# Patient Record
Sex: FEMALE | Race: BLACK OR AFRICAN AMERICAN | NOT HISPANIC OR LATINO | Employment: FULL TIME | URBAN - METROPOLITAN AREA
[De-identification: names, ages, dates, MRNs, and addresses within clinical notes are randomized per-mention and may not be internally consistent; named-entity substitution may affect disease eponyms.]

---

## 2017-03-29 ENCOUNTER — LAB VISIT (OUTPATIENT)
Dept: LAB | Facility: HOSPITAL | Age: 32
End: 2017-03-29
Attending: REGISTERED NURSE
Payer: COMMERCIAL

## 2017-03-29 ENCOUNTER — OFFICE VISIT (OUTPATIENT)
Dept: FAMILY MEDICINE | Facility: CLINIC | Age: 32
End: 2017-03-29
Payer: COMMERCIAL

## 2017-03-29 VITALS
HEIGHT: 66 IN | HEART RATE: 76 BPM | BODY MASS INDEX: 47.09 KG/M2 | WEIGHT: 293 LBS | DIASTOLIC BLOOD PRESSURE: 72 MMHG | TEMPERATURE: 99 F | RESPIRATION RATE: 18 BRPM | OXYGEN SATURATION: 99 % | SYSTOLIC BLOOD PRESSURE: 128 MMHG

## 2017-03-29 DIAGNOSIS — E88.819 INSULIN RESISTANCE: ICD-10-CM

## 2017-03-29 DIAGNOSIS — E55.9 VITAMIN D DEFICIENCY DISEASE: ICD-10-CM

## 2017-03-29 DIAGNOSIS — E66.01 MORBID OBESITY WITH BMI OF 45.0-49.9, ADULT: ICD-10-CM

## 2017-03-29 DIAGNOSIS — J30.9 ALLERGIC RHINITIS, CAUSE UNSPECIFIED: ICD-10-CM

## 2017-03-29 DIAGNOSIS — Z00.00 ANNUAL PHYSICAL EXAM: Primary | ICD-10-CM

## 2017-03-29 DIAGNOSIS — Z00.00 ANNUAL PHYSICAL EXAM: ICD-10-CM

## 2017-03-29 LAB
BASOPHILS # BLD AUTO: 0.03 K/UL
BASOPHILS NFR BLD: 0.5 %
DIFFERENTIAL METHOD: ABNORMAL
EOSINOPHIL # BLD AUTO: 0.1 K/UL
EOSINOPHIL NFR BLD: 1.6 %
ERYTHROCYTE [DISTWIDTH] IN BLOOD BY AUTOMATED COUNT: 15.6 %
HCT VFR BLD AUTO: 38.1 %
HGB BLD-MCNC: 12.3 G/DL
LYMPHOCYTES # BLD AUTO: 2.2 K/UL
LYMPHOCYTES NFR BLD: 34.7 %
MCH RBC QN AUTO: 26.2 PG
MCHC RBC AUTO-ENTMCNC: 32.3 %
MCV RBC AUTO: 81 FL
MONOCYTES # BLD AUTO: 0.6 K/UL
MONOCYTES NFR BLD: 8.7 %
NEUTROPHILS # BLD AUTO: 3.4 K/UL
NEUTROPHILS NFR BLD: 54.3 %
PLATELET # BLD AUTO: 256 K/UL
PMV BLD AUTO: 11.4 FL
RBC # BLD AUTO: 4.7 M/UL
WBC # BLD AUTO: 6.29 K/UL

## 2017-03-29 PROCEDURE — 80053 COMPREHEN METABOLIC PANEL: CPT

## 2017-03-29 PROCEDURE — 36415 COLL VENOUS BLD VENIPUNCTURE: CPT | Mod: PO

## 2017-03-29 PROCEDURE — 83036 HEMOGLOBIN GLYCOSYLATED A1C: CPT

## 2017-03-29 PROCEDURE — 82306 VITAMIN D 25 HYDROXY: CPT

## 2017-03-29 PROCEDURE — 85025 COMPLETE CBC W/AUTO DIFF WBC: CPT

## 2017-03-29 PROCEDURE — 99999 PR PBB SHADOW E&M-EST. PATIENT-LVL III: CPT | Mod: PBBFAC,,, | Performed by: REGISTERED NURSE

## 2017-03-29 PROCEDURE — 80061 LIPID PANEL: CPT

## 2017-03-29 PROCEDURE — 83525 ASSAY OF INSULIN: CPT

## 2017-03-29 PROCEDURE — 99395 PREV VISIT EST AGE 18-39: CPT | Mod: S$GLB,,, | Performed by: REGISTERED NURSE

## 2017-03-29 PROCEDURE — 84443 ASSAY THYROID STIM HORMONE: CPT

## 2017-03-29 NOTE — PROGRESS NOTES
"Subjective:       Patient ID: Marco Antonio Cisse is a 31 y.o. female.    Chief Complaint: Annual Exam    HPI     Marco Antonio is here today for her annual wellness exam.  I have reviewed the patient's medical history in detail and updated the computerized patient record.    Diet --- Reports increased chicken, red meat, salad, fruit and pasta.  Has cut back on soda and rice.  Drinks 2 bottles of water daily.  Eats 3 meals daily.  Exercise --- Walks 3 times per week x 30 min.  Weight --- Stable.    Gyn --- followed by Dr. Lau, wellness exam completed yesterday with Pap smear.    Vitamin-D def, on no supplementation currently.      Review of Systems   Constitutional: Positive for activity change (exercising more). Negative for appetite change, chills, diaphoresis, fatigue, fever and unexpected weight change.   HENT: Negative.    Eyes: Negative.    Respiratory: Negative.    Cardiovascular: Negative for chest pain, palpitations and leg swelling.   Gastrointestinal: Negative.    Endocrine: Negative for cold intolerance, heat intolerance, polydipsia, polyphagia and polyuria.   Genitourinary: Negative.    Musculoskeletal: Negative.    Skin: Positive for wound (insect bite corner of RT eye).   Neurological: Negative.    Hematological: Negative for adenopathy. Does not bruise/bleed easily.   Psychiatric/Behavioral: Negative.        Objective:         Vitals:    03/29/17 1132   BP: 128/72   BP Location: Left arm   Patient Position: Sitting   BP Method: Manual   Pulse: 76   Resp: 18   Temp: 98.8 °F (37.1 °C)   TempSrc: Tympanic   SpO2: 99%   Weight: 133.6 kg (294 lb 8.6 oz)   Height: 5' 5.5" (1.664 m)       Physical Exam   Constitutional: She is oriented to person, place, and time. She appears well-developed and well-nourished. No distress.   HENT:   Head: Normocephalic and atraumatic.   Right Ear: External ear normal.   Left Ear: External ear normal.   Nose: Nose normal.   Mouth/Throat: Oropharynx is clear and " moist.   Eyes: Conjunctivae and EOM are normal. Pupils are equal, round, and reactive to light. Right eye exhibits no discharge. Left eye exhibits no discharge. No scleral icterus.       Insect bite noted, non-infected.   Neck: Normal range of motion. Neck supple. No JVD present. No tracheal deviation present. No thyromegaly present.   Cardiovascular: Normal rate, regular rhythm, normal heart sounds and intact distal pulses.  Exam reveals no gallop and no friction rub.    No murmur heard.  Pulmonary/Chest: Effort normal and breath sounds normal. No stridor. No respiratory distress. She has no wheezes. She exhibits no tenderness.   Abdominal: Soft. Bowel sounds are normal.   Musculoskeletal: Normal range of motion. She exhibits no edema, tenderness or deformity.   Lymphadenopathy:     She has no cervical adenopathy.   Neurological: She is alert and oriented to person, place, and time. She exhibits normal muscle tone. Coordination normal.   Skin: Skin is warm and dry. She is not diaphoretic.   Psychiatric: She has a normal mood and affect. Her behavior is normal. Judgment and thought content normal.   Vitals reviewed.      Assessment:       1. Annual physical exam    2. Insulin resistance    3. Allergic rhinitis, cause unspecified    4. Vitamin D deficiency disease    5. Morbid obesity with BMI of 45.0-49.9, adult        Plan:       Marco Antonio was seen today for annual exam.    Diagnoses and all orders for this visit:    Annual physical exam  -     CBC auto differential; Future  -     Comprehensive metabolic panel; Future  -     TSH; Future  -     Vitamin D; Future  -     Lipid panel; Future  -     Hemoglobin A1c; Future  -     Insulin, random; Future    Insulin resistance  -     Hemoglobin A1c; Future  -     Insulin, random; Future    Allergic rhinitis, cause unspecified        -    Stable, on medication as needed.    Vitamin D deficiency disease  -     Vitamin D; Future    Morbid obesity with BMI of 45.0-49.9,  adult  -     CBC auto differential; Future  -     Comprehensive metabolic panel; Future  -     TSH; Future  -     Lipid panel; Future      Healthy diet, routine exercise, weight loss.  Lab pending.  Follow-up in clinic as needed.

## 2017-03-29 NOTE — MR AVS SNAPSHOT
[facility]  [address]  [place] [address]  Phone: [phone]                  Marco Antonio Cisse   3/29/2017 11:30 AM   Office Visit    Description:  Female : 1985   Provider:  Randal Jain NP   Department:  [facility]           Reason for Visit     Annual Exam           Diagnoses this Visit        Comments    Annual physical exam    -  Primary     Insulin resistance         Allergic rhinitis, cause unspecified         Vitamin D deficiency disease         Morbid obesity with BMI of 45.0-49.9, adult                To Do List           Future Appointments        Provider Department Dept Phone    3/29/2017 12:10 PM LABORATORY, [address] [facility] [phone]      Goals (5 Years of Data)     None      [facility]     [facility] Care [facility] -  Assistance  Registered nurses in the [facility] provide clinical advisement, health education, appointment booking, and other advisory services.  Call for this free service at [phone].             Medications           Message regarding Medications     Verify the changes and/or additions to your medication regime listed below are the same as discussed with your clinician today.  If any of these changes or additions are incorrect, please notify your healthcare provider.        STOP taking these medications     acrivastine-pseudoephedrine 8-60 mg Cap Take 1 tablet by mouth every 4 to 6 hours as needed.    fluticasone (FLONASE) 50 mcg/actuation nasal spray 2 sprays by Each Nare route once daily.    nystatin (MYCOSTATIN) cream Apply topically 2 (two) times daily.           Verify that the below list of medications is an accurate representation of the medications you are currently taking.  If none reported, the list may be blank. If incorrect, please contact your healthcare provider. Carry this list with you in case of emergency.          "  Current Medications     albuterol 90 mcg/actuation inhaler Inhale 2 puffs into the lungs every 4 to 6 hours as needed for Wheezing.    azelastine (ASTELIN) 137 mcg nasal spray 1 spray (137 mcg total) by Nasal route 2 (two) times daily.    diclofenac potassium 50 mg PwPk Take 50 mg by mouth once as needed.           Clinical Reference Information           Your Vitals Were     BP Pulse Temp Resp    128/72 (BP Location: Left arm, Patient Position: Sitting, BP Method: Manual) 76 98.8 °F (37.1 °C) (Tympanic) 18    Height Weight SpO2 BMI    5' 5.5" (1.664 m) 133.6 kg (294 lb 8.6 oz) 99% 48.27 kg/m2      Blood Pressure          Most Recent Value    BP  128/72      Allergies as of 3/29/2017     Lortab [Hydrocodone-acetaminophen]      Immunizations Administered on Date of Encounter - 3/29/2017     None      Orders Placed During Today's Visit     Future Labs/Procedures Expected by Expires    CBC auto differential  3/29/2017 5/28/2018    Comprehensive metabolic panel  3/29/2017 5/28/2018    Hemoglobin A1c  3/29/2017 5/28/2018    Insulin, random  3/29/2017 5/28/2018    Lipid panel  3/29/2017 5/28/2018    TSH  3/29/2017 5/28/2018    Vitamin D  3/29/2017 5/28/2018      Language Assistance Services     ATTENTION: Language assistance services are available, free of charge. Please call [phone].      ATENCIÓN: Si habla español, tiene a barnard disposición servicios gratuitos de asistencia lingüística. Llame al [phone].     CHÚ Ý: N?u b?n nói Ti?ng Vi?t, có các d?ch v? h? tr? ngôn ng? mi?n phí dành cho b?n. G?i s? [phone].         [facility] complies with applicable Federal civil rights laws and does not discriminate on the basis of race, color, national origin, age, disability, or sex.        "

## 2017-03-30 LAB
25(OH)D3+25(OH)D2 SERPL-MCNC: 16 NG/ML
ALBUMIN SERPL BCP-MCNC: 3.5 G/DL
ALP SERPL-CCNC: 65 U/L
ALT SERPL W/O P-5'-P-CCNC: 15 U/L
ANION GAP SERPL CALC-SCNC: 10 MMOL/L
AST SERPL-CCNC: 18 U/L
BILIRUB SERPL-MCNC: 0.5 MG/DL
BUN SERPL-MCNC: 8 MG/DL
CALCIUM SERPL-MCNC: 9 MG/DL
CHLORIDE SERPL-SCNC: 105 MMOL/L
CHOLEST/HDLC SERPL: 3.6 {RATIO}
CO2 SERPL-SCNC: 22 MMOL/L
CREAT SERPL-MCNC: 0.7 MG/DL
EST. GFR  (AFRICAN AMERICAN): >60 ML/MIN/1.73 M^2
EST. GFR  (NON AFRICAN AMERICAN): >60 ML/MIN/1.73 M^2
ESTIMATED AVG GLUCOSE: 117 MG/DL
GLUCOSE SERPL-MCNC: 77 MG/DL
HBA1C MFR BLD HPLC: 5.7 %
HDL/CHOLESTEROL RATIO: 27.6 %
HDLC SERPL-MCNC: 196 MG/DL
HDLC SERPL-MCNC: 54 MG/DL
INSULIN COLLECTION INTERVAL: NORMAL
INSULIN SERPL-ACNC: 5.2 UU/ML
LDLC SERPL CALC-MCNC: 131.8 MG/DL
NONHDLC SERPL-MCNC: 142 MG/DL
POTASSIUM SERPL-SCNC: 4.1 MMOL/L
PROT SERPL-MCNC: 7.6 G/DL
SODIUM SERPL-SCNC: 137 MMOL/L
TRIGL SERPL-MCNC: 51 MG/DL
TSH SERPL DL<=0.005 MIU/L-ACNC: 0.71 UIU/ML

## 2017-04-03 ENCOUNTER — TELEPHONE (OUTPATIENT)
Dept: FAMILY MEDICINE | Facility: CLINIC | Age: 32
End: 2017-04-03

## 2017-04-03 DIAGNOSIS — E55.9 VITAMIN D DEFICIENCY DISEASE: Primary | ICD-10-CM

## 2017-04-03 RX ORDER — ASPIRIN 325 MG
50000 TABLET, DELAYED RELEASE (ENTERIC COATED) ORAL WEEKLY
Qty: 4 CAPSULE | Refills: 6 | Status: SHIPPED | OUTPATIENT
Start: 2017-04-03 | End: 2017-08-10

## 2017-04-03 NOTE — TELEPHONE ENCOUNTER
----- Message from Roselia Moura sent at 4/3/2017  2:44 PM CDT -----  Contact: pt  Pt states she would like to speak to the nurse regarding her test results.  Please calls the pt @ [phone].

## 2017-05-15 ENCOUNTER — OFFICE VISIT (OUTPATIENT)
Dept: FAMILY MEDICINE | Facility: CLINIC | Age: 32
End: 2017-05-15
Payer: COMMERCIAL

## 2017-05-15 VITALS
RESPIRATION RATE: 18 BRPM | HEIGHT: 67 IN | SYSTOLIC BLOOD PRESSURE: 128 MMHG | BODY MASS INDEX: 45.99 KG/M2 | WEIGHT: 293 LBS | HEART RATE: 80 BPM | DIASTOLIC BLOOD PRESSURE: 80 MMHG | OXYGEN SATURATION: 100 % | TEMPERATURE: 98 F

## 2017-05-15 DIAGNOSIS — L02.429 BOIL, AXILLA: Primary | ICD-10-CM

## 2017-05-15 PROCEDURE — 99213 OFFICE O/P EST LOW 20 MIN: CPT | Mod: S$GLB,,, | Performed by: REGISTERED NURSE

## 2017-05-15 PROCEDURE — 99999 PR PBB SHADOW E&M-EST. PATIENT-LVL III: CPT | Mod: PBBFAC,,, | Performed by: REGISTERED NURSE

## 2017-05-15 PROCEDURE — 1160F RVW MEDS BY RX/DR IN RCRD: CPT | Mod: S$GLB,,, | Performed by: REGISTERED NURSE

## 2017-05-15 RX ORDER — SULFAMETHOXAZOLE AND TRIMETHOPRIM 800; 160 MG/1; MG/1
1 TABLET ORAL 2 TIMES DAILY
Qty: 20 TABLET | Refills: 0 | Status: SHIPPED | OUTPATIENT
Start: 2017-05-15 | End: 2017-05-25

## 2017-05-15 RX ORDER — MUPIROCIN 20 MG/G
OINTMENT TOPICAL 3 TIMES DAILY
Qty: 30 G | Refills: 0 | Status: SHIPPED | OUTPATIENT
Start: 2017-05-15 | End: 2017-05-25

## 2017-05-15 NOTE — MR AVS SNAPSHOT
[facility]  [address] [address]  Phone: [phone]                  Marco Antonio Cisse   5/15/2017 10:45 AM   Office Visit    Description:  Female : 1985   Provider:  Randal Jain NP   Department:  [facility]           Reason for Visit     Recurrent Skin Infections           Diagnoses this Visit        Comments    Boil, axilla    -  Primary            To Do List           Goals (5 Years of Data)     None       These Medications        Disp Refills Start End    sulfamethoxazole-trimethoprim 800-160mg (BACTRIM DS) 800-160 mg Tab 20 tablet 0 5/15/2017 2017    Take 1 tablet by mouth 2 (two) times daily. - Oral    Pharmacy: [facility] [address] [place] [address] [address] AT [facility] [address] Ph #: [phone]       mupirocin (BACTROBAN) 2 % ointment 30 g 0 5/15/2017 2017    Apply topically 3 (three) times daily. - Topical (Top)    Pharmacy: [facility] [address] [place] [address] [address] AT [facility] [address] Ph #: [phone]         [facility]     [facility] Care [facility] -  Assistance  Unless otherwise directed by your provider, please contact [facility], our nurse care line that is available for  assistance.     Registered nurses in the [facility] provide: appointment scheduling, clinical advisement, health education, and other advisory services.  Call: [phone] (toll free)               Medications           Message regarding Medications     Verify the changes and/or additions to your medication regime listed below are the same as discussed with your clinician today.  If any of these changes or additions are incorrect, please notify your healthcare provider.        START taking these NEW medications        Refills    sulfamethoxazole-trimethoprim 800-160mg (BACTRIM DS) 800-160 mg Tab 0  "   Sig: Take 1 tablet by mouth 2 (two) times daily.    Class: Normal    Route: Oral    mupirocin (BACTROBAN) 2 % ointment 0    Sig: Apply topically 3 (three) times daily.    Class: Normal    Route: Topical (Top)           Verify that the below list of medications is an accurate representation of the medications you are currently taking.  If none reported, the list may be blank. If incorrect, please contact your healthcare provider. Carry this list with you in case of emergency.           Current Medications     cholecalciferol, vitamin D3, 50,000 unit capsule Take 1 capsule (50,000 Units total) by mouth once a week.    albuterol 90 mcg/actuation inhaler Inhale 2 puffs into the lungs every 4 to 6 hours as needed for Wheezing.    azelastine (ASTELIN) 137 mcg nasal spray 1 spray (137 mcg total) by Nasal route 2 (two) times daily.    diclofenac potassium 50 mg PwPk Take 50 mg by mouth once as needed.    mupirocin (BACTROBAN) 2 % ointment Apply topically 3 (three) times daily.    sulfamethoxazole-trimethoprim 800-160mg (BACTRIM DS) 800-160 mg Tab Take 1 tablet by mouth 2 (two) times daily.           Clinical Reference Information           Your Vitals Were     BP Pulse Temp Resp Height Weight    128/80 80 97.9 °F (36.6 °C) (Tympanic) 18 5' 7" (1.702 m) 133.4 kg (294 lb 1.5 oz)    SpO2 BMI             100% 46.06 kg/m2         Blood Pressure          Most Recent Value    BP  128/80      Allergies as of 5/15/2017     Lortab [Hydrocodone-acetaminophen]      Immunizations Administered on Date of Encounter - 5/15/2017     None      Instructions      Abscess (Antibiotic Treatment Only)  An abscess (sometimes called a boil) happens when bacteria get trapped under the skin and start to grow. Pus forms inside the abscess as the body responds to the bacteria. An abscess can happen with an insect bite, ingrown hair, blocked oil gland, pimple, cyst, or puncture wound.  In the early stages, your wound may be red and tender. For this " stage, you may get antibiotics. If the abscess does not get better with antibiotics, it will need to be drained with a small cut.  Home care  These tips will help you care for your abscess at home:  · Soak the wound in hot water or apply hot packs (small towel soaked in hot water) to the area for 20 minutes at a time. Do this 3 to 4 times a day.  · Do not cut, squeeze, or pop the boil yourself.  · Apply antibiotic cream or ointment to the skin 3 to 4 times a day, unless something else was prescribed. Some ointments include an antibiotic plus a pain reliever.  · If your doctor prescribed antibiotics, do not stop taking them until you have finished the medicine or the doctor tells you to stop.  · You may use an over-the-counter pain medicine to control pain, unless another pain medicine was prescribed. If you have chronic liver or kidney disease or ever had a stomach ulcer or gastrointestinal bleeding, talk with your doctor before using these any of these.  Follow-up care  Follow up with your healthcare provider, or as advised. Check your wound each day for the signs of worsening infection listed below.  When to seek medical advice  Get prompt medical attention if any of these occur:  · An increase in redness or swelling  · Red streaks in the skin leading away from the abscess  · An increase in local pain or swelling  · Fever of 100.4ºF (38ºC) or higher, or as directed by your healthcare provider  · Pus or fluid coming from the abscess  · Boil returns after getting better  Date Last Reviewed: 9/1/2016 © 2000-2016 [employer] [place]. [address] [place] [address], [place] [address]. All rights reserved. This information is not intended as a substitute for professional medical care. Always follow your healthcare professional's instructions.             Language Assistance Services     ATTENTION: Language assistance services are available, free of charge. Please call [phone].      ATENCIÓN: Camille clarke  tiene a barnard disposición servicios gratuitos de asistencia lingüística. [place] al [phone].     HUMZA Ý: N?u b?n nói Ti?ng Vi?t, có các d?ch v? h? tr? ngôn ng? mi?n phí dành cho b?n. G?i s? [phone].         [facility] complies with applicable Federal civil rights laws and does not discriminate on the basis of race, color, national origin, age, disability, or sex.

## 2017-05-15 NOTE — PROGRESS NOTES
Subjective:       Patient ID: Marco Antonio Cisse is a 31 y.o. female.    Chief Complaint: Boil right axillary area      HPI    Marco Antonio is here today with reports of boil to RT axilla x 4 days.  Using warm compresses to area and has not been as hard as when first appeared.  Denies fever, chills or drainage.      Review of Systems    See HPI      Patient Active Problem List   Diagnosis    Migraine headache    Hypermenorrhea    Vitamin D deficiency disease    Insulin resistance    Irritable bowel syndrome (IBS)         Objective:     Physical Exam   Constitutional: She is oriented to person, place, and time. She appears well-developed and well-nourished.   Neurological: She is alert and oriented to person, place, and time.   Skin: Lesion (Boil to RT axillary area, no induration///soft, no drainage) noted.         Medication List with Changes/Refills   New Medications    MUPIROCIN (BACTROBAN) 2 % OINTMENT    Apply topically 3 (three) times daily.    SULFAMETHOXAZOLE-TRIMETHOPRIM 800-160MG (BACTRIM DS) 800-160 MG TAB    Take 1 tablet by mouth 2 (two) times daily.   Current Medications    ALBUTEROL 90 MCG/ACTUATION INHALER    Inhale 2 puffs into the lungs every 4 to 6 hours as needed for Wheezing.    AZELASTINE (ASTELIN) 137 MCG NASAL SPRAY    1 spray (137 mcg total) by Nasal route 2 (two) times daily.    CHOLECALCIFEROL, VITAMIN D3, 50,000 UNIT CAPSULE    Take 1 capsule (50,000 Units total) by mouth once a week.    DICLOFENAC POTASSIUM 50 MG PWPK    Take 50 mg by mouth once as needed.           Diagnosis       1. Boil, axilla          Assessment/ Plan     Boil, axilla  -     sulfamethoxazole-trimethoprim 800-160mg (BACTRIM DS) 800-160 mg Tab; Take 1 tablet by mouth 2 (two) times daily.  Dispense: 20 tablet; Refill: 0  -     mupirocin (BACTROBAN) 2 % ointment; Apply topically 3 (three) times daily.  Dispense: 30 g; Refill: 0      Warm compresses TID to QID as directed.  Skin/wound care discussed.  Follow-up  in clinic in 2 to 3 days if condition worsens or fails to resolve.      ADALBERTO Chavez  [facility]

## 2017-05-15 NOTE — PATIENT INSTRUCTIONS
Abscess (Antibiotic Treatment Only)  An abscess (sometimes called a boil) happens when bacteria get trapped under the skin and start to grow. Pus forms inside the abscess as the body responds to the bacteria. An abscess can happen with an insect bite, ingrown hair, blocked oil gland, pimple, cyst, or puncture wound.  In the early stages, your wound may be red and tender. For this stage, you may get antibiotics. If the abscess does not get better with antibiotics, it will need to be drained with a small cut.  Home care  These tips will help you care for your abscess at home:  · Soak the wound in hot water or apply hot packs (small towel soaked in hot water) to the area for 20 minutes at a time. Do this 3 to 4 times a day.  · Do not cut, squeeze, or pop the boil yourself.  · Apply antibiotic cream or ointment to the skin 3 to 4 times a day, unless something else was prescribed. Some ointments include an antibiotic plus a pain reliever.  · If your doctor prescribed antibiotics, do not stop taking them until you have finished the medicine or the doctor tells you to stop.  · You may use an over-the-counter pain medicine to control pain, unless another pain medicine was prescribed. If you have chronic liver or kidney disease or ever had a stomach ulcer or gastrointestinal bleeding, talk with your doctor before using these any of these.  Follow-up care  Follow up with your healthcare provider, or as advised. Check your wound each day for the signs of worsening infection listed below.  When to seek medical advice  Get prompt medical attention if any of these occur:  · An increase in redness or swelling  · Red streaks in the skin leading away from the abscess  · An increase in local pain or swelling  · Fever of 100.4ºF (38ºC) or higher, or as directed by your healthcare provider  · Pus or fluid coming from the abscess  · Boil returns after getting better  Date Last Reviewed: 9/1/2016  © 2576-2496 [employer] [address] [place] [address]. All rights reserved. This information is not intended as a substitute for professional medical care. Always follow your healthcare professional's instructions.

## 2017-06-04 ENCOUNTER — OFFICE VISIT (OUTPATIENT)
Dept: URGENT CARE | Facility: CLINIC | Age: 32
End: 2017-06-04
Payer: COMMERCIAL

## 2017-06-04 VITALS
HEART RATE: 86 BPM | TEMPERATURE: 99 F | DIASTOLIC BLOOD PRESSURE: 90 MMHG | WEIGHT: 293 LBS | OXYGEN SATURATION: 98 % | SYSTOLIC BLOOD PRESSURE: 130 MMHG | HEIGHT: 67 IN | BODY MASS INDEX: 45.99 KG/M2

## 2017-06-04 DIAGNOSIS — J06.9 ACUTE URI: Primary | ICD-10-CM

## 2017-06-04 PROCEDURE — 99999 PR PBB SHADOW E&M-EST. PATIENT-LVL III: CPT | Mod: PBBFAC,,, | Performed by: NURSE PRACTITIONER

## 2017-06-04 PROCEDURE — 96372 THER/PROPH/DIAG INJ SC/IM: CPT | Mod: S$GLB,,, | Performed by: NURSE PRACTITIONER

## 2017-06-04 PROCEDURE — 99214 OFFICE O/P EST MOD 30 MIN: CPT | Mod: 25,S$GLB,, | Performed by: NURSE PRACTITIONER

## 2017-06-04 RX ORDER — IPRATROPIUM BROMIDE 42 UG/1
2 SPRAY, METERED NASAL 4 TIMES DAILY
Qty: 15 ML | Refills: 0 | Status: SHIPPED | OUTPATIENT
Start: 2017-06-04 | End: 2017-08-10

## 2017-06-04 RX ORDER — CETIRIZINE HYDROCHLORIDE, PSEUDOEPHEDRINE HYDROCHLORIDE 5; 120 MG/1; MG/1
1 TABLET, FILM COATED, EXTENDED RELEASE ORAL EVERY 12 HOURS
Qty: 24 TABLET | Refills: 0 | COMMUNITY
Start: 2017-06-04 | End: 2017-06-14

## 2017-06-04 RX ORDER — BETAMETHASONE SODIUM PHOSPHATE AND BETAMETHASONE ACETATE 3; 3 MG/ML; MG/ML
9 INJECTION, SUSPENSION INTRA-ARTICULAR; INTRALESIONAL; INTRAMUSCULAR; SOFT TISSUE
Status: COMPLETED | OUTPATIENT
Start: 2017-06-04 | End: 2017-06-04

## 2017-06-04 RX ORDER — FLUTICASONE PROPIONATE 50 MCG
2 SPRAY, SUSPENSION (ML) NASAL DAILY
Qty: 16 G | Refills: 2 | Status: SHIPPED | OUTPATIENT
Start: 2017-06-04 | End: 2021-03-22 | Stop reason: SDUPTHER

## 2017-06-04 RX ADMIN — BETAMETHASONE SODIUM PHOSPHATE AND BETAMETHASONE ACETATE 9 MG: 3; 3 INJECTION, SUSPENSION INTRA-ARTICULAR; INTRALESIONAL; INTRAMUSCULAR; SOFT TISSUE at 10:06

## 2017-06-04 NOTE — PROGRESS NOTES
Administered betamethasone acetate-betamethasone sodium phosphate injection 9 mg IM injection into patient's right ventrogluteal. Patient tolerated well. Patient was instructed to wait 20 minutes in lobby and if any adverse reactions occur to let the  know so we can address it. Patient stated understanding.

## 2017-06-04 NOTE — PATIENT INSTRUCTIONS
Viral Upper Respiratory Illness (Adult)  You have a viral upper respiratory illness (URI), which is another term for the common cold. This illness is contagious during the first few days. It is spread through the air by coughing and sneezing. It may also be spread by direct contact (touching the sick person and then touching your own eyes, nose, or mouth). Frequent handwashing will decrease risk of spread. Most viral illnesses go away within 7 to 10 days with rest and simple home remedies. Sometimes the illness may last for several weeks. Antibiotics will not kill a virus, and they are generally not prescribed for this condition.    Home care  · If symptoms are severe, rest at home for the first 2 to 3 days. When you resume activity, don't let yourself get too tired.  · Avoid being exposed to cigarette smoke (yours or others).  · You may use acetaminophen or ibuprofen to control pain and fever, unless another medicine was prescribed. (Note: If you have chronic liver or kidney disease, have ever had a stomach ulcer or gastrointestinal bleeding, or are taking blood-thinning medicines, talk with your healthcare provider before using these medicines.) Aspirin should never be given to anyone under 18 years of age who is ill with a viral infection or fever. It may cause severe liver or brain damage.  · Your appetite may be poor, so a light diet is fine. Avoid dehydration by drinking 6 to 8 glasses of fluids per day (water, soft drinks, juices, tea, or soup). Extra fluids will help loosen secretions in the nose and lungs.  · Over-the-counter cold medicines will not shorten the length of time youre sick, but they may be helpful for the following symptoms: cough, sore throat, and nasal and sinus congestion. (Note: Do not use decongestants if you have high blood pressure.)  Follow-up care  Follow up with your healthcare provider, or as advised.  When to seek medical advice  Call your healthcare provider right away if any  of these occur:  · Cough with lots of colored sputum (mucus)  · Severe headache; face, neck, or ear pain  · Difficulty swallowing due to throat pain  · Fever of 100.4°F (38°C)  Call 911, or get immediate medical care  Call emergency services right away if any of these occur:  · Chest pain, shortness of breath, wheezing, or difficulty breathing  · Coughing up blood  · Inability to swallow due to throat pain  Date Last Reviewed: 9/13/2015  © 1123-6241 [employer]. [address] [place] [address]. All rights reserved. This information is not intended as a substitute for professional medical care. Always follow your healthcare professional's instructions.

## 2017-06-04 NOTE — PROGRESS NOTES
Subjective:       Patient ID: Marco Antonio Cisse is a 31 y.o. female.    Chief Complaint: Nasal Congestion    Patient presents with: Nasal Congestion with acute uri       Subjective:  Marco Antonio Cisse is a 31 y.o. female who presents to [facility] for evaluation of symptoms of a URI.   Symptoms include mild aches, congestion, nasal congestion, post nasal drip, sneezing and rhinorrhea.   Onset of symptoms was 2 days ago in evening with sneezing and rhinorrhea, then yesterday slight worsening with feeling aches, nasal congestion, sneezing, clear nasal drainage, mild sore throat with post nasal drip.   Today she has not improved and presents to urgent care requesting cortisone injection.    No fever since onset.   Treatment to date: over the counter 4 hr cold med, one dose yesterday.           Review of Systems   Constitutional: Positive for activity change. Negative for chills, diaphoresis, fatigue and fever.   HENT: Positive for congestion, postnasal drip, rhinorrhea, sinus pressure, sneezing, sore throat (mild) and voice change. Negative for ear discharge, ear pain, facial swelling and nosebleeds.    Eyes: Negative.  Negative for visual disturbance.   Respiratory: Negative for cough, shortness of breath and wheezing.    Cardiovascular: Negative for chest pain, palpitations and leg swelling.   Gastrointestinal: Negative for abdominal distention, abdominal pain, blood in stool, constipation, diarrhea, nausea and vomiting.   Endocrine: Negative for cold intolerance and heat intolerance.   Genitourinary: Negative for decreased urine volume, difficulty urinating, dysuria, frequency, hematuria and urgency.   Musculoskeletal: Positive for myalgias (2 days ago). Negative for arthralgias, back pain, neck pain and neck stiffness.   Skin: Negative.    Allergic/Immunologic: Negative for environmental allergies and immunocompromised state.   Neurological: Positive for headaches (mild frontal not  "sustained). Negative for dizziness, syncope, speech difficulty and light-headedness.   Psychiatric/Behavioral: Negative for agitation, confusion and hallucinations. The patient is not nervous/anxious.        Objective:      Physical Exam   Constitutional: She is oriented to person, place, and time. Vital signs are normal. She appears well-developed and well-nourished. She is active and cooperative.  Non-toxic appearance. She does not appear ill. No distress.   HENT:   Head: Normocephalic.   Right Ear: Tympanic membrane, external ear and ear canal normal.   Left Ear: Tympanic membrane, external ear and ear canal normal.   Nose: Mucosal edema (mild) and rhinorrhea (clear) present. Right sinus exhibits no maxillary sinus tenderness and no frontal sinus tenderness. Left sinus exhibits no maxillary sinus tenderness and no frontal sinus tenderness.   Mouth/Throat: Uvula is midline, oropharynx is clear and moist and mucous membranes are normal. No oropharyngeal exudate. Tonsils are 0 on the right. Tonsils are 0 on the left. No tonsillar exudate.   Eyes: Conjunctivae are normal.   Neck: Normal range of motion. Neck supple.   Cardiovascular: Normal rate, regular rhythm, normal heart sounds and intact distal pulses.    Pulmonary/Chest: Effort normal and breath sounds normal.   Abdominal: Soft. Bowel sounds are normal.   Musculoskeletal: Normal range of motion.   Neurological: She is alert and oriented to person, place, and time.   Skin: Skin is warm and dry.   Psychiatric: She has a normal mood and affect. Her behavior is normal. Judgment and thought content normal.   Vitals reviewed.      Vitals:    06/04/17 0931   BP: (!) 130/90   Pulse: 86   Temp: 98.7 °F (37.1 °C)   SpO2: 98%   Weight: 135.6 kg (298 lb 15.1 oz)   Height: 5' 7" (1.702 m)     Body mass index is 46.82 kg/m².    Assessment:       1. Acute URI        Plan:       Marco Antonio was seen today for nasal congestion.    Diagnoses and all orders for this visit:    Acute " URI  -     cetirizine-pseudoephedrine 5-120 mg Tb12; Take 1 tablet by mouth every 12 (twelve) hours.  -     fluticasone (FLONASE) 50 mcg/actuation nasal spray; 2 sprays by Each Nare route once daily.  -     betamethasone acetate-betamethasone sodium phosphate injection 9 mg; Inject 1.5 mLs (9 mg total) into the muscle one time.  -     ipratropium (ATROVENT) 0.06 % nasal spray; 2 sprays by Nasal route 4 (four) times daily.              Patient strongly request cortisone injection for her uri symptoms, as she has taken in past with good results.   Risk and benefits of steroid therapy were reviewed in detail and include, but not limited to, elevated blood sugars, joint avascular necrosis,   necrosis of tissue or lipodystrophy or infection of the injection site, hallucinations, depression or worsening depression, insomnia, sweating,   hyperactivity, tremors, suppression of one's own cortisone production, delayed wound healing and GI bleeding.   The patient would like to proceed with steroid treatment (injection/medication) knowing and verbally accepting the risks.      Discussed diagnosis and treatment of URI.  Discussed the importance of avoiding unnecessary antibiotic therapy. Provided GET SMART, know when antibiotics work hand out.   Suggested symptomatic OTC remedies.  Nasal saline spray for congestion.  Nasal steroids per orders.  Rest  Drink plenty of clear fluids--at least 64 ounces of water/juice  Normal saline nasal wash to irrigate sinuses and for congestion/runny nose  Practice good handwashing  Zyrtec or Claritin to help dry mucus and post nasal drip  Mucinex or Mucinex DM for cough and chest congestion  Tylenol or Ibuprofen for fever, headache and body aches  Warm salt water gargles for throat comfort  Chloraseptic spray or lozenges for throat comfort   After visit summary given and discussed. Patient verbalized understanding and agrees with treatment plan.   Follow up as needed.  Follow up with PCP in 10  days if not improving gradually, follow up sooner for worsening or new onset of symptoms.

## 2017-07-24 ENCOUNTER — TELEPHONE (OUTPATIENT)
Dept: FAMILY MEDICINE | Facility: CLINIC | Age: 32
End: 2017-07-24

## 2017-07-24 RX ORDER — BUTALBITAL, ACETAMINOPHEN AND CAFFEINE 50; 325; 40 MG/1; MG/1; MG/1
1 TABLET ORAL
Qty: 60 TABLET | Refills: 0 | Status: SHIPPED | OUTPATIENT
Start: 2017-07-24 | End: 2017-08-23

## 2017-07-24 NOTE — TELEPHONE ENCOUNTER
----- Message from Saloni Coronado sent at 7/24/2017  9:49 AM CDT -----  Would liek to speak to nurse about headache medication. please call back at [phone]. thanks

## 2017-07-24 NOTE — TELEPHONE ENCOUNTER
Pt would like something called in for migraines. She said you have previously prescribed something before.

## 2017-07-25 ENCOUNTER — OFFICE VISIT (OUTPATIENT)
Dept: FAMILY MEDICINE | Facility: CLINIC | Age: 32
End: 2017-07-25
Payer: COMMERCIAL

## 2017-07-25 VITALS
HEART RATE: 78 BPM | RESPIRATION RATE: 18 BRPM | OXYGEN SATURATION: 100 % | HEIGHT: 67 IN | DIASTOLIC BLOOD PRESSURE: 88 MMHG | WEIGHT: 293 LBS | BODY MASS INDEX: 45.99 KG/M2 | TEMPERATURE: 97 F | SYSTOLIC BLOOD PRESSURE: 122 MMHG

## 2017-07-25 DIAGNOSIS — G44.201 INTRACTABLE TENSION-TYPE HEADACHE, UNSPECIFIED CHRONICITY PATTERN: Primary | ICD-10-CM

## 2017-07-25 PROCEDURE — 99999 PR PBB SHADOW E&M-EST. PATIENT-LVL III: CPT | Mod: PBBFAC,,, | Performed by: REGISTERED NURSE

## 2017-07-25 PROCEDURE — 96372 THER/PROPH/DIAG INJ SC/IM: CPT | Mod: S$GLB,,, | Performed by: REGISTERED NURSE

## 2017-07-25 PROCEDURE — 99213 OFFICE O/P EST LOW 20 MIN: CPT | Mod: 25,S$GLB,, | Performed by: REGISTERED NURSE

## 2017-07-25 RX ORDER — KETOROLAC TROMETHAMINE 30 MG/ML
60 INJECTION, SOLUTION INTRAMUSCULAR; INTRAVENOUS
Status: COMPLETED | OUTPATIENT
Start: 2017-07-25 | End: 2017-07-25

## 2017-07-25 RX ORDER — NAPROXEN 500 MG/1
500 TABLET ORAL 2 TIMES DAILY WITH MEALS
Qty: 60 TABLET | Refills: 0 | Status: SHIPPED | OUTPATIENT
Start: 2017-07-25 | End: 2017-08-10

## 2017-07-25 RX ORDER — CYCLOBENZAPRINE HCL 10 MG
5-10 TABLET ORAL NIGHTLY PRN
Qty: 30 TABLET | Refills: 1 | Status: SHIPPED | OUTPATIENT
Start: 2017-07-25 | End: 2017-08-10

## 2017-07-25 RX ADMIN — KETOROLAC TROMETHAMINE 60 MG: 30 INJECTION, SOLUTION INTRAMUSCULAR; INTRAVENOUS at 09:07

## 2017-07-25 NOTE — PROGRESS NOTES
"Subjective:       Patient ID: Marco Antonio Cisse is a 31 y.o. female.    Chief Complaint: Headache and Neck Pain      HPI    Marco Antonio is here today with c/o neck pain with constant headaches x 4 days.  BC-Powder and Fioricet have not helped.  Denies neck injury or muscle strain.  Pain located posteriorly with radiation "all over head".  Pain described as pulsating or thumping.  Denies blurred vision, dizziness, numbness or tingling.  Not sleeping well due to pain.      Review of Systems   Constitutional: Negative.    Eyes: Negative.    Respiratory: Negative.    Cardiovascular: Negative.    Gastrointestinal: Positive for nausea. Negative for abdominal pain and vomiting.   Musculoskeletal: Positive for neck pain. Negative for gait problem, joint swelling and neck stiffness.   Neurological: Positive for headaches. Negative for weakness, light-headedness and numbness.         Patient Active Problem List   Diagnosis    Migraine headache    Hypermenorrhea    Vitamin D deficiency disease    Insulin resistance    Irritable bowel syndrome (IBS)         Objective:     Vitals:    07/25/17 0836 07/25/17 0837   BP: 122/88    Pulse: 78    Resp: 18    Temp: 96.6 °F (35.9 °C)    TempSrc: Tympanic    SpO2: 100%    Weight: 133.9 kg (295 lb 3.1 oz)    Height: 5' 7" (1.702 m)    PainSc:   8   8   PainLoc: Head Neck       Physical Exam   Constitutional: She is oriented to person, place, and time. She appears well-developed and well-nourished. No distress.   HENT:   Head: Normocephalic and atraumatic.   Eyes: Conjunctivae and EOM are normal. Pupils are equal, round, and reactive to light.   Neck: Normal range of motion. Muscular tenderness present. No spinous process tenderness present. No neck rigidity. No edema, no erythema and normal range of motion present.       Cardiovascular: Normal rate, regular rhythm and normal heart sounds.    Pulmonary/Chest: Effort normal and breath sounds normal.   Musculoskeletal: Normal range " of motion. She exhibits no edema or deformity.   Neurological: She is alert and oriented to person, place, and time. She has normal strength. She displays no atrophy and no tremor. No sensory deficit. She exhibits normal muscle tone. She displays no seizure activity. Coordination and gait normal.   Skin: She is not diaphoretic.         Medication List with Changes/Refills   New Medications    CYCLOBENZAPRINE (FLEXERIL) 10 MG TABLET    Take 0.5-1 tablets (5-10 mg total) by mouth nightly as needed.    NAPROXEN (NAPROSYN) 500 MG TABLET    Take 1 tablet (500 mg total) by mouth 2 (two) times daily with meals.   Current Medications    ALBUTEROL 90 MCG/ACTUATION INHALER    Inhale 2 puffs into the lungs every 4 to 6 hours as needed for Wheezing.    AZELASTINE (ASTELIN) 137 MCG NASAL SPRAY    1 spray (137 mcg total) by Nasal route 2 (two) times daily.    BUTALBITAL-ACETAMINOPHEN-CAFFEINE -40 MG (FIORICET, ESGIC) -40 MG PER TABLET    Take 1 tablet by mouth every 4 to 6 hours as needed for Headaches.    CHOLECALCIFEROL, VITAMIN D3, 50,000 UNIT CAPSULE    Take 1 capsule (50,000 Units total) by mouth once a week.    IPRATROPIUM (ATROVENT) 0.06 % NASAL SPRAY    2 sprays by Nasal route 4 (four) times daily.   Discontinued Medications    DICLOFENAC POTASSIUM 50 MG PWPK    Take 50 mg by mouth once as needed.           Diagnosis       1. Intractable tension-type headache, unspecified chronicity pattern          Assessment/ Plan     Intractable tension-type headache, unspecified chronicity pattern  -     ketorolac injection 60 mg; Inject 60 mg into the muscle one time.  -     cyclobenzaprine (FLEXERIL) 10 MG tablet; Take 0.5-1 tablets (5-10 mg total) by mouth nightly as needed.  Dispense: 30 tablet; Refill: 1  -     naproxen (NAPROSYN) 500 MG tablet; Take 1 tablet (500 mg total) by mouth 2 (two) times daily with meals.  Dispense: 60 tablet; Refill: 0      Meds discussed, take as directed.  Ice/heat to neck, gentle  stretching exercises.  Follow-up in clinic as needed.        ADALBERTO Chavez  [facility]

## 2017-08-04 ENCOUNTER — TELEPHONE (OUTPATIENT)
Dept: FAMILY MEDICINE | Facility: CLINIC | Age: 32
End: 2017-08-04

## 2017-08-04 RX ORDER — FLUCONAZOLE 150 MG/1
150 TABLET ORAL DAILY
Qty: 1 TABLET | Refills: 0 | Status: SHIPPED | OUTPATIENT
Start: 2017-08-04 | End: 2017-08-05

## 2017-08-04 NOTE — TELEPHONE ENCOUNTER
----- Message from Frances Michele sent at 8/4/2017  7:45 AM CDT -----  Contact: pt  Please call pt @ [phone], pt states she was on antibiotic and now pt need something called into pharmacy for yeast infection. Pt uses [facility]

## 2017-08-10 ENCOUNTER — OFFICE VISIT (OUTPATIENT)
Dept: FAMILY MEDICINE | Facility: CLINIC | Age: 32
End: 2017-08-10
Payer: COMMERCIAL

## 2017-08-10 VITALS
DIASTOLIC BLOOD PRESSURE: 82 MMHG | RESPIRATION RATE: 18 BRPM | HEART RATE: 85 BPM | SYSTOLIC BLOOD PRESSURE: 124 MMHG | HEIGHT: 67 IN | BODY MASS INDEX: 45.99 KG/M2 | WEIGHT: 293 LBS | TEMPERATURE: 98 F

## 2017-08-10 DIAGNOSIS — N76.0 ACUTE VAGINITIS: Primary | ICD-10-CM

## 2017-08-10 PROCEDURE — 99213 OFFICE O/P EST LOW 20 MIN: CPT | Mod: S$GLB,,, | Performed by: REGISTERED NURSE

## 2017-08-10 PROCEDURE — 87660 TRICHOMONAS VAGIN DIR PROBE: CPT

## 2017-08-10 PROCEDURE — 87480 CANDIDA DNA DIR PROBE: CPT

## 2017-08-10 PROCEDURE — 3008F BODY MASS INDEX DOCD: CPT | Mod: S$GLB,,, | Performed by: REGISTERED NURSE

## 2017-08-10 PROCEDURE — 99999 PR PBB SHADOW E&M-EST. PATIENT-LVL III: CPT | Mod: PBBFAC,,, | Performed by: REGISTERED NURSE

## 2017-08-10 RX ORDER — METRONIDAZOLE 500 MG/1
500 TABLET ORAL EVERY 12 HOURS
Qty: 14 TABLET | Refills: 0 | Status: SHIPPED | OUTPATIENT
Start: 2017-08-10 | End: 2017-08-17

## 2017-08-11 LAB
CANDIDA RRNA VAG QL PROBE: NEGATIVE
G VAGINALIS RRNA GENITAL QL PROBE: NEGATIVE
T VAGINALIS RRNA GENITAL QL PROBE: NEGATIVE

## 2017-08-11 NOTE — PROGRESS NOTES
"Subjective:       Patient ID: Marco Antonio Cisse is a 31 y.o. female.    Chief Complaint: Vaginal Itching      HPI    Marco Antonio is here today with c/o vaginal issues.  She was started on antibiotic (???) recently by [facility] on 8/4/2017 for infected or clogged tear duct.  On medication x 3 days which then caused her to feel inflammation and burning in the vaginal area.  Now with some "heat", itching and a "film".  Has not tried any OTC meds or creams at this time.      Review of Systems   Constitutional: Negative.    Genitourinary: Positive for vaginal discharge and vaginal pain. Negative for difficulty urinating, dysuria, frequency, genital sores, hematuria and pelvic pain.         Patient Active Problem List   Diagnosis    Migraine headache    Hypermenorrhea    Vitamin D deficiency disease    Insulin resistance    Irritable bowel syndrome (IBS)         Objective:     Physical Exam   Constitutional: She is oriented to person, place, and time. She appears well-developed and well-nourished.   Genitourinary: There is no rash, tenderness or lesion on the right labia. There is no rash, tenderness or lesion on the left labia. No erythema or tenderness in the vagina. Vaginal discharge (scant white/yellow d/c with foul odor) found.   Neurological: She is alert and oriented to person, place, and time.         Medication List with Changes/Refills   New Medications    METRONIDAZOLE (FLAGYL) 500 MG TABLET    Take 1 tablet (500 mg total) by mouth every 12 (twelve) hours.   Current Medications    ALBUTEROL 90 MCG/ACTUATION INHALER    Inhale 2 puffs into the lungs every 4 to 6 hours as needed for Wheezing.    AZELASTINE (ASTELIN) 137 MCG NASAL SPRAY    1 spray (137 mcg total) by Nasal route 2 (two) times daily.    BUTALBITAL-ACETAMINOPHEN-CAFFEINE -40 MG (FIORICET, ESGIC) -40 MG PER TABLET    Take 1 tablet by mouth every 4 to 6 hours as needed for Headaches.   Discontinued Medications    CHOLECALCIFEROL, " VITAMIN D3, 50,000 UNIT CAPSULE    Take 1 capsule (50,000 Units total) by mouth once a week.    CYCLOBENZAPRINE (FLEXERIL) 10 MG TABLET    Take 0.5-1 tablets (5-10 mg total) by mouth nightly as needed.    IPRATROPIUM (ATROVENT) 0.06 % NASAL SPRAY    2 sprays by Nasal route 4 (four) times daily.    NAPROXEN (NAPROSYN) 500 MG TABLET    Take 1 tablet (500 mg total) by mouth 2 (two) times daily with meals.           Diagnosis       1. Acute vaginitis          Assessment/ Plan     Acute vaginitis  -     metronidazole (FLAGYL) 500 MG tablet; Take 1 tablet (500 mg total) by mouth every 12 (twelve) hours.  Dispense: 14 tablet; Refill: 0  -     Vaginosis Screen by DNA Probe        Infection triggers and prevention discussed.  She will contact me back tomorrow with name of antibiotic from [facility] that did not agree with her.  Follow-up in clinic as needed.      ADALBERTO Chavez  [facility]

## 2017-08-14 ENCOUNTER — TELEPHONE (OUTPATIENT)
Dept: FAMILY MEDICINE | Facility: CLINIC | Age: 32
End: 2017-08-14

## 2017-08-14 NOTE — TELEPHONE ENCOUNTER
----- Message from Gisselle Gudino sent at 8/14/2017  8:17 AM CDT -----  Contact: pt  The pt wants to inform the doctor that  she is taking Amoxclab 500 , pt can be reached at [phone] ///[id number]

## 2017-08-14 NOTE — TELEPHONE ENCOUNTER
----- Message from Delvin Benton sent at 8/14/2017  3:22 PM CDT -----  Contact: Pt  Pt was calling to get her lab results. Please give pt a call at ..[phone] (home)

## 2017-10-10 ENCOUNTER — OFFICE VISIT (OUTPATIENT)
Dept: FAMILY MEDICINE | Facility: CLINIC | Age: 32
End: 2017-10-10
Payer: COMMERCIAL

## 2017-10-10 ENCOUNTER — HOSPITAL ENCOUNTER (OUTPATIENT)
Dept: RADIOLOGY | Facility: HOSPITAL | Age: 32
Discharge: HOME | End: 2017-10-10
Attending: REGISTERED NURSE
Payer: COMMERCIAL

## 2017-10-10 VITALS
HEART RATE: 91 BPM | HEIGHT: 67 IN | OXYGEN SATURATION: 100 % | RESPIRATION RATE: 18 BRPM | WEIGHT: 293 LBS | TEMPERATURE: 97 F | DIASTOLIC BLOOD PRESSURE: 80 MMHG | BODY MASS INDEX: 45.99 KG/M2 | SYSTOLIC BLOOD PRESSURE: 132 MMHG

## 2017-10-10 DIAGNOSIS — M54.9 UPPER BACK PAIN ON RIGHT SIDE: ICD-10-CM
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
## 2017-10-10 PROCEDURE — 72070 X-RAY EXAM THORAC SPINE 2VWS: CPT | Mod: TC,PO

## 2017-10-10 PROCEDURE — 72070 X-RAY EXAM THORAC SPINE 2VWS: CPT | Mod: 26,,, | Performed by: RADIOLOGY

## 2017-10-10 PROCEDURE — 73010 X-RAY EXAM OF SHOULDER BLADE: CPT | Mod: TC,PO,RT

## 2017-10-10 PROCEDURE — 73010 X-RAY EXAM OF SHOULDER BLADE: CPT | Mod: 26,RT,, | Performed by: RADIOLOGY

## 2017-10-10 PROCEDURE — 99214 OFFICE O/P EST MOD 30 MIN: CPT | Mod: S$GLB,,, | Performed by: REGISTERED NURSE

## 2017-10-10 PROCEDURE — 99999 PR PBB SHADOW E&M-EST. PATIENT-LVL III: CPT | Mod: PBBFAC,,, | Performed by: REGISTERED NURSE

## 2017-10-10 RX ORDER — DICLOFENAC SODIUM 75 MG/1
75 TABLET, DELAYED RELEASE ORAL 2 TIMES DAILY PRN
Qty: 60 TABLET | Refills: 2 | Status: SHIPPED | OUTPATIENT
Start: 2017-10-10 | End: 2017-10-13

## 2017-10-10 RX ORDER — PREDNISONE 50 MG/1
50 TABLET ORAL DAILY
Qty: 5 TABLET | Refills: 0 | Status: SHIPPED | OUTPATIENT
Start: 2017-10-10 | End: 2017-10-15

## 2017-10-10 RX ORDER — METHOCARBAMOL 750 MG/1
750 TABLET, FILM COATED ORAL 2 TIMES DAILY PRN
Qty: 60 TABLET | Refills: 0 | Status: SHIPPED | OUTPATIENT
Start: 2017-10-10 | End: 2018-08-30
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
## 2017-10-11 ENCOUNTER — TELEPHONE (OUTPATIENT)
Dept: FAMILY MEDICINE | Facility: CLINIC | Age: 32
End: 2017-10-11

## 2017-10-13 ENCOUNTER — TELEPHONE (OUTPATIENT)
Dept: FAMILY MEDICINE | Facility: CLINIC | Age: 32
End: 2017-10-13

## 2017-10-13 DIAGNOSIS — R07.9 CHEST PAIN, UNSPECIFIED TYPE: Primary | ICD-10-CM

## 2017-10-13 RX ORDER — INDOMETHACIN 50 MG/1
50 CAPSULE ORAL
Qty: 90 CAPSULE | Refills: 0 | Status: SHIPPED | OUTPATIENT
Start: 2017-10-13 | End: 2018-08-30

## 2017-10-13 NOTE — TELEPHONE ENCOUNTER
----- Message from Connie Cruz sent at 10/13/2017  8:59 AM CDT -----  Contact: Pt  Pt called and stated she needed to speak to the nurse. She stated the medication she was prescribed at her last visit is not working. She stated she is still having pain. She can be reached at [phone].    Thanks,  TF

## 2017-10-13 NOTE — TELEPHONE ENCOUNTER
Pt notified of change in medication,xray ordered and to complete current meds. Pt voiced understanding.

## 2017-10-13 NOTE — TELEPHONE ENCOUNTER
Stop diclofenac.  RX for Indocin sent to pharmacy.  Continue Robaxin, completed steroid pills.  CXR ordered.

## 2017-10-13 NOTE — TELEPHONE ENCOUNTER
Pt states that medication is not working and she is still having chest pain. Pain rated as 7 on scale 0-10.

## 2017-10-16 ENCOUNTER — HOSPITAL ENCOUNTER (OUTPATIENT)
Dept: RADIOLOGY | Facility: HOSPITAL | Age: 32
Discharge: HOME | End: 2017-10-16
Attending: REGISTERED NURSE
Payer: COMMERCIAL

## 2017-10-16 DIAGNOSIS — R07.9 CHEST PAIN, UNSPECIFIED TYPE: ICD-10-CM

## 2017-10-16 PROCEDURE — 71020 XR CHEST PA AND LATERAL: CPT | Mod: 26,,, | Performed by: RADIOLOGY

## 2017-10-16 PROCEDURE — 71020 XR CHEST PA AND LATERAL: CPT | Mod: TC,PO

## 2017-10-17 ENCOUNTER — TELEPHONE (OUTPATIENT)
Dept: FAMILY MEDICINE | Facility: CLINIC | Age: 32
End: 2017-10-17

## 2017-10-17 RX ORDER — AMOXICILLIN AND CLAVULANATE POTASSIUM 875; 125 MG/1; MG/1
1 TABLET, FILM COATED ORAL 2 TIMES DAILY
Qty: 20 TABLET | Refills: 0 | Status: SHIPPED | OUTPATIENT
Start: 2017-10-17 | End: 2017-10-27

## 2017-10-17 NOTE — TELEPHONE ENCOUNTER
Spoke with pt about results with understanding and pt states that she does have coughs, fever, and chills right now .

## 2017-10-17 NOTE — TELEPHONE ENCOUNTER
CXR shows pneumonia.  RX for antibiotic sent to pharmacy.  Any current cough, fever or chills ?????

## 2017-10-17 NOTE — TELEPHONE ENCOUNTER
----- Message from Quang Tovar sent at 10/17/2017 10:07 AM CDT -----  Pt is returning a call from nurse to discuss concerns regarding her chest x rays.          Please call pt back at [phone]

## 2017-10-17 NOTE — TELEPHONE ENCOUNTER
Treat fever with Advil and/or Tylenol depending on how high.  Cough can be treated with Robitussin or Mucinex.  Get plenty of rest and fluids.  Have her contact me back in 3 days to let me know how she is doing.

## 2017-10-30 ENCOUNTER — HOSPITAL ENCOUNTER (OUTPATIENT)
Dept: RADIOLOGY | Facility: HOSPITAL | Age: 32
Discharge: HOME | End: 2017-10-30
Attending: REGISTERED NURSE
Payer: COMMERCIAL

## 2017-10-30 DIAGNOSIS — J18.9 PNEUMONIA DUE TO INFECTIOUS ORGANISM, UNSPECIFIED LATERALITY, UNSPECIFIED PART OF LUNG: ICD-10-CM

## 2017-10-30 DIAGNOSIS — J18.9 PNEUMONIA DUE TO INFECTIOUS ORGANISM, UNSPECIFIED LATERALITY, UNSPECIFIED PART OF LUNG: Primary | ICD-10-CM

## 2017-10-30 PROCEDURE — 71020 XR CHEST PA AND LATERAL: CPT | Mod: TC,PO

## 2017-10-30 PROCEDURE — 71020 XR CHEST PA AND LATERAL: CPT | Mod: 26,,, | Performed by: RADIOLOGY

## 2017-10-31 ENCOUNTER — TELEPHONE (OUTPATIENT)
Dept: FAMILY MEDICINE | Facility: CLINIC | Age: 32
End: 2017-10-31

## 2018-08-30 ENCOUNTER — LAB VISIT (OUTPATIENT)
Dept: LAB | Facility: HOSPITAL | Age: 33
End: 2018-08-30
Attending: REGISTERED NURSE
Payer: COMMERCIAL

## 2018-08-30 ENCOUNTER — OFFICE VISIT (OUTPATIENT)
Dept: FAMILY MEDICINE | Facility: CLINIC | Age: 33
End: 2018-08-30
Payer: COMMERCIAL

## 2018-08-30 VITALS
RESPIRATION RATE: 17 BRPM | OXYGEN SATURATION: 99 % | HEART RATE: 97 BPM | WEIGHT: 293 LBS | TEMPERATURE: 99 F | DIASTOLIC BLOOD PRESSURE: 80 MMHG | HEIGHT: 67 IN | BODY MASS INDEX: 45.99 KG/M2 | SYSTOLIC BLOOD PRESSURE: 122 MMHG

## 2018-08-30 DIAGNOSIS — Z83.3 FAMILY HISTORY OF TYPE 2 DIABETES MELLITUS IN FATHER: ICD-10-CM

## 2018-08-30 DIAGNOSIS — R35.0 URINARY FREQUENCY: Primary | ICD-10-CM

## 2018-08-30 DIAGNOSIS — R63.1 POLYDIPSIA: ICD-10-CM

## 2018-08-30 DIAGNOSIS — R35.0 URINARY FREQUENCY: ICD-10-CM

## 2018-08-30 DIAGNOSIS — Z86.39 HISTORY OF INSULIN RESISTANCE: ICD-10-CM

## 2018-08-30 LAB
BILIRUB SERPL-MCNC: NEGATIVE MG/DL
BLOOD URINE, POC: 250
COLOR, POC UA: YELLOW
ESTIMATED AVG GLUCOSE: 111 MG/DL
GLUCOSE UR QL STRIP: NORMAL
HBA1C MFR BLD HPLC: 5.5 %
INSULIN COLLECTION INTERVAL: NORMAL
INSULIN SERPL-ACNC: 15.4 UU/ML
KETONES UR QL STRIP: NEGATIVE
LEUKOCYTE ESTERASE URINE, POC: ABNORMAL
NITRITE, POC UA: NEGATIVE
PH, POC UA: 5
PROTEIN, POC: NEGATIVE
SPECIFIC GRAVITY, POC UA: 1
UROBILINOGEN, POC UA: NORMAL

## 2018-08-30 PROCEDURE — 99999 PR PBB SHADOW E&M-EST. PATIENT-LVL IV: CPT | Mod: PBBFAC,,, | Performed by: REGISTERED NURSE

## 2018-08-30 PROCEDURE — 83525 ASSAY OF INSULIN: CPT

## 2018-08-30 PROCEDURE — 99214 OFFICE O/P EST MOD 30 MIN: CPT | Mod: 25,S$GLB,, | Performed by: REGISTERED NURSE

## 2018-08-30 PROCEDURE — 87086 URINE CULTURE/COLONY COUNT: CPT

## 2018-08-30 PROCEDURE — 83036 HEMOGLOBIN GLYCOSYLATED A1C: CPT

## 2018-08-30 PROCEDURE — 81002 URINALYSIS NONAUTO W/O SCOPE: CPT | Mod: S$GLB,,, | Performed by: REGISTERED NURSE

## 2018-08-30 NOTE — PROGRESS NOTES
"Subjective:       Patient ID: Marco Antonio Cisse is a 33 y.o. female.    Chief Complaint: Urinary Frequency      HPI    Marco Antonio reports urinary frequency for the past 3 days.  Urinating all day long, up at night.  Denies bladder pain, hematuria, back pain, fever or chills.  She is currently just finishing up menses.  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  Admits to poor eating habits, high carb intake.  Noted in chart to have a weight gain ~ 30 lbs since July.  Reports increased thirst and HA pain.  Does not drink much water daily.  Father with Type-2 diabetes, controlled now due to weight loss.      Review of Systems   Constitutional: Positive for unexpected weight change (gain ~ 30 lbs since 7/2018). Negative for appetite change, chills, diaphoresis, fatigue and fever.   Respiratory: Negative.    Cardiovascular: Negative.    Gastrointestinal: Negative for abdominal pain, nausea and vomiting.   Endocrine: Positive for polydipsia and polyuria. Negative for polyphagia.   Genitourinary: Positive for frequency and urgency. Negative for decreased urine volume, dysuria, hematuria and vaginal pain.   Skin: Negative.    Neurological: Negative.          Patient Active Problem List   Diagnosis    Migraine headache    Hypermenorrhea    Vitamin D deficiency disease    Insulin resistance    Irritable bowel syndrome (IBS)       Past medical, surgical, family and social histories have been reviewed today.        Objective:     Vitals:    08/30/18 1012   BP: 122/80   BP Location: Left arm   Patient Position: Sitting   BP Method: Large (Manual)   Pulse: 97   Resp: 17   Temp: 98.5 °F (36.9 °C)   TempSrc: Tympanic   SpO2: 99%   Weight: (!) 137.9 kg (304 lb 0.2 oz)   Height: 5' 7" (1.702 m)       Physical Exam   Constitutional: She is oriented to person, place, and time. She appears well-developed and well-nourished.   HENT:   Head: Normocephalic and atraumatic.   Eyes: Conjunctivae are normal. Pupils are equal, round, and " reactive to light.   Neck: Normal range of motion. Neck supple.   Cardiovascular: Normal rate and regular rhythm.   Pulmonary/Chest: Effort normal and breath sounds normal.   Abdominal: There is no tenderness. There is no CVA tenderness.   Neurological: She is alert and oriented to person, place, and time.   Vitals reviewed.        Medication List with Changes/Refills   Current Medications    ALBUTEROL 90 MCG/ACTUATION INHALER    Inhale 2 puffs into the lungs every 4 to 6 hours as needed for Wheezing.   Discontinued Medications    AZELASTINE (ASTELIN) 137 MCG NASAL SPRAY    1 spray (137 mcg total) by Nasal route 2 (two) times daily.    INDOMETHACIN (INDOCIN) 50 MG CAPSULE    Take 1 capsule (50 mg total) by mouth 3 (three) times daily with meals.    METHOCARBAMOL (ROBAXIN) 750 MG TAB    Take 1 tablet (750 mg total) by mouth 2 (two) times daily as needed.         Component      Latest Ref Rng & Units 8/30/2018   Color, UA       YELLOW   Spec Grav UA       1.000   pH, UA       5   WBC, UA       TRACE   Nitrite, UA       NEGATIVE   Protein       NEGATIVE   Glucose, UA       NORMAL   Ketones, UA       NEGATIVE   Urobilinogen, UA       NORMAL   Bilirubin       NEGATIVE   RBC, UA       250         Diagnosis       1. Urinary frequency    2. Polydipsia    3. History of insulin resistance    4. Family history of type 2 diabetes mellitus in father          Assessment/ Plan     Urinary frequency  · PT with c/o increased urination x 3 days, urine dip today positive mainly for blood and trace leukocytes (likely due to menses).  · Orders:  -     POCT urine dipstick without microscope  -     Urine culture  -     Insulin, random; Future; Expected date: 08/30/2018  -     Hemoglobin A1c; Future; Expected date: 08/30/2018    Polydipsia  -     Insulin, random; Future; Expected date: 08/30/2018  -     Hemoglobin A1c; Future; Expected date: 08/30/2018    History of insulin resistance  -     Insulin, random; Future; Expected date:  08/30/2018  -     Hemoglobin A1c; Future; Expected date: 08/30/2018    Family history of type 2 diabetes mellitus in father  -     Insulin, random; Future; Expected date: 08/30/2018  -     Hemoglobin A1c; Future; Expected date: 08/30/2018      PT with h/o IRS, now having increased thirst and urination.  Has had significant weight gain over past month with reports of high-carb diet.  Father with Type-2 DM but presently controlled due to increased weight loss.  Further txmt plan pending lab results.  RTC prn.        ADALBERTO Chavez  [facility]

## 2018-08-31 ENCOUNTER — TELEPHONE (OUTPATIENT)
Dept: FAMILY MEDICINE | Facility: CLINIC | Age: 33
End: 2018-08-31

## 2018-08-31 NOTE — TELEPHONE ENCOUNTER
----- Message from Connie Moralesite sent at 8/31/2018  1:28 PM CDT -----  Contact: Pt  Pt called and stated she needs to get her test results. She can be reached at [phone] (home)     Thanks,  TF

## 2018-09-01 LAB
BACTERIA UR CULT: NORMAL
BACTERIA UR CULT: NORMAL

## 2018-09-04 NOTE — TELEPHONE ENCOUNTER
----- Message from Florentin Cramer sent at 9/4/2018 11:01 AM CDT -----  Contact: pt   Pt calling to get test results.         ..[phone] (wjfv)

## 2018-09-18 ENCOUNTER — TELEPHONE (OUTPATIENT)
Dept: FAMILY MEDICINE | Facility: CLINIC | Age: 33
End: 2018-09-18

## 2018-09-18 RX ORDER — BUTALBITAL, ACETAMINOPHEN AND CAFFEINE 50; 325; 40 MG/1; MG/1; MG/1
1 TABLET ORAL
Qty: 30 TABLET | Refills: 0 | Status: SHIPPED | OUTPATIENT
Start: 2018-09-18 | End: 2019-06-10 | Stop reason: SDUPTHER

## 2018-09-18 NOTE — TELEPHONE ENCOUNTER
Pt stated that headaches started on Friday with no other symptoms and that she has been taking BC only and has not had any relief.//ky

## 2018-09-18 NOTE — TELEPHONE ENCOUNTER
----- Message from Suzanne Crum LPN sent at 9/18/2018 10:51 AM CDT -----  Contact: self [phone]      ----- Message -----  From: Donita Ty  Sent: 9/18/2018  10:44 AM  To: Susy RIZO Staff    States that she is calling to get something called in for a headache. Pt uses     [facility] [place] - [place] - [address] AT [facility] [address]  [address]  [place] [address]  Phone: [phone] Fax: [phone]    Please call back at [phone]//thank you acc

## 2018-09-24 ENCOUNTER — TELEPHONE (OUTPATIENT)
Dept: FAMILY MEDICINE | Facility: CLINIC | Age: 33
End: 2018-09-24

## 2018-09-24 RX ORDER — TRIAMTERENE AND HYDROCHLOROTHIAZIDE 37.5; 25 MG/1; MG/1
1 CAPSULE ORAL DAILY PRN
Qty: 30 CAPSULE | Refills: 0 | Status: SHIPPED | OUTPATIENT
Start: 2018-09-24 | End: 2018-10-18 | Stop reason: SDUPTHER

## 2018-09-24 NOTE — TELEPHONE ENCOUNTER
Refilled x 1 month only.  She is overdue for her annual and will need to be seen for any further refills.

## 2018-09-24 NOTE — TELEPHONE ENCOUNTER
----- Message from Griselda Owens sent at 9/24/2018  8:36 AM CDT -----  Contact: pt  States she would like to see if she can get a refill on her fluid pill or does she need to come in. Please call pt at [phone]. Thank you

## 2018-10-18 RX ORDER — TRIAMTERENE AND HYDROCHLOROTHIAZIDE 37.5; 25 MG/1; MG/1
CAPSULE ORAL
Qty: 30 CAPSULE | Refills: 0 | Status: SHIPPED | OUTPATIENT
Start: 2018-10-18 | End: 2019-03-07

## 2018-11-28 ENCOUNTER — HOSPITAL ENCOUNTER (OUTPATIENT)
Dept: RADIOLOGY | Facility: HOSPITAL | Age: 33
Discharge: HOME | End: 2018-11-28
Attending: REGISTERED NURSE
Payer: COMMERCIAL

## 2018-11-28 ENCOUNTER — OFFICE VISIT (OUTPATIENT)
Dept: FAMILY MEDICINE | Facility: CLINIC | Age: 33
End: 2018-11-28
Payer: COMMERCIAL

## 2018-11-28 VITALS
TEMPERATURE: 98 F | DIASTOLIC BLOOD PRESSURE: 90 MMHG | SYSTOLIC BLOOD PRESSURE: 128 MMHG | HEART RATE: 93 BPM | HEIGHT: 66 IN | OXYGEN SATURATION: 98 % | BODY MASS INDEX: 47.09 KG/M2 | WEIGHT: 293 LBS

## 2018-11-28 DIAGNOSIS — E88.819 INSULIN RESISTANCE: ICD-10-CM

## 2018-11-28 DIAGNOSIS — G43.909 MIGRAINE WITHOUT STATUS MIGRAINOSUS, NOT INTRACTABLE, UNSPECIFIED MIGRAINE TYPE: ICD-10-CM

## 2018-11-28 DIAGNOSIS — R06.89 DYSPNEA AND RESPIRATORY ABNORMALITIES: ICD-10-CM

## 2018-11-28 DIAGNOSIS — K58.9 IRRITABLE BOWEL SYNDROME, UNSPECIFIED TYPE: ICD-10-CM

## 2018-11-28 DIAGNOSIS — R60.9 FLUID RETENTION: ICD-10-CM

## 2018-11-28 DIAGNOSIS — Z00.00 ANNUAL PHYSICAL EXAM: Primary | ICD-10-CM

## 2018-11-28 DIAGNOSIS — R06.00 DYSPNEA AND RESPIRATORY ABNORMALITIES: ICD-10-CM

## 2018-11-28 DIAGNOSIS — E55.9 VITAMIN D DEFICIENCY DISEASE: ICD-10-CM

## 2018-11-28 PROCEDURE — 99999 PR PBB SHADOW E&M-EST. PATIENT-LVL III: CPT | Mod: PBBFAC,,, | Performed by: REGISTERED NURSE

## 2018-11-28 PROCEDURE — 90471 IMMUNIZATION ADMIN: CPT | Mod: S$GLB,,, | Performed by: REGISTERED NURSE

## 2018-11-28 PROCEDURE — 71046 X-RAY EXAM CHEST 2 VIEWS: CPT | Mod: TC,FY,PO

## 2018-11-28 PROCEDURE — 90686 IIV4 VACC NO PRSV 0.5 ML IM: CPT | Mod: S$GLB,,, | Performed by: REGISTERED NURSE

## 2018-11-28 PROCEDURE — 99395 PREV VISIT EST AGE 18-39: CPT | Mod: 25,S$GLB,, | Performed by: REGISTERED NURSE

## 2018-11-28 PROCEDURE — 71046 X-RAY EXAM CHEST 2 VIEWS: CPT | Mod: 26,,, | Performed by: RADIOLOGY

## 2018-11-28 RX ORDER — ALBUTEROL SULFATE 90 UG/1
2 AEROSOL, METERED RESPIRATORY (INHALATION)
Qty: 18 G | Refills: 3 | Status: SHIPPED | OUTPATIENT
Start: 2018-11-28 | End: 2019-06-10 | Stop reason: SDUPTHER

## 2018-11-30 ENCOUNTER — CLINICAL SUPPORT (OUTPATIENT)
Dept: PULMONOLOGY | Facility: CLINIC | Age: 33
End: 2018-11-30
Payer: COMMERCIAL

## 2018-11-30 DIAGNOSIS — R06.00 DYSPNEA AND RESPIRATORY ABNORMALITIES: ICD-10-CM

## 2018-11-30 DIAGNOSIS — R06.89 DYSPNEA AND RESPIRATORY ABNORMALITIES: ICD-10-CM

## 2018-11-30 LAB
BRPFT: ABNORMAL
DLCO ADJ PRE: 18.81 ML/(MIN*MMHG) (ref 22.15–33.61)
DLCO SINGLE BREATH LLN: 22.15
DLCO SINGLE BREATH PRE REF: 66.8 %
DLCO SINGLE BREATH REF: 27.88
DLCOC SBVA LLN: 3.8
DLCOC SBVA PRE REF: 112.6 %
DLCOC SBVA REF: 5.26
DLCOC SINGLE BREATH LLN: 22.15
DLCOC SINGLE BREATH PRE REF: 67.5 %
DLCOC SINGLE BREATH REF: 27.88
DLCOVA LLN: 3.8
DLCOVA PRE REF: 111.5 %
DLCOVA PRE: 5.87 ML/(MIN*MMHG*L) (ref 3.8–6.72)
DLCOVA REF: 5.26
DLVAADJ PRE: 5.92 ML/(MIN*MMHG*L) (ref 3.8–6.72)
ERV LLN: 1.21
ERV PRE REF: 19.8 %
ERV REF: 1.21
ERVN2 LLN: 1.21
ERVN2 REF: 1.21
FEF 25 75 CHG: 2.4 %
FEF 25 75 LLN: 1.78
FEF 25 75 POST REF: 93.3 %
FEF 25 75 PRE REF: 91.2 %
FEF 25 75 REF: 3.16
FET100 CHG: 0.2 %
FEV1 CHG: -5.3 %
FEV1 FVC CHG: 2.4 %
FEV1 FVC LLN: 73
FEV1 FVC POST REF: 101.3 %
FEV1 FVC PRE REF: 98.9 %
FEV1 FVC REF: 84
FEV1 LLN: 2.26
FEV1 POST REF: 80 %
FEV1 PRE REF: 84.5 %
FEV1 REF: 2.9
FEV6 CHG: -6.7 %
FEV6 LLN: 2.61
FEV6 POST REF: 81.2 %
FEV6 POST: 2.73 L (ref 2.61–4.11)
FEV6 PRE REF: 87 %
FEV6 PRE: 2.93 L (ref 2.61–4.11)
FEV6 REF: 3.36
FRCN2 LLN: 1.97
FRCN2 REF: 2.8
FRCPLETH LLN: 1.97
FRCPLETH PREREF: 56.6 %
FRCPLETH REF: 2.8
FVC CHG: -7.5 %
FVC LLN: 2.72
FVC POST REF: 78.6 %
FVC PRE REF: 85 %
FVC REF: 3.47
IVC PRE: 2.29 L (ref 2.72–4.23)
IVC SINGLE BREATH LLN: 2.72
IVC SINGLE BREATH PRE REF: 65.9 %
IVC SINGLE BREATH REF: 3.47
MVV LLN: 108
MVV PRE REF: 53.4 %
MVV REF: 127
PEF CHG: 5.6 %
PEF LLN: 4.99
PEF POST REF: 81.8 %
PEF PRE REF: 77.5 %
PEF REF: 7.13
POST FEF 25 75: 2.95 L/S (ref 1.78–4.53)
POST FET 100: 7.3 SEC
POST FEV1 FVC: 85.1 % (ref 73.05–94.97)
POST FEV1: 2.32 L (ref 2.26–3.55)
POST FVC: 2.73 L (ref 2.72–4.23)
POST PEF: 5.83 L/S (ref 4.99–9.27)
PRE DLCO: 18.63 ML/(MIN*MMHG) (ref 22.15–33.61)
PRE ERV: 0.24 L (ref 1.21–1.21)
PRE FEF 25 75: 2.88 L/S (ref 1.78–4.53)
PRE FET 100: 7.29 SEC
PRE FEV1 FVC: 83.07 % (ref 73.05–94.97)
PRE FEV1: 2.45 L (ref 2.26–3.55)
PRE FRC PL: 1.58 L
PRE FVC: 2.95 L (ref 2.72–4.23)
PRE MVV: 68 L/MIN (ref 108.32–146.56)
PRE PEF: 5.52 L/S (ref 4.99–9.27)
PRE RV: 1.25 L (ref 1.01–2.16)
PRE TLC: 4.2 L (ref 4.31–6.29)
RAW LLN: 3.06
RAW PRE REF: 160.4 %
RAW PRE: 4.91 CMH2O*S/L (ref 3.06–3.06)
RAW REF: 3.06
RV LLN: 1.01
RV PRE REF: 78.8 %
RV REF: 1.58
RVN2 LLN: 1.01
RVN2 REF: 1.58
RVN2TLCN2 LLN: 21
RVN2TLCN2 REF: 31
RVTLC LLN: 21
RVTLC PRE REF: 97.4 %
RVTLC PRE: 29.72 % (ref 20.93–40.11)
RVTLC REF: 31
TLC LLN: 4.31
TLC PRE REF: 79.3 %
TLC REF: 5.3
TLCN2 LLN: 4.31
TLCN2 REF: 5.3
VA PRE: 3.18 L (ref 5.15–5.15)
VA SINGLE BREATH LLN: 5.15
VA SINGLE BREATH PRE REF: 61.7 %
VA SINGLE BREATH REF: 5.15
VC LLN: 2.72
VC PRE REF: 85 %
VC PRE: 2.95 L (ref 2.72–4.23)
VC REF: 3.47
VCMAXN2 LLN: 2.72
VCMAXN2 REF: 3.47
VTGRAWPRE: 1.93 L

## 2018-11-30 PROCEDURE — 94726 PLETHYSMOGRAPHY LUNG VOLUMES: CPT | Mod: S$GLB,,, | Performed by: INTERNAL MEDICINE

## 2018-11-30 PROCEDURE — 94729 DIFFUSING CAPACITY: CPT | Mod: S$GLB,,, | Performed by: INTERNAL MEDICINE

## 2018-11-30 PROCEDURE — 94060 EVALUATION OF WHEEZING: CPT | Mod: S$GLB,,, | Performed by: INTERNAL MEDICINE

## 2018-12-04 ENCOUNTER — TELEPHONE (OUTPATIENT)
Dept: FAMILY MEDICINE | Facility: CLINIC | Age: 33
End: 2018-12-04

## 2018-12-04 DIAGNOSIS — J98.4 LUNG DISEASE, RESTRICTIVE: Primary | ICD-10-CM

## 2018-12-04 NOTE — TELEPHONE ENCOUNTER
Pt notified of PFT results. Scheduled pulmonary appt. Advised to contact office if problems persist. Verbalized understanding.//ky

## 2018-12-06 PROBLEM — R60.9 FLUID RETENTION: Status: ACTIVE | Noted: 2018-12-06

## 2018-12-06 NOTE — PROGRESS NOTES
"Subjective:       Patient ID: Marco Antonio Cisse is a 33 y.o. female.    Chief Complaint: Annual Exam      HPI    Marco Antonio Cisse is here today for an annual wellness exam.  I have reviewed the patient's medical history in detail and updated the computerized patient record.    GYN --- followed by Dr. Lau      Review of Systems   Constitutional: Negative for activity change, appetite change, chills, diaphoresis, fatigue, fever and unexpected weight change.   HENT: Negative.    Eyes: Negative.    Respiratory: Positive for shortness of breath. Negative for cough, wheezing and stridor.         Reports feeling short-winded and "smothered" for the past 4 to 5 months.  Often occurs randomly, in car driving, lying in bed.  Often has to sit up in bed in order to breathe.  Nonsmoker, no PSE.   Cardiovascular: Negative for chest pain, palpitations and leg swelling.   Gastrointestinal: Negative.    Endocrine: Negative for polydipsia, polyphagia and polyuria.   Genitourinary: Negative.    Musculoskeletal: Negative.    Skin: Negative.    Neurological: Negative.    Hematological: Negative for adenopathy. Does not bruise/bleed easily.   Psychiatric/Behavioral: Positive for sleep disturbance (dyspnea). Negative for decreased concentration and dysphoric mood. The patient is not nervous/anxious.          Review of patient's allergies indicates:  No Known Allergies    Current Outpatient Medications on File Prior to Visit   Medication Sig Dispense Refill    butalbital-acetaminophen-caffeine -40 mg (FIORICET, ESGIC) -40 mg per tablet Take 1 tablet by mouth every 4 to 6 hours as needed for Headaches. 30 tablet 0    ergocalciferol, vitamin D2, (VITAMIN D ORAL) Take 800 Int'l Units by mouth once daily.      triamterene-hydrochlorothiazide 37.5-25 mg (DYAZIDE) 37.5-25 mg per capsule TAKE 1 CAPSULE BY MOUTH DAILY AS NEEDED FOR LEG SWELLING 30 capsule 0     No current facility-administered medications on " "file prior to visit.        Patient Active Problem List   Diagnosis    Migraine headache    Hypermenorrhea    Vitamin D deficiency disease    Insulin resistance    Irritable bowel syndrome (IBS)       Past Medical History:   Diagnosis Date    IBS (irritable bowel syndrome)     Insulin resistance syndrome     Migraines     Vitamin D deficiency disease        Past Surgical History:   Procedure Laterality Date    KELOID EXCISION      SHOULDER ARTHROSCOPY      TUBAL LIGATION         Family History   Problem Relation Age of Onset    Hypertension Mother     Arthritis Mother     Diabetes Father     Hypertension Father     Stroke Father     Colon polyps Father     Arthritis Maternal Aunt     Lupus Cousin     Cancer Neg Hx        Social History     Socioeconomic History    Marital status:     Number of children: 2   Occupational History    Occupation:      Employer: [employer]   Tobacco Use    Smoking status: Never Smoker    Smokeless tobacco: Never Used   Substance and Sexual Activity    Alcohol use: No     Alcohol/week: 0.0 oz    Drug use: No    Sexual activity: Yes     Birth control/protection: Surgical             Objective:     Vitals:    11/28/18 1130   BP: (!) 128/90   Pulse: 93   Temp: 98.1 °F (36.7 °C)   TempSrc: Oral   SpO2: 98%   Weight: (!) 139.1 kg (306 lb 10.6 oz)   Height: 5' 6" (1.676 m)   PainSc: 0-No pain         Physical Exam   Constitutional: She is oriented to person, place, and time. She appears well-developed and well-nourished. No distress.   HENT:   Head: Normocephalic and atraumatic.   Right Ear: External ear normal.   Left Ear: External ear normal.   Nose: Nose normal.   Mouth/Throat: Oropharynx is clear and moist. No oropharyngeal exudate.   Eyes: Conjunctivae and EOM are normal. Pupils are equal, round, and reactive to light. Right eye exhibits no discharge. Left eye exhibits no discharge. No scleral icterus.   Neck: Normal range of motion. " Neck supple. No JVD present. No tracheal deviation present. No thyromegaly present.   Cardiovascular: Normal rate, regular rhythm, normal heart sounds and intact distal pulses. Exam reveals no gallop and no friction rub.   No murmur heard.  Pulmonary/Chest: Effort normal and breath sounds normal. No respiratory distress. She has no wheezes. She has no rales. She exhibits no tenderness.   Abdominal: Soft. Bowel sounds are normal. She exhibits no distension and no mass. There is no tenderness.   Musculoskeletal: Normal range of motion. She exhibits no edema, tenderness or deformity.   Lymphadenopathy:     She has no cervical adenopathy.   Neurological: She is alert and oriented to person, place, and time. No sensory deficit. She exhibits normal muscle tone. Coordination normal.   Skin: Skin is warm and dry. Capillary refill takes less than 2 seconds. No rash noted. She is not diaphoretic.   Psychiatric: She has a normal mood and affect. Her behavior is normal. Judgment and thought content normal.   Vitals reviewed.        Diagnosis       1. Annual physical exam    2. Insulin resistance    3. Irritable bowel syndrome, unspecified type    4. Vitamin D deficiency disease    5. Migraine without status migrainosus, not intractable, unspecified migraine type    6. Fluid retention    7. Dyspnea and respiratory abnormalities          Assessment/ Plan     Annual physical exam  -     CBC auto differential; Future; Expected date: 11/28/2018  -     Comprehensive metabolic panel; Future; Expected date: 11/28/2018  -     TSH; Future; Expected date: 11/28/2018  -     Lipid panel; Future; Expected date: 11/28/2018  -     Vitamin D; Future; Expected date: 11/28/2018    Insulin resistance  · On no medication currently, tries to control with diet and exercise.  -     CBC auto differential; Future; Expected date: 11/28/2018  -     Comprehensive metabolic panel; Future; Expected date: 11/28/2018  -     TSH; Future; Expected date:  11/28/2018  -     Lipid panel; Future; Expected date: 11/28/2018  -     Vitamin D; Future; Expected date: 11/28/2018    Irritable bowel syndrome, unspecified type  · Stable, avoid triggers.  · Usually predominantly diarrhea.    Vitamin D deficiency disease  · History of Vitamin-D deficiency, on supplement, to check lab.  -     Vitamin D; Future; Expected date: 11/28/2018    Migraine without status migrainosus, not intractable, unspecified migraine type  · Stable at this time, pain med prn.    Fluid retention  · Stable at this time, takes diuretic prn.    Dyspnea and respiratory abnormalities  · New problem reported, work-up to be done.  -     X-Ray Chest PA And Lateral; Future; Expected date: 11/28/2018  -     Complete PFT with/without bronchodilator; Future  -     PULSE OXIMETRY WITH REST - PULM; Future  -     albuterol (PROVENTIL/VENTOLIN HFA) 90 mcg/actuation inhaler; Inhale 2 puffs into the lungs every 4 to 6 hours as needed for Wheezing.  Dispense: 18 g; Refill: 3    Other orders  -     Influenza - Quadrivalent (3 years & older) (PF)          Flu shot today.  Lab pending.  CXR and PFT report pending --- may need to see Pulmonary.  Follow-up in clinic as needed.          ADALBERTO Chavez  [facility]

## 2018-12-10 ENCOUNTER — TELEPHONE (OUTPATIENT)
Dept: FAMILY MEDICINE | Facility: CLINIC | Age: 33
End: 2018-12-10

## 2018-12-10 NOTE — TELEPHONE ENCOUNTER
Pt notified that labs have not been reviewed but pt will be notified as soon as labs are released. Verbalized understanding.//ky

## 2018-12-10 NOTE — TELEPHONE ENCOUNTER
----- Message from Nathalia Silverman sent at 12/10/2018  1:27 PM CST -----  Contact: self  Requesting call back regarding test results. Please call back at [phone].      Thanks,  Nathalia Silverman

## 2018-12-12 ENCOUNTER — OFFICE VISIT (OUTPATIENT)
Dept: PULMONOLOGY | Facility: CLINIC | Age: 33
End: 2018-12-12
Payer: COMMERCIAL

## 2018-12-12 ENCOUNTER — TELEPHONE (OUTPATIENT)
Dept: PULMONOLOGY | Facility: CLINIC | Age: 33
End: 2018-12-12

## 2018-12-12 VITALS
RESPIRATION RATE: 18 BRPM | HEIGHT: 66 IN | DIASTOLIC BLOOD PRESSURE: 82 MMHG | BODY MASS INDEX: 47.09 KG/M2 | SYSTOLIC BLOOD PRESSURE: 128 MMHG | HEART RATE: 66 BPM | WEIGHT: 293 LBS | OXYGEN SATURATION: 99 %

## 2018-12-12 DIAGNOSIS — R06.83 SNORING: ICD-10-CM

## 2018-12-12 DIAGNOSIS — R51.9 MORNING HEADACHE: ICD-10-CM

## 2018-12-12 DIAGNOSIS — R29.818 SUSPECTED SLEEP APNEA: Primary | ICD-10-CM

## 2018-12-12 DIAGNOSIS — R06.02 SOBOE (SHORTNESS OF BREATH ON EXERTION): ICD-10-CM

## 2018-12-12 PROCEDURE — 99999 PR PBB SHADOW E&M-EST. PATIENT-LVL III: CPT | Mod: PBBFAC,,, | Performed by: INTERNAL MEDICINE

## 2018-12-12 PROCEDURE — 99214 OFFICE O/P EST MOD 30 MIN: CPT | Mod: S$GLB,,, | Performed by: INTERNAL MEDICINE

## 2018-12-12 NOTE — PROGRESS NOTES
Initial Outpatient Pulmonary Evaluation       SUBJECTIVE:     History of Present Illness:  Patient is a 33 y.o. female presenting for evaluation of shortness of breath on exertion.  Patient has been feeling for the last 3 months short of breath at rest and on exertion sometimes with ordinary daily activities.    Denies coughing denies wheezing denies chest pain or chest tightness.    She does have a family history of asthma, 2 sounds have asthma and her mother has asthma she is not a smoker.    Never smoker.    Works as a [employer].    Has snoring sometimes morning headaches.  [place] Sleepiness Scale score today of 2.        Snoring ?  Do you Snore Loudly (loud enough to be heard through closed doors or your bed-partner elbows you for snoring at night)?   Yes        Tired ?  Do you often feel Tired, Fatigued, or Sleepy during the daytime (such as falling asleep during driving or talking to someone)?     Yes     Observed ?  Has anyone Observed you Stop Breathing or Choking/Gasping during your sleep ?     No     Pressure ?  Do you have or are being treated for High Blood Pressure ?     no      Body Mass Index more than 35 kg/m2?     Yes    Age older than 50 ?     No    Neck size large ? (Measured around [place])  For male, is your shirt collar 17 inches / 43cm or larger?  For female, is your shirt collar 16 inches / 41cm or larger?    No    Gender = Male ?     No    Score of 3  Chief Complaint   Patient presents with    Shortness of Breath       Review of patient's allergies indicates:  No Known Allergies    Current Outpatient Medications   Medication Sig Dispense Refill    albuterol (PROVENTIL/VENTOLIN HFA) 90 mcg/actuation inhaler Inhale 2 puffs into the lungs every 4 to 6 hours as needed for Wheezing. 18 g 3    butalbital-acetaminophen-caffeine -40 mg (FIORICET, ESGIC) -40 mg per tablet Take 1 tablet by mouth every 4 to 6 hours as needed for  Headaches. 30 tablet 0    ergocalciferol, vitamin D2, (VITAMIN D ORAL) Take 800 Int'l Units by mouth once daily.      triamterene-hydrochlorothiazide 37.5-25 mg (DYAZIDE) 37.5-25 mg per capsule TAKE 1 CAPSULE BY MOUTH DAILY AS NEEDED FOR LEG SWELLING 30 capsule 0     No current facility-administered medications for this visit.        Past Medical History:   Diagnosis Date    IBS (irritable bowel syndrome)     Insulin resistance syndrome     Migraines     Vitamin D deficiency disease      Past Surgical History:   Procedure Laterality Date    COLONOSCOPY N/A 10/7/2015    Procedure: COLONOSCOPY;  Surgeon: Kris Barker MD;  Location: [facility] ENDO;  Service: Endoscopy;  Laterality: N/A;    COLONOSCOPY N/A 10/7/2015    Performed by Kris Barker MD at [facility] ENDO    KELOID EXCISION      SHOULDER ARTHROSCOPY      TUBAL LIGATION       Family History   Problem Relation Age of Onset    Hypertension Mother     Arthritis Mother     Diabetes Father     Hypertension Father     Stroke Father     Colon polyps Father     Arthritis Maternal Aunt     Lupus Cousin     Cancer Neg Hx      Social History     Tobacco Use    Smoking status: Never Smoker    Smokeless tobacco: Never Used   Substance Use Topics    Alcohol use: No     Alcohol/week: 0.0 oz    Drug use: No        Review of Systems:  Review of Systems   Constitutional: Positive for fatigue.   HENT:        Snoring   Eyes: Negative for redness.   Respiratory: Positive for shortness of breath.    Cardiovascular: Negative for chest pain.   Gastrointestinal: Negative for abdominal pain.   Genitourinary: Negative for hematuria.   Musculoskeletal: Negative for gait problem.   Skin: Negative for wound.   Allergic/Immunologic: Negative for immunocompromised state.   Neurological: Positive for headaches.   Hematological: Negative for adenopathy.   Psychiatric/Behavioral: Positive for sleep disturbance.        Snoring       OBJECTIVE:     Vital Signs (Most  "Recent)  Pulse: 66 (12/12/18 0818)  Resp: 18 (12/12/18 0818)  BP: 128/82 (12/12/18 0818)  SpO2: 99 % (12/12/18 0818)  5' 6" (1.676 m)  (!) 139.7 kg (307 lb 15.7 oz)     Physical Exam:  Physical Exam   Constitutional: She is oriented to person, place, and time. She appears well-developed and well-nourished. No distress.   HENT:   Head: Normocephalic and atraumatic.   Mallampati 3   Eyes: EOM are normal.   Neck: Neck supple.   15 in neck circumference   Cardiovascular: Normal rate and regular rhythm.   Pulmonary/Chest: Effort normal.   Breath sounds decreased bilaterally   Abdominal: Soft. There is no tenderness.   Musculoskeletal: She exhibits no edema.   Neurological: She is alert and oriented to person, place, and time.   Psychiatric: She has a normal mood and affect. Her behavior is normal. Judgment and thought content normal.       Laboratory    Lab Results   Component Value Date    WBC 7.10 11/28/2018    HGB 13.1 11/28/2018    HCT 41.4 11/28/2018    MCV 84 11/28/2018     11/28/2018     BMP  Lab Results   Component Value Date     11/28/2018    K 4.1 11/28/2018     11/28/2018    CO2 27 11/28/2018    BUN 9 11/28/2018    CREATININE 0.7 11/28/2018    CALCIUM 9.6 11/28/2018    ANIONGAP 7 (L) 11/28/2018    ESTGFRAFRICA >60.0 11/28/2018    EGFRNONAA >60.0 11/28/2018     BNP  @LABRCNTIP(BNP,BNPTRIAGEBLO)@  Lab Results   Component Value Date    TSH 0.892 11/28/2018     ABG  @LABRCNTIP(PH,PO2,PCO2,HCO3,BE)@    Diagnostic Results:  Chest x-ray personally reviewed clear lungs.    PFT personally reviewed shows mild restriction and mild DLCO reduction.    ASSESSMENT/PLAN:     Suspected sleep apnea  -     Home Sleep Studies; Future    Snoring  -     Home Sleep Studies; Future    Morning headache  -     Home Sleep Studies; Future    SOBOE (shortness of breath on exertion)  -     Stress test, pulmonary; Future    BMI 45.0-49.9, adult  -     Ambulatory consult to Nutrition Services        General weight " loss/lifestyle modification strategies discussed (elicit support from others; identify saboteurs; non-food rewards).  Diet interventions: low calorie (1000 kCal/d) deficit diet    Up-to-date on influenza vaccination.      Follow-up in about 2 months (around 2/12/2019).    This note was prepared using voice recognition system and is likely to have sound alike errors that may have been overlooked even after proof reading.  Please call me with any questions    Discussed diagnosis, its evaluation, treatment and usual course. All questions answered.    Thank you for the courtesy of participating in the care of this patient    Antonio Tao MD

## 2018-12-13 ENCOUNTER — TELEPHONE (OUTPATIENT)
Dept: DIABETES | Facility: CLINIC | Age: 33
End: 2018-12-13

## 2018-12-17 ENCOUNTER — CLINICAL SUPPORT (OUTPATIENT)
Dept: PULMONOLOGY | Facility: CLINIC | Age: 33
End: 2018-12-17
Payer: COMMERCIAL

## 2018-12-17 VITALS — BODY MASS INDEX: 47.09 KG/M2 | WEIGHT: 293 LBS | HEIGHT: 66 IN

## 2018-12-17 DIAGNOSIS — R06.02 SOBOE (SHORTNESS OF BREATH ON EXERTION): ICD-10-CM

## 2018-12-17 PROCEDURE — 94618 PULMONARY STRESS TESTING: CPT | Mod: S$GLB,,, | Performed by: INTERNAL MEDICINE

## 2018-12-17 NOTE — PROCEDURES
"[facility]  Six Minute Walk     SUMMARY     Ordering Provider: Dinh   Interpreting Provider: Dinh  Performing nurse/tech/RT: Gloria Harper-Filemon  Diagnosis: (SOBOE)  Height: 5' 6" (167.6 cm)  Weight: (!) 137 kg (302 lb)  BMI (Calculated): 48.8   Patient Race:             Phase Oxygen Assessment Supplemental O2 Heart   Rate Blood Pressure Judy Dyspnea Scale Rating   Resting 98 % Room Air 71 bpm (!) 137/91 5-6   Exercise        Minute        1 95 % Room Air 85 bpm     2 94 % Room Air 87 bpm     3 97 % Room Air 103 bpm     4 96 % Room Air 115 bpm     5 96 % Room Air 115 bpm     6  96 % Room Air 115 bpm 121/79 4   Recovery        Minute        1 98 % Room Air 85 bpm     2 97 % Room Air 92 bpm     3 98 % Room Air 94 bpm     4 99 % Room Air 85 bpm 104/66 2     Six Minute Walk Summary  6MWT Status: completed without stopping  Patient Reported: No complaints     Interpretation:  Did the patient stop or pause?: No                                         Total Time Walked (Calculated): 360 seconds  Final Partial Lap Distance (feet): 0 feet  Total Distance Meters (Calculated): 487.68 meters  Predicted Distance Meters (Calculated): 510.42 meters  Percentage of Predicted (Calculated): 95.54  Peak VO2 (Calculated): 18.61  Mets: 5.32  Has The Patient Had a Previous Six Minute Walk Test?: No       Previous 6MWT Results  Has The Patient Had a Previous Six Minute Walk Test?: No  "

## 2018-12-20 ENCOUNTER — CLINICAL SUPPORT (OUTPATIENT)
Dept: DIABETES | Facility: CLINIC | Age: 33
End: 2018-12-20
Payer: COMMERCIAL

## 2018-12-20 ENCOUNTER — PROCEDURE VISIT (OUTPATIENT)
Dept: SLEEP MEDICINE | Facility: CLINIC | Age: 33
End: 2018-12-20
Payer: COMMERCIAL

## 2018-12-20 VITALS — BODY MASS INDEX: 47.09 KG/M2 | HEIGHT: 66 IN | WEIGHT: 293 LBS

## 2018-12-20 DIAGNOSIS — R06.83 PRIMARY SNORING: Primary | ICD-10-CM

## 2018-12-20 DIAGNOSIS — R29.818 SUSPECTED SLEEP APNEA: ICD-10-CM

## 2018-12-20 DIAGNOSIS — R51.9 MORNING HEADACHE: ICD-10-CM

## 2018-12-20 PROCEDURE — 97802 MEDICAL NUTRITION INDIV IN: CPT | Mod: S$GLB,,, | Performed by: DIETITIAN, REGISTERED

## 2018-12-20 PROCEDURE — 99999 PR PBB SHADOW E&M-EST. PATIENT-LVL II: CPT | Mod: PBBFAC,,, | Performed by: DIETITIAN, REGISTERED

## 2018-12-20 PROCEDURE — 99499 UNLISTED E&M SERVICE: CPT | Mod: S$GLB,,, | Performed by: INTERNAL MEDICINE

## 2018-12-20 PROCEDURE — 95800 SLP STDY UNATTENDED: CPT | Mod: 26,,, | Performed by: INTERNAL MEDICINE

## 2018-12-20 PROCEDURE — 95800 SLP STDY UNATTENDED: CPT | Mod: TC

## 2018-12-20 NOTE — Clinical Note
PHYSICIAN INTERPRETATION AND COMMENTS: Findings are consistent with PRIMARY SNORING. NONDIAGNOSTIC for significant Obstructive sleep apnea. Consider repeat test of Attended/INLABPOLYSOMNOGRAPHY.AHI 2.0/hrCLINICAL HISTORY: MRN: [id number]: 33 year old female presented with: Has snoring sometimes morning headaches.[place] Sleepiness Scale score today of 2. STOPBAND score ;Score of 3. Comorbidity: Insulin resistance and Hypertension.Mallampati 3 BMI 49.71 kg/m² . 14.5 inch neck.SLEEP STUDY FINDINGS: Patient underwent a one night Home Sleep Test and by behavioral criteria, slept forapproximately 6 hours, with a sleep latency of 11 minutes and a sleep efficiency of 84.8%. The patient slept supine 30.3% ofthe night based on valid recording time of 5.97 hours. AHI 2.0/hr .When considering more subtle measures of sleep disorderedbreathing, the overall respiratory disturbance index is mild (RDI=11) based on a 1% hypopnea desaturation criteria withconfirmation by surrogate arousal indicators. The apnea

## 2018-12-20 NOTE — PROGRESS NOTES
"PCP: Randal Jain NP   REFERRING PROVIDER: Antonio Tao MD     HISTORY OF PRESENT ILLNESS: 33 y.o. female patient is in clinic today for weight management. Since appt with Pulmonologist, pt has started changing eating habits.     6.5 lbs lost since visit with Dr. Tao 8 days ago.     VITAL SIGNS:  Height: 5' 6" (167.6 cm)   Weight: (!) 136.7 kg (301 lb 5.9 oz)   IBW: 130 lbs +/-10%    ALLERGIES & MEDICATIONS: Reviewed and Reconciled  MEDICAL/SURGICAL & FAMILY HISTORY: Reviewed and Reconciled    LABORATORY:  Reviewed Diabetes Management Flowsheet and Results Review.    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 Patient is not limiting carbohydrates, saturated fat or sodium. Inadequate intakes of fruits, vegetables, whole grains.  Pt staying away from pork, rice and soft drinks. Limited amount of pasta. Eating more vegetables and salads.   Previously didn't eat 3 meals, sometimes only eating 1-2 times a day  B - 2 boiled eggs, 2 turkey sausage patties, water  S - none  L - granola bar  S - none  D - beef roast and cabbage, water  S - none  Beverages - previously drank juice and soft drinks, but now only drinking water  Dining out - Pt does not eat fast food.     PSYCHOSOCIAL: Stage of change - action  Barriers to change - none.    EDUCATIONAL ASSESSMENT:   1. Impediments in learning class environment - NONE.  2. Needs improvement in self care management skills.  -healthy eating  -being active  -self-monitoring  -taking medication  -problem solving  -healthy coping  -reducing risks    MNT ASSESSMENT:   9284-2377 calories, 110-120 grams protein daily  30-45 grams carb/meal, 15 grams carb/snack  increase fruit 2 serv/d, vegetables 2+ cups/d, whole grains 3+serv/d, lowfat dairy 3+serv/d  low-fat, " low-sodium  150 min physical activity per week, moderate intensity, as tolerated    PLAN:   Reviewed MNT guidelines for weight management with emphasis on portion control.    Encouraged daily self-monitoring of food and activity patterns, return records to clinic.   Provided meal-planning instruction via foodlists, plate method, food models, food labels and/or ADA booklet. Reviewed micro/macronutrient effect on health management. Discussed carbohydrate counting and spacing techniques with emphasis on cardiovascular wellness health strategies, functional foods. Reviewed principles of energy metabolism to assist weight and health management.    Discussed importance of daily physical activity with review of benefits, methods, guidelines and precautions.   Discussed behavioral strategies for improving social and environmental support of lifestyle changes.    GOALS: Self-monitoring: Daily food & activity journal. Meal Plan: 90% Accuracy. Activity:150 min/wk.    F/u in 4 wks  Visit Time Spent:  60 minutes  Thank you for the opportunity to work with your patient.

## 2018-12-22 NOTE — PROCEDURES
Home Sleep Studies  Date/Time: 12/21/2018 6:32 PM  Performed by: Dave Martinez MD  Authorized by: Antonio Tao MD       PHYSICIAN INTERPRETATION AND COMMENTS: Findings are consistent with PRIMARY SNORING. NON  DIAGNOSTIC for significant Obstructive sleep apnea. Consider repeat test of Attended/INLAB  POLYSOMNOGRAPHY.AHI 2.0/hr  CLINICAL HISTORY: MRN: [id number]: 33 year old female presented with: Has snoring sometimes morning headaches.  [place] Sleepiness Scale score today of 2. STOPBAND score ;Score of 3. Comorbidity: Insulin resistance and Hypertension.  Mallampati 3 BMI 49.71 kg/m² . 14.5 inch neck.  SLEEP STUDY FINDINGS: Patient underwent a one night Home Sleep Test and by behavioral criteria, slept for  approximately 6 hours, with a sleep latency of 11 minutes and a sleep efficiency of 84.8%. The patient slept supine 30.3% of  the night based on valid recording time of 5.97 hours. AHI 2.0/hr .When considering more subtle measures of sleep disordered  breathing, the overall respiratory disturbance index is mild (RDI=11) based on a 1% hypopnea desaturation criteria with  confirmation by surrogate arousal indicators. The apneas/hypopneas are accompanied by minimal oxygen desaturation (percent  time below 90% SpO2: 0.0%, Min SpO2: 91.1%). The average desaturation across all sleep disordered breathing events is  2.4%. Snoring occurs for 35.2% (30 dB) of the study, 9.5% is very loud. The mean pulse rate is 68 BPM, with frequent pulse  rate variability (42 events with >= 6 BPM increase/decrease per hour).  TREATMENT CONSIDERATIONS: Recommend repeat testing. For Snoring consider: Mandibular advancement splint  (MAS), referral to an ENT for surgical modifications to the airway, and/or weight loss or behavioral therapy to reduce the  potential risk of daytime somnolence, hypertension, cardiovascular disease, stroke and diabetes. A Mandibular Advancement  Splint (MAS) will likely provide treatment benefit  independent of TAWNYA severity. The patient should avoid sleeping supine given  non-supine AHI is in the normal range.  DISEASE MANAGEMENT CONSIDERATIONS: Morning headaches are symptoms that can be associated with  untreated TAWNYA.

## 2018-12-22 NOTE — PROGRESS NOTES
PHYSICIAN INTERPRETATION AND COMMENTS: Findings are consistent with PRIMARY SNORING. NON  DIAGNOSTIC for significant Obstructive sleep apnea. Consider repeat test of Attended/INLAB  POLYSOMNOGRAPHY.AHI 2.0/hr  CLINICAL HISTORY: MRN: [id number]: 33 year old female presented with: Has snoring sometimes morning headaches.  [place] Sleepiness Scale score today of 2. STOPBAND score ;Score of 3. Comorbidity: Insulin resistance and Hypertension.  Mallampati 3 BMI 49.71 kg/m² . 14.5 inch neck.  SLEEP STUDY FINDINGS: Patient underwent a one night Home Sleep Test and by behavioral criteria, slept for  approximately 6 hours, with a sleep latency of 11 minutes and a sleep efficiency of 84.8%. The patient slept supine 30.3% of  the night based on valid recording time of 5.97 hours. AHI 2.0/hr .When considering more subtle measures of sleep disordered  breathing, the overall respiratory disturbance index is mild (RDI=11) based on a 1% hypopnea desaturation criteria with  confirmation by surrogate arousal indicators. The apneas/hypopneas are accompanied by minimal oxygen desaturation (percent  time below 90% SpO2: 0.0%, Min SpO2: 91.1%). The average desaturation across all sleep disordered breathing events is  2.4%. Snoring occurs for 35.2% (30 dB) of the study, 9.5% is very loud. The mean pulse rate is 68 BPM, with frequent pulse  rate variability (42 events with >= 6 BPM increase/decrease per hour).  TREATMENT CONSIDERATIONS: Recommend repeat testing. For Snoring consider: Mandibular advancement splint  (MAS), referral to an ENT for surgical modifications to the airway, and/or weight loss or behavioral therapy to reduce the  potential risk of daytime somnolence, hypertension, cardiovascular disease, stroke and diabetes. A Mandibular Advancement  Splint (MAS) will likely provide treatment benefit independent of TAWNYA severity. The patient should avoid sleeping supine given  non-supine AHI is in the normal range.  DISEASE MANAGEMENT  CONSIDERATIONS: Morning headaches are symptoms that can be associated with  untreated TAWNYA.

## 2018-12-27 ENCOUNTER — TELEPHONE (OUTPATIENT)
Dept: FAMILY MEDICINE | Facility: CLINIC | Age: 33
End: 2018-12-27

## 2018-12-27 NOTE — TELEPHONE ENCOUNTER
----- Message from Heena Sen sent at 12/27/2018  4:05 PM CST -----  Contact: self   Returning call regarding results.please call back at [phone].    Thanks,  Heena Sen

## 2018-12-27 NOTE — TELEPHONE ENCOUNTER
----- Message from Lisa Parrish sent at 12/27/2018  1:39 PM CST -----  Patient needs call back to get test results..[phone] (home)      Per Dr Benson will make an appt for a re-teach putting the contact lens on / since he can remove.

## 2019-02-20 ENCOUNTER — OFFICE VISIT (OUTPATIENT)
Dept: FAMILY MEDICINE | Facility: CLINIC | Age: 34
End: 2019-02-20
Payer: COMMERCIAL

## 2019-02-20 VITALS
HEART RATE: 92 BPM | SYSTOLIC BLOOD PRESSURE: 138 MMHG | RESPIRATION RATE: 18 BRPM | OXYGEN SATURATION: 100 % | HEIGHT: 66 IN | BODY MASS INDEX: 47.09 KG/M2 | DIASTOLIC BLOOD PRESSURE: 82 MMHG | TEMPERATURE: 99 F | WEIGHT: 293 LBS

## 2019-02-20 DIAGNOSIS — E55.9 VITAMIN D DEFICIENCY DISEASE: ICD-10-CM

## 2019-02-20 DIAGNOSIS — M54.42 ACUTE BILATERAL LOW BACK PAIN WITH LEFT-SIDED SCIATICA: Primary | ICD-10-CM

## 2019-02-20 PROCEDURE — 99214 OFFICE O/P EST MOD 30 MIN: CPT | Mod: S$GLB,,, | Performed by: REGISTERED NURSE

## 2019-02-20 PROCEDURE — 99999 PR PBB SHADOW E&M-EST. PATIENT-LVL III: CPT | Mod: PBBFAC,,, | Performed by: REGISTERED NURSE

## 2019-02-20 PROCEDURE — 99214 PR OFFICE/OUTPT VISIT, EST, LEVL IV, 30-39 MIN: ICD-10-PCS | Mod: S$GLB,,, | Performed by: REGISTERED NURSE

## 2019-02-20 PROCEDURE — 3008F BODY MASS INDEX DOCD: CPT | Mod: CPTII,S$GLB,, | Performed by: REGISTERED NURSE

## 2019-02-20 PROCEDURE — 3008F PR BODY MASS INDEX (BMI) DOCUMENTED: ICD-10-PCS | Mod: CPTII,S$GLB,, | Performed by: REGISTERED NURSE

## 2019-02-20 PROCEDURE — 99999 PR PBB SHADOW E&M-EST. PATIENT-LVL III: ICD-10-PCS | Mod: PBBFAC,,, | Performed by: REGISTERED NURSE

## 2019-02-20 RX ORDER — SULINDAC 200 MG/1
200 TABLET ORAL 2 TIMES DAILY PRN
Qty: 60 TABLET | Refills: 0 | Status: SHIPPED | OUTPATIENT
Start: 2019-02-20 | End: 2019-03-07

## 2019-02-20 RX ORDER — PREDNISONE 20 MG/1
TABLET ORAL
Qty: 10 TABLET | Refills: 0 | Status: SHIPPED | OUTPATIENT
Start: 2019-02-20 | End: 2019-03-07

## 2019-02-20 RX ORDER — ASPIRIN 325 MG
50000 TABLET, DELAYED RELEASE (ENTERIC COATED) ORAL WEEKLY
Qty: 4 CAPSULE | Refills: 6 | Status: SHIPPED | OUTPATIENT
Start: 2019-02-20 | End: 2020-02-17

## 2019-02-20 RX ORDER — METHOCARBAMOL 750 MG/1
750 TABLET, FILM COATED ORAL 2 TIMES DAILY PRN
Qty: 60 TABLET | Refills: 0 | Status: SHIPPED | OUTPATIENT
Start: 2019-02-20 | End: 2019-03-07
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
## 2019-03-07 ENCOUNTER — OFFICE VISIT (OUTPATIENT)
Dept: FAMILY MEDICINE | Facility: CLINIC | Age: 34
End: 2019-03-07
Payer: COMMERCIAL

## 2019-03-07 VITALS
DIASTOLIC BLOOD PRESSURE: 70 MMHG | OXYGEN SATURATION: 98 % | SYSTOLIC BLOOD PRESSURE: 140 MMHG | HEART RATE: 102 BPM | BODY MASS INDEX: 47.09 KG/M2 | TEMPERATURE: 98 F | HEIGHT: 66 IN | WEIGHT: 293 LBS

## 2019-03-07 DIAGNOSIS — J06.9 ACUTE URI: Primary | ICD-10-CM

## 2019-03-07 PROCEDURE — 99999 PR PBB SHADOW E&M-EST. PATIENT-LVL III: CPT | Mod: PBBFAC,,, | Performed by: REGISTERED NURSE

## 2019-03-07 PROCEDURE — 3008F BODY MASS INDEX DOCD: CPT | Mod: CPTII,S$GLB,, | Performed by: REGISTERED NURSE

## 2019-03-07 PROCEDURE — 3008F PR BODY MASS INDEX (BMI) DOCUMENTED: ICD-10-PCS | Mod: CPTII,S$GLB,, | Performed by: REGISTERED NURSE

## 2019-03-07 PROCEDURE — 99214 OFFICE O/P EST MOD 30 MIN: CPT | Mod: 25,S$GLB,, | Performed by: REGISTERED NURSE

## 2019-03-07 PROCEDURE — 96372 PR INJECTION,THERAP/PROPH/DIAG2ST, IM OR SUBCUT: ICD-10-PCS | Mod: S$GLB,,, | Performed by: REGISTERED NURSE

## 2019-03-07 PROCEDURE — 99214 PR OFFICE/OUTPT VISIT, EST, LEVL IV, 30-39 MIN: ICD-10-PCS | Mod: 25,S$GLB,, | Performed by: REGISTERED NURSE

## 2019-03-07 PROCEDURE — 96372 THER/PROPH/DIAG INJ SC/IM: CPT | Mod: S$GLB,,, | Performed by: REGISTERED NURSE

## 2019-03-07 PROCEDURE — 99999 PR PBB SHADOW E&M-EST. PATIENT-LVL III: ICD-10-PCS | Mod: PBBFAC,,, | Performed by: REGISTERED NURSE

## 2019-03-07 RX ORDER — BETAMETHASONE SODIUM PHOSPHATE AND BETAMETHASONE ACETATE 3; 3 MG/ML; MG/ML
12 INJECTION, SUSPENSION INTRA-ARTICULAR; INTRALESIONAL; INTRAMUSCULAR; SOFT TISSUE
Status: COMPLETED | OUTPATIENT
Start: 2019-03-07 | End: 2019-03-07

## 2019-03-07 RX ADMIN — BETAMETHASONE SODIUM PHOSPHATE AND BETAMETHASONE ACETATE 12 MG: 3; 3 INJECTION, SUSPENSION INTRA-ARTICULAR; INTRALESIONAL; INTRAMUSCULAR; SOFT TISSUE at 04:03

## 2019-03-17 NOTE — PROGRESS NOTES
Subjective:       Patient ID: Marco Antonio Cisse is a 33 y.o. female.    Chief Complaint   Patient presents with    Hospital Follow Up     1-2 days ago       HPI    Marco Antonio Cisse is here today for ED follow-up.  Seen at [facility] ED yesterday.  Per ED note dated 3/6/2019 --- Patient is a 33-year-old female with a past medical history of asthma and migraine who presents with sudden onset of sore throat while sleeping. Patient states she feels that her throat is dry and has been drinking a lot of water without improvement of her symptoms. Patient is concerned she might be having an allergic reaction. Patient denies any difficulty breathing, voice change, rash, nausea, vomiting, fever, headache, neck pain, back pain, abdominal pain, hematuria, dysuria, or change in bowel movements.  ED Course as of Mar 06 0408 Patient well-appearing on exam. Patient tolerating secretions and has nonlabored respirations. Patient has no tenderness to palpation of neck. [MF] 0520 Patient symptoms improved with Benadryl and prednisone. Will discharge patient home with follow-up with primary care provider. [MF]     She reports thinking that it had been something she ate causing her throat issue, but food/fluids able to go down easily.  With c/o RN and dry cough, NC and sore throat.  Has not been on any medication at home for symptoms.  Does feel better after ED medication given.      Review of Systems   Constitutional: Negative for chills and fever.   HENT: Positive for congestion, rhinorrhea and sore throat. Negative for ear pain, postnasal drip, sinus pain, tinnitus, trouble swallowing and voice change.    Eyes: Negative.    Respiratory: Negative.    Cardiovascular: Negative.    Neurological: Negative.        Review of patient's allergies indicates:  No Known Allergies    Patient Active Problem List   Diagnosis    Migraine headache    Hypermenorrhea    Vitamin D deficiency disease    Insulin resistance    Irritable  "bowel syndrome (IBS)    Fluid retention       Current Outpatient Medications on File Prior to Visit   Medication Sig Dispense Refill    albuterol (PROVENTIL/VENTOLIN HFA) 90 mcg/actuation inhaler Inhale 2 puffs into the lungs every 4 to 6 hours as needed for Wheezing. 18 g 3    butalbital-acetaminophen-caffeine -40 mg (FIORICET, ESGIC) -40 mg per tablet Take 1 tablet by mouth every 4 to 6 hours as needed for Headaches. 30 tablet 0    cholecalciferol, vitamin D3, 50,000 unit capsule Take 1 capsule (50,000 Units total) by mouth once a week. 4 capsule 6     No current facility-administered medications on file prior to visit.        Past medical, surgical, family and social histories have been reviewed today.        Objective:     Vitals:    03/07/19 1608   BP: (!) 140/70   Pulse: 102   Temp: 98.3 °F (36.8 °C)   TempSrc: Oral   SpO2: 98%   Weight: (!) 136.1 kg (300 lb 0.7 oz)   Height: 5' 6" (1.676 m)         Physical Exam   Constitutional: She is oriented to person, place, and time. She appears well-developed and well-nourished.   HENT:   Head: Normocephalic and atraumatic.   Right Ear: Tympanic membrane normal.   Left Ear: Tympanic membrane normal.   Nose: Mucosal edema present. No rhinorrhea, sinus tenderness, nasal deformity or septal deviation. No epistaxis. Right sinus exhibits no maxillary sinus tenderness and no frontal sinus tenderness. Left sinus exhibits no maxillary sinus tenderness and no frontal sinus tenderness.   Mouth/Throat: Mucous membranes are normal. No oropharyngeal exudate, posterior oropharyngeal edema or posterior oropharyngeal erythema.   Eyes: EOM are normal. Pupils are equal, round, and reactive to light.   Cardiovascular: Normal rate and regular rhythm.   Pulmonary/Chest: Effort normal and breath sounds normal.   Lymphadenopathy:     She has no cervical adenopathy.   Neurological: She is alert and oriented to person, place, and time.   Vitals reviewed.        Diagnosis "       1. Acute URI          Assessment/ Plan     Acute URI  -     betamethasone acetate-betamethasone sodium phosphate injection 12 mg        Injection today.  Aftrin x 3 days only.  OTC cold/sinus medication as discussed.  Symptomatic care, rest and fluids.  Follow-up in clinic as needed.        ADALBERTO Chavez  [facility]

## 2019-04-16 ENCOUNTER — OFFICE VISIT (OUTPATIENT)
Dept: FAMILY MEDICINE | Facility: CLINIC | Age: 34
End: 2019-04-16
Payer: COMMERCIAL

## 2019-04-16 VITALS
HEART RATE: 85 BPM | OXYGEN SATURATION: 98 % | HEIGHT: 66 IN | DIASTOLIC BLOOD PRESSURE: 86 MMHG | TEMPERATURE: 98 F | BODY MASS INDEX: 47.09 KG/M2 | WEIGHT: 293 LBS | SYSTOLIC BLOOD PRESSURE: 142 MMHG

## 2019-04-16 DIAGNOSIS — J30.9 ALLERGIC RHINITIS, UNSPECIFIED SEASONALITY, UNSPECIFIED TRIGGER: Primary | ICD-10-CM

## 2019-04-16 DIAGNOSIS — R68.89 THROAT SYMPTOM: ICD-10-CM

## 2019-04-16 PROCEDURE — 99999 PR PBB SHADOW E&M-EST. PATIENT-LVL III: ICD-10-PCS | Mod: PBBFAC,,, | Performed by: REGISTERED NURSE

## 2019-04-16 PROCEDURE — 3008F BODY MASS INDEX DOCD: CPT | Mod: CPTII,S$GLB,, | Performed by: REGISTERED NURSE

## 2019-04-16 PROCEDURE — 99214 PR OFFICE/OUTPT VISIT, EST, LEVL IV, 30-39 MIN: ICD-10-PCS | Mod: S$GLB,,, | Performed by: REGISTERED NURSE

## 2019-04-16 PROCEDURE — 3008F PR BODY MASS INDEX (BMI) DOCUMENTED: ICD-10-PCS | Mod: CPTII,S$GLB,, | Performed by: REGISTERED NURSE

## 2019-04-16 PROCEDURE — 99999 PR PBB SHADOW E&M-EST. PATIENT-LVL III: CPT | Mod: PBBFAC,,, | Performed by: REGISTERED NURSE

## 2019-04-16 PROCEDURE — 99214 OFFICE O/P EST MOD 30 MIN: CPT | Mod: S$GLB,,, | Performed by: REGISTERED NURSE

## 2019-04-16 RX ORDER — MOMETASONE FUROATE 50 UG/1
2 SPRAY, METERED NASAL DAILY
Qty: 17 G | Refills: 3 | Status: SHIPPED | OUTPATIENT
Start: 2019-04-16 | End: 2020-02-17

## 2019-04-16 RX ORDER — MONTELUKAST SODIUM 10 MG/1
10 TABLET ORAL NIGHTLY
Qty: 30 TABLET | Refills: 6 | Status: SHIPPED | OUTPATIENT
Start: 2019-04-16 | End: 2019-05-16

## 2019-04-16 NOTE — PROGRESS NOTES
Subjective:       Patient ID: Marco Antonio Cisse is a 33 y.o. female.    Chief Complaint   Patient presents with    Sore Throat       Sore Throat    This is a recurrent problem. The current episode started yesterday. Progression since onset: improved. There has been no fever. Associated symptoms include headaches and trouble swallowing. Pertinent negatives include no abdominal pain, congestion, coughing, drooling, ear pain, plugged ear sensation, shortness of breath or swollen glands. Associated symptoms comments: Occurs primarily when laying down at night.  With c/o soreness and dried out, hard to swallow.. She has had no exposure to strep or mono. She has tried cool liquids (Benedryl) for the symptoms. The treatment provided moderate relief.       Marco Antonio Cisse is here today with c/o      Review of Systems   Constitutional: Negative for chills and fever.   HENT: Positive for sore throat and trouble swallowing. Negative for congestion, drooling, ear pain and sinus pain.    Eyes: Negative.    Respiratory: Negative for cough, shortness of breath and wheezing.    Cardiovascular: Negative.    Gastrointestinal: Negative for abdominal pain.   Neurological: Positive for headaches. Negative for weakness, light-headedness and numbness.       Review of patient's allergies indicates:  No Known Allergies    Patient Active Problem List   Diagnosis    Migraine headache    Hypermenorrhea    Vitamin D deficiency disease    Insulin resistance    Irritable bowel syndrome (IBS)    Fluid retention       Current Outpatient Medications on File Prior to Visit   Medication Sig Dispense Refill    albuterol (PROVENTIL/VENTOLIN HFA) 90 mcg/actuation inhaler Inhale 2 puffs into the lungs every 4 to 6 hours as needed for Wheezing. 18 g 3    butalbital-acetaminophen-caffeine -40 mg (FIORICET, ESGIC) -40 mg per tablet Take 1 tablet by mouth every 4 to 6 hours as needed for Headaches. 30 tablet 0     "cholecalciferol, vitamin D3, 50,000 unit capsule Take 1 capsule (50,000 Units total) by mouth once a week. 4 capsule 6     No current facility-administered medications on file prior to visit.        Past medical, surgical, family and social histories have been reviewed today.        Objective:     Vitals:    04/16/19 1313   BP: (!) 142/86   Pulse: 85   Temp: 98.2 °F (36.8 °C)   TempSrc: Oral   SpO2: 98%   Weight: (!) 137.1 kg (302 lb 4 oz)   Height: 5' 6" (1.676 m)         Physical Exam   Constitutional: She is oriented to person, place, and time. She appears well-developed and well-nourished. No distress.   HENT:   Head: Normocephalic and atraumatic.   Right Ear: Tympanic membrane normal.   Left Ear: Tympanic membrane normal.   Nose: Mucosal edema (boggy) present. No rhinorrhea.   Mouth/Throat: No oropharyngeal exudate, posterior oropharyngeal edema or posterior oropharyngeal erythema. Tonsils are 1+ on the right. Tonsils are 1+ on the left. No tonsillar exudate.   Eyes: Pupils are equal, round, and reactive to light.   Cardiovascular: Normal rate and regular rhythm.   Pulmonary/Chest: Effort normal and breath sounds normal. No stridor.   Lymphadenopathy:     She has no cervical adenopathy.   Neurological: She is alert and oriented to person, place, and time.   Skin: She is not diaphoretic.   Vitals reviewed.        Diagnosis       1. Allergic rhinitis, unspecified seasonality, unspecified trigger    2. Throat symptom          Assessment/ Plan     Allergic rhinitis, unspecified seasonality, unspecified trigger  -     Ambulatory referral to ENT  -     montelukast (SINGULAIR) 10 mg tablet; Take 1 tablet (10 mg total) by mouth every evening.  Dispense: 30 tablet; Refill: 6  -     mometasone (NASONEX) 50 mcg/actuation nasal spray; 2 sprays by Nasal route once daily.  Dispense: 17 g; Refill: 3    Throat symptom  -     Ambulatory referral to ENT  -     montelukast (SINGULAIR) 10 mg tablet; Take 1 tablet (10 mg total) by " mouth every evening.  Dispense: 30 tablet; Refill: 6  -     mometasone (NASONEX) 50 mcg/actuation nasal spray; 2 sprays by Nasal route once daily.  Dispense: 17 g; Refill: 3        To ENT for throat evaluation.  Medication discussed, as directed.  Throat symptoms may be due to PM nasal congestion and mouth breathing.  Vaseline in nares, appropriate bedtime temp, humidifier.  Follow-up in clinic as needed.      ADALBERTO Chavez  [facility]

## 2019-04-23 ENCOUNTER — OFFICE VISIT (OUTPATIENT)
Dept: OTOLARYNGOLOGY | Facility: CLINIC | Age: 34
End: 2019-04-23
Payer: COMMERCIAL

## 2019-04-23 VITALS — WEIGHT: 293 LBS | TEMPERATURE: 98 F | BODY MASS INDEX: 47.09 KG/M2 | HEIGHT: 66 IN

## 2019-04-23 DIAGNOSIS — J30.2 SEASONAL ALLERGIC RHINITIS, UNSPECIFIED TRIGGER: ICD-10-CM

## 2019-04-23 DIAGNOSIS — K21.9 LARYNGOPHARYNGEAL REFLUX (LPR): Primary | ICD-10-CM

## 2019-04-23 PROCEDURE — 99204 OFFICE O/P NEW MOD 45 MIN: CPT | Mod: 25,S$GLB,, | Performed by: PHYSICIAN ASSISTANT

## 2019-04-23 PROCEDURE — 31575 DIAGNOSTIC LARYNGOSCOPY: CPT | Mod: S$GLB,,, | Performed by: PHYSICIAN ASSISTANT

## 2019-04-23 PROCEDURE — 99204 PR OFFICE/OUTPT VISIT, NEW, LEVL IV, 45-59 MIN: ICD-10-PCS | Mod: 25,S$GLB,, | Performed by: PHYSICIAN ASSISTANT

## 2019-04-23 PROCEDURE — 31575 PR LARYNGOSCOPY, FLEXIBLE; DIAGNOSTIC: ICD-10-PCS | Mod: S$GLB,,, | Performed by: PHYSICIAN ASSISTANT

## 2019-04-23 PROCEDURE — 99999 PR PBB SHADOW E&M-EST. PATIENT-LVL III: CPT | Mod: PBBFAC,,, | Performed by: PHYSICIAN ASSISTANT

## 2019-04-23 PROCEDURE — 99999 PR PBB SHADOW E&M-EST. PATIENT-LVL III: ICD-10-PCS | Mod: PBBFAC,,, | Performed by: PHYSICIAN ASSISTANT

## 2019-04-23 RX ORDER — LANSOPRAZOLE 30 MG/1
30 TABLET, ORALLY DISINTEGRATING, DELAYED RELEASE ORAL DAILY
Qty: 30 TABLET | Refills: 11 | Status: SHIPPED | OUTPATIENT
Start: 2019-04-23 | End: 2020-07-01 | Stop reason: SDUPTHER

## 2019-04-23 RX ORDER — LEVOCETIRIZINE DIHYDROCHLORIDE 5 MG/1
5 TABLET, FILM COATED ORAL NIGHTLY
Qty: 30 TABLET | Refills: 11 | Status: SHIPPED | OUTPATIENT
Start: 2019-04-23 | End: 2021-06-15

## 2019-04-23 NOTE — PROGRESS NOTES
Referring Provider:    No referring provider defined for this encounter.  Subjective:   Patient: Marco Antonio Cisse [id number], :1985   Visit date:2019 11:44 AM    Chief Complaint:  Sore Throat (off and on for six months)    HPI:  Marco Antonio is a 33 y.o. female who I was asked to see in consultation for evaluation of chronic throat discomfort:      Severity: moderate  Quality: dull  Voice Quality: clear  Duration: 1 month(s) - about 5 episodes of feeling like her throat is closing up on her  Modifying factors:  None  Associated signs and symptoms:  Dysphagia  Post nasal drip or significant rhinorrhea:  No  Bad taste in the back of the mouth: No  Heartburn: No   Number of cups of caffeinated beverages daily: 0  Number of alcoholic beverages daily: 0  Smoking: No  Prior medical therapy:    - None - which has been ineffective.    No hx of asthma  No cough  No hx of GERD  No hx HTN  No thyroid issues  No voice changes  Occasional seasonal allergies    Review of Systems:  Negative unless checked off.  Gen:  []fever   []fatigue  HENT:  []nosebleeds  []dental problem   Eyes:  []photophobia  []visual disturbance  Resp:  []chest tightness []wheezing  Card:  []chest pain  []leg swelling  GI:  []abdominal pain []blood in stool  :  []dysuria  []hematuria  Musc:  []joint swelling  []gait problem  Skin:  []color change  []pallor  Neuro:  []seizures  []numbness  Hem:  []bruise/bleed easily  Psych:  []hallucinations  []behavioral problems  Allergy/Imm: has No Known Allergies.    Her meds, allergies, medical, surgical, social & family histories were reviewed & updated:  -     She has a current medication list which includes the following prescription(s): albuterol, butalbital-acetaminophen-caffeine -40 mg, cholecalciferol (vitamin d3), mometasone, montelukast, lansoprazole, and levocetirizine.  -     She  has a past medical history of IBS (irritable bowel syndrome), Insulin resistance syndrome, Migraines, and  "Vitamin D deficiency disease.   -     She does not have any pertinent problems on file.   -     She  has a past surgical history that includes Keloid excision; Tubal ligation; Colonoscopy (N/A, 10/7/2015); and Shoulder arthroscopy.  -     She  reports that she has never smoked. She has never used smokeless tobacco. She reports that she does not drink alcohol or use drugs.  -     Her family history includes Arthritis in her maternal aunt and mother; Colon polyps in her father; Diabetes in her father; Hypertension in her father and mother; Lupus in her cousin; Stroke in her father.  -     She has No Known Allergies.    Objective:   Physical Exam:  Vitals:  Temp 98.1 °F (36.7 °C) (Tympanic)   Ht 5' 6" (1.676 m)   Wt 135.2 kg (298 lb 1 oz)   BMI 48.11 kg/m²   General appearance:  Well developed, well nourished    Eyes:  Extraocular motions intact, PERRL    Communication:  no hoarseness, no dysphonia    Ears:  Otoscopy of external auditory canals and tympanic membranes was normal, clinical speech reception thresholds grossly intact, no mass/lesion of auricle.  Nose:  No masses/lesions of external nose, nasal mucosa, septum, and turbinates were within normal limits.  Mouth:  No mass/lesion of lips, teeth, gums, hard/soft palate, tongue, tonsils, or oropharynx.    Cardiovascular:  No pedal edema; Radial Pulses +2     Neck & Lymphatics:  No cervical lymphadenopathy, no neck mass/crepitus/ asymmetry, trachea is midline, no thyroid enlargement/tenderness/mass.    Psych: Oriented x3,  Alert with normal mood and affect.     Respiration/Chest:  Symmetric expansion during respiration, normal respiratory effort.    Skin:  Warm and intact. No ulcerations of face, scalp, neck.      Assessment & Plan:     -  Laryngopharyngeal reflux - Marco Antonio has laryngopharyngeal reflux (LPR).  We recommend and discussed with her anti-reflux measures such as raising the head of the bed, avoiding tight clothing or belts, avoiding eating within 3 " hours of laying down, and weight loss. Also she should avoid caffeine, peppermints, alcohol and tobacco. H2 blockers (ie. Pepcid) or antacids (ie. Tums) can help. However, for persisting chronic or daily symptoms, a trial of proton pump inhibitors is recommended. I recommend GI evaluation if symptoms are refractory to proton pump inhibitors or if there is dysphagia, weight loss or GI bleeding.    Laryngoscopic exam normal  Trial with Prevacid QD  Advised filling Singulair from her PCP, added Xyzal QHS  Recheck in 4-6 weeks    Mckayla Maher PA-C  -------------------------------------------------------------------------------------------------------------------    Endoscopic Exam:  Patient: Marco Antonio Cisse [id number], :1985  Procedure date:2019  Patient's medications, allergies, past medical, surgical, social and family histories were reviewed and updated as appropriate.  Chief Complaint:  Sore Throat (off and on for six months)    HPI:  Marco Antonio is a 33 y.o. female with the history of present illness as discussed in the clinic note from today.    Procedure: Risks, benefits, and alternatives of the procedure were discussed with the patient, and the patient consented to the nasal endoscopy.  The nasal cavity was sprayed with a topical decongestant and anesthetic (if needed). The endoscope was passed into each nostril and each nasal cavity was visualized.  On each side the nasal cavity, sinuses (if open), turbinates, and septum were examined with the findings described below. At the end of the examination, the scope was removed. The patient tolerated the procedure well with no complications.       Endoscopic Exam Findings:  -     The right side has normal mucosa  -     The left side has normal mucosa  -     Nasal secretions: No discolored secretions noted bilaterally  -     Nasal septum: no significant deviation or perforation appreciated   -     Inferior turbinate: Normal mucosa without  significant hypertrophy bilaterally  -     Middle turbinate: hypertrophy or edema (Mild) and Normal mucosa without significant hypertrophy bilaterally  -     Other findings: - no mass or obstructive lesion - and No mass or obstructive lesion      -     Laryngeal mucosa is normal  -     Posterior commissure has no  hypertrophy  -     Lingual tonsils have no  hypertrophy  -     Adenoids have no hypertrophy  -     Right vocal fold: normal mobility     mass/lesion: none  -     Left vocal fold: normal mobility     mass/lesion: none  -     Other findings: none    Assessment & Plan:  - see today's clinic note       Mckayla Maher PA-C

## 2019-06-10 ENCOUNTER — OFFICE VISIT (OUTPATIENT)
Dept: FAMILY MEDICINE | Facility: CLINIC | Age: 34
End: 2019-06-10
Payer: COMMERCIAL

## 2019-06-10 VITALS
TEMPERATURE: 98 F | BODY MASS INDEX: 47.09 KG/M2 | HEART RATE: 63 BPM | SYSTOLIC BLOOD PRESSURE: 130 MMHG | DIASTOLIC BLOOD PRESSURE: 92 MMHG | OXYGEN SATURATION: 97 % | WEIGHT: 293 LBS | HEIGHT: 66 IN

## 2019-06-10 DIAGNOSIS — M54.2 CHRONIC NECK PAIN: Primary | ICD-10-CM
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
## 2019-06-10 DIAGNOSIS — Z76.0 MEDICATION REFILL: ICD-10-CM

## 2019-06-10 DIAGNOSIS — G89.29 CHRONIC NECK PAIN: Primary | ICD-10-CM

## 2019-06-10 DIAGNOSIS — R51.9 HEADACHE, UNSPECIFIED HEADACHE TYPE: ICD-10-CM

## 2019-06-10 PROCEDURE — 3008F BODY MASS INDEX DOCD: CPT | Mod: CPTII,S$GLB,, | Performed by: REGISTERED NURSE

## 2019-06-10 PROCEDURE — 99214 OFFICE O/P EST MOD 30 MIN: CPT | Mod: S$GLB,,, | Performed by: REGISTERED NURSE

## 2019-06-10 PROCEDURE — 99214 PR OFFICE/OUTPT VISIT, EST, LEVL IV, 30-39 MIN: ICD-10-PCS | Mod: S$GLB,,, | Performed by: REGISTERED NURSE

## 2019-06-10 PROCEDURE — 99999 PR PBB SHADOW E&M-EST. PATIENT-LVL IV: CPT | Mod: PBBFAC,,, | Performed by: REGISTERED NURSE

## 2019-06-10 PROCEDURE — 3008F PR BODY MASS INDEX (BMI) DOCUMENTED: ICD-10-PCS | Mod: CPTII,S$GLB,, | Performed by: REGISTERED NURSE

## 2019-06-10 PROCEDURE — 99999 PR PBB SHADOW E&M-EST. PATIENT-LVL IV: ICD-10-PCS | Mod: PBBFAC,,, | Performed by: REGISTERED NURSE

## 2019-06-10 RX ORDER — BUTALBITAL, ACETAMINOPHEN AND CAFFEINE 50; 325; 40 MG/1; MG/1; MG/1
1 TABLET ORAL
Qty: 30 TABLET | Refills: 0 | Status: SHIPPED | OUTPATIENT
Start: 2019-06-10 | End: 2019-12-30 | Stop reason: SDUPTHER

## 2019-06-10 RX ORDER — ALBUTEROL SULFATE 90 UG/1
2 AEROSOL, METERED RESPIRATORY (INHALATION)
Qty: 18 G | Refills: 3 | Status: SHIPPED | OUTPATIENT
Start: 2019-06-10 | End: 2020-02-17

## 2019-06-10 RX ORDER — METHOCARBAMOL 750 MG/1
750 TABLET, FILM COATED ORAL 2 TIMES DAILY PRN
Qty: 60 TABLET | Refills: 0 | Status: SHIPPED | OUTPATIENT
Start: 2019-06-10 | End: 2020-02-17
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
## 2019-06-11 ENCOUNTER — TELEPHONE (OUTPATIENT)
Dept: FAMILY MEDICINE | Facility: CLINIC | Age: 34
End: 2019-06-11

## 2019-06-11 NOTE — TELEPHONE ENCOUNTER
----- Message from Lisa Parrish sent at 6/11/2019  2:59 PM CDT -----  Patient needs call back rg referral ..[phone]

## 2019-07-15 ENCOUNTER — OFFICE VISIT (OUTPATIENT)
Dept: FAMILY MEDICINE | Facility: CLINIC | Age: 34
End: 2019-07-15
Payer: COMMERCIAL

## 2019-07-15 VITALS
BODY MASS INDEX: 47.89 KG/M2 | RESPIRATION RATE: 18 BRPM | SYSTOLIC BLOOD PRESSURE: 118 MMHG | TEMPERATURE: 98 F | HEART RATE: 100 BPM | OXYGEN SATURATION: 99 % | DIASTOLIC BLOOD PRESSURE: 64 MMHG | WEIGHT: 293 LBS

## 2019-07-15 DIAGNOSIS — F41.0 PANIC ANXIETY SYNDROME: Primary | ICD-10-CM

## 2019-07-15 PROCEDURE — 99999 PR PBB SHADOW E&M-EST. PATIENT-LVL IV: ICD-10-PCS | Mod: PBBFAC,,, | Performed by: REGISTERED NURSE

## 2019-07-15 PROCEDURE — 3008F BODY MASS INDEX DOCD: CPT | Mod: CPTII,S$GLB,, | Performed by: REGISTERED NURSE

## 2019-07-15 PROCEDURE — 99999 PR PBB SHADOW E&M-EST. PATIENT-LVL IV: CPT | Mod: PBBFAC,,, | Performed by: REGISTERED NURSE

## 2019-07-15 PROCEDURE — 99214 OFFICE O/P EST MOD 30 MIN: CPT | Mod: S$GLB,,, | Performed by: REGISTERED NURSE

## 2019-07-15 PROCEDURE — 3008F PR BODY MASS INDEX (BMI) DOCUMENTED: ICD-10-PCS | Mod: CPTII,S$GLB,, | Performed by: REGISTERED NURSE

## 2019-07-15 PROCEDURE — 99214 PR OFFICE/OUTPT VISIT, EST, LEVL IV, 30-39 MIN: ICD-10-PCS | Mod: S$GLB,,, | Performed by: REGISTERED NURSE

## 2019-07-15 RX ORDER — CLONAZEPAM 0.25 MG/1
0.25 TABLET, ORALLY DISINTEGRATING ORAL DAILY PRN
Qty: 30 TABLET | Refills: 0 | Status: SHIPPED | OUTPATIENT
Start: 2019-07-15 | End: 2020-02-17
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
## 2019-07-16 PROBLEM — F41.0 PANIC ANXIETY SYNDROME: Status: ACTIVE | Noted: 2019-07-16

## 2019-09-10 ENCOUNTER — HOSPITAL ENCOUNTER (OUTPATIENT)
Dept: RADIOLOGY | Facility: HOSPITAL | Age: 34
Discharge: HOME | End: 2019-09-10
Attending: REGISTERED NURSE
Payer: COMMERCIAL

## 2019-09-10 ENCOUNTER — OFFICE VISIT (OUTPATIENT)
Dept: FAMILY MEDICINE | Facility: CLINIC | Age: 34
End: 2019-09-10
Payer: COMMERCIAL

## 2019-09-10 VITALS
BODY MASS INDEX: 47.05 KG/M2 | DIASTOLIC BLOOD PRESSURE: 70 MMHG | SYSTOLIC BLOOD PRESSURE: 118 MMHG | TEMPERATURE: 99 F | HEIGHT: 66 IN | HEART RATE: 80 BPM | WEIGHT: 292.75 LBS

## 2019-09-10 DIAGNOSIS — M67.432 GANGLION CYST OF DORSUM OF LEFT WRIST: ICD-10-CM

## 2019-09-10 DIAGNOSIS — J32.9 SINUSITIS, UNSPECIFIED CHRONICITY, UNSPECIFIED LOCATION: Primary | ICD-10-CM

## 2019-09-10 PROCEDURE — 73110 XR WRIST COMPLETE 3 VIEWS LEFT: ICD-10-PCS | Mod: 26,LT,, | Performed by: RADIOLOGY

## 2019-09-10 PROCEDURE — 3008F BODY MASS INDEX DOCD: CPT | Mod: CPTII,S$GLB,, | Performed by: REGISTERED NURSE

## 2019-09-10 PROCEDURE — 99214 PR OFFICE/OUTPT VISIT, EST, LEVL IV, 30-39 MIN: ICD-10-PCS | Mod: S$GLB,,, | Performed by: REGISTERED NURSE

## 2019-09-10 PROCEDURE — 73110 X-RAY EXAM OF WRIST: CPT | Mod: 26,LT,, | Performed by: RADIOLOGY

## 2019-09-10 PROCEDURE — 99999 PR PBB SHADOW E&M-EST. PATIENT-LVL III: CPT | Mod: PBBFAC,,, | Performed by: REGISTERED NURSE

## 2019-09-10 PROCEDURE — 99999 PR PBB SHADOW E&M-EST. PATIENT-LVL III: ICD-10-PCS | Mod: PBBFAC,,, | Performed by: REGISTERED NURSE

## 2019-09-10 PROCEDURE — 73110 X-RAY EXAM OF WRIST: CPT | Mod: TC,FY,PO,LT

## 2019-09-10 PROCEDURE — 99214 OFFICE O/P EST MOD 30 MIN: CPT | Mod: S$GLB,,, | Performed by: REGISTERED NURSE

## 2019-09-10 PROCEDURE — 3008F PR BODY MASS INDEX (BMI) DOCUMENTED: ICD-10-PCS | Mod: CPTII,S$GLB,, | Performed by: REGISTERED NURSE

## 2019-09-10 RX ORDER — PROMETHAZINE HYDROCHLORIDE AND DEXTROMETHORPHAN HYDROBROMIDE 6.25; 15 MG/5ML; MG/5ML
5 SYRUP ORAL
Qty: 118 ML | Refills: 0 | Status: SHIPPED | OUTPATIENT
Start: 2019-09-10 | End: 2020-02-17

## 2019-09-10 RX ORDER — AZITHROMYCIN 250 MG/1
TABLET, FILM COATED ORAL
Qty: 6 TABLET | Refills: 0 | Status: SHIPPED | OUTPATIENT
Start: 2019-09-10 | End: 2019-09-15

## 2019-09-10 NOTE — PROGRESS NOTES
Subjective:       Patient ID: Marco Antonio Cisse is a 34 y.o. female.    Chief Complaint   Patient presents with    Sinus Problem    Hand Pain       Sinus Problem   This is a new problem. The current episode started in the past 7 days. The problem has been rapidly worsening since onset. There has been no fever. Her pain is at a severity of 6/10. Associated symptoms include congestion, coughing (prod//green), headaches, sinus pressure (teeth/face), sneezing and a sore throat. Pertinent negatives include no chills, diaphoresis, ear pain or shortness of breath. Treatments tried: Nyquil, Xyzal, nasal spray. The treatment provided mild relief.   Hand Pain    The incident occurred more than 1 week ago. There was no injury mechanism. The pain is present in the left wrist. The pain does not radiate. The pain has been fluctuating since the incident. Pertinent negatives include no chest pain, numbness or tingling. She has tried heat for the symptoms. The treatment provided no relief.         Review of Systems   Constitutional: Positive for fatigue. Negative for chills, diaphoresis and fever.   HENT: Positive for congestion, postnasal drip, rhinorrhea, sinus pressure (teeth/face), sneezing and sore throat. Negative for ear pain, tinnitus, trouble swallowing and voice change.    Eyes: Negative.    Respiratory: Positive for cough (prod//green). Negative for shortness of breath and wheezing.    Cardiovascular: Negative for chest pain, palpitations and leg swelling.   Musculoskeletal: Positive for arthralgias and joint swelling.   Neurological: Positive for headaches. Negative for tingling, weakness, light-headedness and numbness.   Hematological: Negative for adenopathy.         Review of patient's allergies indicates:  No Known Allergies      Medication List with Changes/Refills   Current Medications    ALBUTEROL (PROVENTIL/VENTOLIN HFA) 90 MCG/ACTUATION INHALER    Inhale 2 puffs into the lungs every 4 to 6 hours as  "needed for Wheezing.    BUTALBITAL-ACETAMINOPHEN-CAFFEINE -40 MG (FIORICET, ESGIC) -40 MG PER TABLET    Take 1 tablet by mouth every 4 to 6 hours as needed for Headaches.    CHOLECALCIFEROL, VITAMIN D3, 50,000 UNIT CAPSULE    Take 1 capsule (50,000 Units total) by mouth once a week.    CLONAZEPAM (KLONOPIN) 0.25 MG TBDL    Take 1 tablet (0.25 mg total) by mouth daily as needed.    LANSOPRAZOLE (PREVACID SOLUTAB) 30 MG DISINTEGRATING TABLET    Take 1 tablet (30 mg total) by mouth once daily.    LEVOCETIRIZINE (XYZAL) 5 MG TABLET    Take 1 tablet (5 mg total) by mouth every evening.    METHOCARBAMOL (ROBAXIN) 750 MG TAB    Take 1 tablet (750 mg total) by mouth 2 (two) times daily as needed.    MOMETASONE (NASONEX) 50 MCG/ACTUATION NASAL SPRAY    2 sprays by Nasal route once daily.       Patient Active Problem List   Diagnosis    Migraine headache    Hypermenorrhea    Vitamin D deficiency disease    Insulin resistance    Irritable bowel syndrome (IBS)    Fluid retention    Panic anxiety syndrome         Past medical, surgical, family and social histories have been reviewed today.        Objective:     Vitals:    09/10/19 0825   BP: 118/70   Pulse: 80   Temp: 98.5 °F (36.9 °C)   Weight: 132.8 kg (292 lb 12.3 oz)   Height: 5' 6" (1.676 m)   PainSc:   6         Physical Exam   Constitutional: She is oriented to person, place, and time. She appears well-developed and well-nourished.   HENT:   Head: Normocephalic and atraumatic.   Right Ear: Tympanic membrane normal.   Left Ear: Tympanic membrane normal.   Nose: Mucosal edema and rhinorrhea present. No sinus tenderness. Right sinus exhibits maxillary sinus tenderness and frontal sinus tenderness. Left sinus exhibits maxillary sinus tenderness and frontal sinus tenderness.   Mouth/Throat: Oropharynx is clear and moist and mucous membranes are normal.   Eyes: Pupils are equal, round, and reactive to light. EOM are normal.   Cardiovascular: Normal rate and " regular rhythm.   Pulmonary/Chest: Effort normal and breath sounds normal.   Musculoskeletal:        Left wrist: She exhibits bony tenderness (ganglion cyst). She exhibits normal range of motion.        Arms:  Lymphadenopathy:     She has no cervical adenopathy.   Neurological: She is alert and oriented to person, place, and time. No sensory deficit. Coordination normal.   Skin: Skin is warm and dry. Capillary refill takes less than 2 seconds. No rash noted.   Psychiatric: She has a normal mood and affect. Her behavior is normal. Judgment and thought content normal.   Vitals reviewed.        Diagnosis       1. Sinusitis, unspecified chronicity, unspecified location    2. Ganglion cyst of dorsum of left wrist          Assessment/ Plan     Sinusitis, unspecified chronicity, unspecified location  -     azithromycin (Z-JC) 250 MG tablet; Take 2 tablets by mouth on day 1; Take 1 tablet by mouth on days 2-5  Dispense: 6 tablet; Refill: 0  -     promethazine-dextromethorphan (PROMETHAZINE-DM) 6.25-15 mg/5 mL Syrp; Take 5 mLs by mouth every 4 to 6 hours as needed.  Dispense: 118 mL; Refill: 0    Ganglion cyst of dorsum of left wrist  -     X-Ray Wrist Complete Left; Future; Expected date: 09/10/2019        Xray pending.  Symptomatic care, rest and fluids.  Follow-up in clinic as needed.        Future Appointments   Date Time Provider Department Center   9/10/2019  8:30 AM Randal Jain NP [facility]       Patient Care Team:  Randal Jain NP as PCP - General (General Practice)  Severo Gonzalez MD as Obstetrician (Obstetrics)  Destini Lawson RD as Dietitian (Endocrinology)      ADALBERTO Chavez  [facility]

## 2019-09-11 ENCOUNTER — TELEPHONE (OUTPATIENT)
Dept: FAMILY MEDICINE | Facility: CLINIC | Age: 34
End: 2019-09-11

## 2019-09-11 NOTE — TELEPHONE ENCOUNTER
----- Message from Quang Tovar sent at 9/11/2019  8:05 AM CDT -----  ..Type:  Patient Returning Call    Who Called:pt   Who Left Message for Patient  Does the patient know what this is regarding?: x ray   Would the patient rather a call back or a response via [employer]? Call back   Best Call Back Number:[phone]  Additional Information: pt is requesting a call from nurse to discuss result of x ray

## 2019-10-29 ENCOUNTER — TELEPHONE (OUTPATIENT)
Dept: FAMILY MEDICINE | Facility: CLINIC | Age: 34
End: 2019-10-29

## 2019-10-29 NOTE — TELEPHONE ENCOUNTER
----- Message from Gisselle Gudino sent at 10/29/2019 11:22 AM CDT -----  Contact: pt  The pt wants to know if a change can be made on her Anxiety medication, the pt wants to be advised and can be reached at [phone]///[id number]

## 2019-12-04 NOTE — PROGRESS NOTES
Subjective:       Patient ID: Marco Antonio Cisse is a 34 y.o. female.    Chief Complaint   Patient presents with    Headache       HPI    Marco Antonio Cisse is here today with c/o not feeling well for about the past 2 weeks.  Son has been ill at home.  Reports HA pain, sinus pain, heavy eyes with dizziness.  Cough has been productive.  No meds taken other than Fioricet prn HA pain.  No HA pain today.      Review of Systems   Constitutional: Positive for fatigue.   HENT: Positive for congestion, ear pain, postnasal drip and sinus pressure. Negative for rhinorrhea and sore throat.    Eyes: Positive for pain. Negative for photophobia, discharge, redness, itching and visual disturbance.   Respiratory: Positive for cough. Negative for chest tightness, shortness of breath, wheezing and stridor.    Gastrointestinal: Negative for nausea and vomiting.   Musculoskeletal: Negative for myalgias.   Neurological: Positive for light-headedness and headaches. Negative for tremors, seizures, syncope, weakness and numbness.   Hematological: Negative for adenopathy.         Review of patient's allergies indicates:  No Known Allergies      Medication List with Changes/Refills   Current Medications    ALBUTEROL (PROVENTIL/VENTOLIN HFA) 90 MCG/ACTUATION INHALER    Inhale 2 puffs into the lungs every 4 to 6 hours as needed for Wheezing.    BUTALBITAL-ACETAMINOPHEN-CAFFEINE -40 MG (FIORICET, ESGIC) -40 MG PER TABLET    Take 1 tablet by mouth every 4 to 6 hours as needed for Headaches.    CHOLECALCIFEROL, VITAMIN D3, 50,000 UNIT CAPSULE    Take 1 capsule (50,000 Units total) by mouth once a week.    CLONAZEPAM (KLONOPIN) 0.25 MG TBDL    Take 1 tablet (0.25 mg total) by mouth daily as needed.    LANSOPRAZOLE (PREVACID SOLUTAB) 30 MG DISINTEGRATING TABLET    Take 1 tablet (30 mg total) by mouth once daily.    LEVOCETIRIZINE (XYZAL) 5 MG TABLET    Take 1 tablet (5 mg total) by mouth every evening.    METHOCARBAMOL  "(ROBAXIN) 750 MG TAB    Take 1 tablet (750 mg total) by mouth 2 (two) times daily as needed.    MOMETASONE (NASONEX) 50 MCG/ACTUATION NASAL SPRAY    2 sprays by Nasal route once daily.    PROMETHAZINE-DEXTROMETHORPHAN (PROMETHAZINE-DM) 6.25-15 MG/5 ML SYRP    Take 5 mLs by mouth every 4 to 6 hours as needed.       Patient Active Problem List   Diagnosis    Migraine headache    Hypermenorrhea    Vitamin D deficiency disease    Insulin resistance    Irritable bowel syndrome (IBS)    Fluid retention    Panic anxiety syndrome         Past medical, surgical, family and social histories have been reviewed today.        Objective:     Vitals:    12/06/19 0847   BP: 127/81   Pulse: 81   Temp: 98.3 °F (36.8 °C)   Weight: 135.4 kg (298 lb 8.1 oz)   Height: 5' 6" (1.676 m)   PainSc: 0-No pain         Physical Exam   Constitutional: She is oriented to person, place, and time. She appears well-developed and well-nourished.   HENT:   Head: Normocephalic and atraumatic.   Right Ear: There is drainage.   Left Ear: There is drainage.   Nose: Mucosal edema and rhinorrhea present. Right sinus exhibits frontal sinus tenderness. Left sinus exhibits frontal sinus tenderness.   Mouth/Throat: Oropharynx is clear and moist and mucous membranes are normal.   TM not visualized due to bilat wax plug.  She usually gets cleaned out per ENT.   Eyes: Pupils are equal, round, and reactive to light. Conjunctivae are normal. Right eye exhibits no discharge. Left eye exhibits no discharge.   Cardiovascular: Normal rate and regular rhythm.   Pulmonary/Chest: Effort normal and breath sounds normal.   Musculoskeletal: Normal range of motion. She exhibits no edema.   Lymphadenopathy:     She has no cervical adenopathy.   Neurological: She is alert and oriented to person, place, and time.   Skin: Skin is warm and dry. Capillary refill takes less than 2 seconds. No rash noted.   Psychiatric: She has a normal mood and affect. Her behavior is normal. " Judgment and thought content normal.   Vitals reviewed.        Diagnosis       1. Allergic rhinitis, unspecified seasonality, unspecified trigger    2. Headache, unspecified headache type    3. Bilateral impacted cerumen          Assessment/ Plan     Allergic rhinitis, unspecified seasonality, unspecified trigger  -     predniSONE (DELTASONE) 20 MG tablet; Take 2 tab daily x 3 days, 1 tab daily x 3 days, then 1/2 tab daily x 2 days.  Dispense: 10 tablet; Refill: 0  -     desloratadine (CLARINEX) 5 mg tablet; Take 1 tablet (5 mg total) by mouth once daily.  Dispense: 30 tablet; Refill: 6  -     triamcinolone (NASACORT) 55 mcg nasal inhaler; 2 sprays by Nasal route once daily.  Dispense: 17 g; Refill: 3    Headache, unspecified headache type  -     predniSONE (DELTASONE) 20 MG tablet; Take 2 tab daily x 3 days, 1 tab daily x 3 days, then 1/2 tab daily x 2 days.  Dispense: 10 tablet; Refill: 0  -     desloratadine (CLARINEX) 5 mg tablet; Take 1 tablet (5 mg total) by mouth once daily.  Dispense: 30 tablet; Refill: 6  -     triamcinolone (NASACORT) 55 mcg nasal inhaler; 2 sprays by Nasal route once daily.  Dispense: 17 g; Refill: 3    Bilateral impacted cerumen  -     Ambulatory referral to ENT        Medication discussed, as directed.  Continue Fioricet prn HA pain.  Referral to ENT for wax removal.  Symptomatic care, rest and fluids.  Follow-up in clinic as needed.          Patient Care Team:  Randal Jain NP as PCP - General (General Practice)  Severo Gonzalez MD as Obstetrician (Obstetrics)  Destini Lawson RD as Dietitian (Endocrinology)      ADALBERTO Chavez  [facility]

## 2019-12-06 ENCOUNTER — OFFICE VISIT (OUTPATIENT)
Dept: FAMILY MEDICINE | Facility: CLINIC | Age: 34
End: 2019-12-06
Payer: COMMERCIAL

## 2019-12-06 VITALS
SYSTOLIC BLOOD PRESSURE: 127 MMHG | TEMPERATURE: 98 F | WEIGHT: 293 LBS | HEART RATE: 81 BPM | BODY MASS INDEX: 47.09 KG/M2 | DIASTOLIC BLOOD PRESSURE: 81 MMHG | HEIGHT: 66 IN

## 2019-12-06 DIAGNOSIS — H61.23 BILATERAL IMPACTED CERUMEN: ICD-10-CM

## 2019-12-06 DIAGNOSIS — J30.9 ALLERGIC RHINITIS, UNSPECIFIED SEASONALITY, UNSPECIFIED TRIGGER: Primary | ICD-10-CM

## 2019-12-06 DIAGNOSIS — R51.9 HEADACHE, UNSPECIFIED HEADACHE TYPE: ICD-10-CM

## 2019-12-06 PROCEDURE — 99999 PR PBB SHADOW E&M-EST. PATIENT-LVL IV: CPT | Mod: PBBFAC,,, | Performed by: REGISTERED NURSE

## 2019-12-06 PROCEDURE — 99214 OFFICE O/P EST MOD 30 MIN: CPT | Mod: S$GLB,,, | Performed by: REGISTERED NURSE

## 2019-12-06 PROCEDURE — 99999 PR PBB SHADOW E&M-EST. PATIENT-LVL IV: ICD-10-PCS | Mod: PBBFAC,,, | Performed by: REGISTERED NURSE

## 2019-12-06 PROCEDURE — 3008F BODY MASS INDEX DOCD: CPT | Mod: CPTII,S$GLB,, | Performed by: REGISTERED NURSE

## 2019-12-06 PROCEDURE — 99214 PR OFFICE/OUTPT VISIT, EST, LEVL IV, 30-39 MIN: ICD-10-PCS | Mod: S$GLB,,, | Performed by: REGISTERED NURSE

## 2019-12-06 PROCEDURE — 3008F PR BODY MASS INDEX (BMI) DOCUMENTED: ICD-10-PCS | Mod: CPTII,S$GLB,, | Performed by: REGISTERED NURSE

## 2019-12-06 RX ORDER — PREDNISONE 20 MG/1
TABLET ORAL
Qty: 10 TABLET | Refills: 0 | Status: SHIPPED | OUTPATIENT
Start: 2019-12-06 | End: 2020-02-17

## 2019-12-06 RX ORDER — TRIAMCINOLONE ACETONIDE 55 UG/1
2 SPRAY, METERED NASAL DAILY
Qty: 17 G | Refills: 3 | Status: SHIPPED | OUTPATIENT
Start: 2019-12-06 | End: 2020-02-17

## 2019-12-06 RX ORDER — DESLORATADINE 5 MG/1
5 TABLET ORAL DAILY
Qty: 30 TABLET | Refills: 6 | Status: SHIPPED | OUTPATIENT
Start: 2019-12-06 | End: 2020-02-17

## 2019-12-10 ENCOUNTER — OFFICE VISIT (OUTPATIENT)
Dept: OTOLARYNGOLOGY | Facility: CLINIC | Age: 34
End: 2019-12-10
Payer: COMMERCIAL

## 2019-12-10 VITALS
BODY MASS INDEX: 47.09 KG/M2 | HEART RATE: 63 BPM | HEIGHT: 66 IN | WEIGHT: 293 LBS | DIASTOLIC BLOOD PRESSURE: 87 MMHG | TEMPERATURE: 98 F | SYSTOLIC BLOOD PRESSURE: 129 MMHG

## 2019-12-10 DIAGNOSIS — H61.23 BILATERAL IMPACTED CERUMEN: Primary | ICD-10-CM

## 2019-12-10 PROCEDURE — 92504 PR EAR MICROSCOPY EXAMINATION: ICD-10-PCS | Mod: S$GLB,,, | Performed by: PHYSICIAN ASSISTANT

## 2019-12-10 PROCEDURE — 3008F PR BODY MASS INDEX (BMI) DOCUMENTED: ICD-10-PCS | Mod: CPTII,S$GLB,, | Performed by: PHYSICIAN ASSISTANT

## 2019-12-10 PROCEDURE — 99213 PR OFFICE/OUTPT VISIT, EST, LEVL III, 20-29 MIN: ICD-10-PCS | Mod: 25,S$GLB,, | Performed by: PHYSICIAN ASSISTANT

## 2019-12-10 PROCEDURE — 3008F BODY MASS INDEX DOCD: CPT | Mod: CPTII,S$GLB,, | Performed by: PHYSICIAN ASSISTANT

## 2019-12-10 PROCEDURE — 92504 EAR MICROSCOPY EXAMINATION: CPT | Mod: S$GLB,,, | Performed by: PHYSICIAN ASSISTANT

## 2019-12-10 PROCEDURE — 99213 OFFICE O/P EST LOW 20 MIN: CPT | Mod: 25,S$GLB,, | Performed by: PHYSICIAN ASSISTANT

## 2019-12-10 PROCEDURE — 99999 PR PBB SHADOW E&M-EST. PATIENT-LVL III: ICD-10-PCS | Mod: PBBFAC,,, | Performed by: PHYSICIAN ASSISTANT

## 2019-12-10 PROCEDURE — 99999 PR PBB SHADOW E&M-EST. PATIENT-LVL III: CPT | Mod: PBBFAC,,, | Performed by: PHYSICIAN ASSISTANT

## 2019-12-10 NOTE — LETTER
December 10, 2019      Randal Jain, NP  [address]  [facility] [address]           [facility]  [address] [place] [address]  [place] [address]  Phone: [phone]  Fax: [phone]          Patient: Marco Antonio Cisse   MR Number: [id number]   YOB: 1985   Date of Visit: 12/10/2019       Dear Randal Jain:    Thank you for referring Marco Antonio Cisse to me for evaluation. Attached you will find relevant portions of my assessment and plan of care.    If you have questions, please do not hesitate to call me. I look forward to following Marco Antonio Cisse along with you.    Sincerely,    Mckayla Maher PA-C    Enclosure  CC:  No Recipients    If you would like to receive this communication electronically, please contact [email or web address] or [phone] to request more information on [employer] access.    For providers and/or their staff who would like to refer a patient to [facility], please contact us through our one-stop-shop provider referral line, [facility], at [phone].    If you feel you have received this communication in error or would no longer like to receive these types of communications, please e-mail [email or web address]

## 2019-12-10 NOTE — PROGRESS NOTES
"REFERRING PROVIDER  Randal Jain, Np  [address] [place] [address]  Subjective:   Patient: Marco Antonio Cisse [id number], :1985   Visit date:12/10/2019 8:47 AM    Chief Complaint:  Cerumen Impaction    HPI:     Marco Antonio Cisse is a 34 y.o. female whom I am asked to see for evaluation of diminished hearing in both ears for the past several months. There is a prior history of cerumen impaction.    Her meds, allergies, medical, surgical, social & family histories were reviewed & updated:  -     She has a current medication list which includes the following prescription(s): albuterol, butalbital-acetaminophen-caffeine -40 mg, cholecalciferol (vitamin d3), clonazepam, desloratadine, lansoprazole, levocetirizine, methocarbamol, mometasone, prednisone, promethazine-dextromethorphan, and triamcinolone.  -     She  has a past medical history of IBS (irritable bowel syndrome), Insulin resistance syndrome, Migraines, and Vitamin D deficiency disease.   -     She does not have any pertinent problems on file.   -     She  has a past surgical history that includes Keloid excision; Tubal ligation; Colonoscopy (N/A, 10/7/2015); and Shoulder arthroscopy.  -     She  reports that she has never smoked. She has never used smokeless tobacco. She reports that she does not drink alcohol or use drugs.  -     Her family history includes Arthritis in her maternal aunt and mother; Colon polyps in her father; Diabetes in her father; Hypertension in her father and mother; Lupus in her cousin; Stroke in her father.  -     She has No Known Allergies.      Review of Systems:  -     Allergic/Immunologic: has No Known Allergies..  -     Constitutional: Current temp: 98.2 °F (36.8 °C) (Tympanic)        Objective:     Physical Exam:  Vitals:  /87   Pulse 63   Temp 98.2 °F (36.8 °C) (Tympanic)   Ht 5' 6" (1.676 m)   Wt 136 kg (299 lb 13.2 oz)   LMP 2019 (Approximate)   BMI 48.39 kg/m² "   Communication:  Able to communicate, no hoarseness.  Head & Face:  Normocephalic, atraumatic, no sinus tenderness.  Eyes:  Extraocular motions intact.  Ears:  Otoscopy of external auditory canals reveals impaction of bilateral ear canals.  With the patient in the supine position, we used the operating microscope to examine both ears with the appropriate sized ear speculum.  A variety of sterile, micro-instruments were utilized to remove the cerumen atraumatically from the impacted ear(s).   After removal, the ears were reexamined-  Right Ear:  No mass/lesion of auricle. The external auditory canals is without erythema or discharge. Pneumatic otoscopy of the tympanic membrane revealed no perforation and good mobility, with no fluid in middle ear. Clinical speech reception thresholds grossly normal.  Left Ear:  No mass/lesion of auricle. The external auditory canals is without erythema or discharge. Pneumatic otoscopy of the tympanic membrane revealed no perforation and good mobility, with no fluid in middle ear. Clinical speech reception thresholds grossly normal  Nose:  No masses/lesions of external nose, nasal mucosa, septum, and turbinates were within normal limits.  Mouth:  No mass/lesion of lips, teeth, gums, hard/soft palate, tongue, tonsils, or oropharynx.  Neck & Lymphatics:  No cervical lymphadenopathy, no neck mass/crepitus/ asymmetry, trachea is midline, no thyroid enlargement/tenderness/mass.  Neuro/Psych: Alert with normal mood and affect.   Respiration/Chest:  Symmetric expansion during respiration, normal respiratory effort.  Skin:  Warm and intact.    Assessment & Plan:     Cerumen Impaction - Marco Antonio has cerumen impaction.  Impaction was removed without difficulty and the patient tolerated this well. We discussed preventative measures and treatment options.  Q-tips must be avoided, instead the ears can be cleaned with OTC ear rinses (or a mixture of alcohol & vinegar in equal parts).   For hard  wax, Marco Antonio may place mineral oil/baby oil in the ear with a cotton ball at night and remove in the shower.  This will assist in softening the wax and allow it to drain out on its own. If the cerumen impacts the ear canal and causes hearing loss or infection she needs to follow-up in the clinic for treatment and cleaning.      Mckayla Maher PA-C  [facility]   [facility]  [address]  [place] [address]  P: [phone]  F: [phone]

## 2019-12-30 DIAGNOSIS — R51.9 HEADACHE, UNSPECIFIED HEADACHE TYPE: ICD-10-CM

## 2019-12-30 NOTE — TELEPHONE ENCOUNTER
----- Message from Frances Michele sent at 12/30/2019  1:54 PM CST -----  Contact: pt  Type:  RX Refill Request    Who Called: patient  Refill or New Rx New Rx  RX Name and Strength:Headache medication  How is the patient currently taking it? (ex. 1XDay):na  Is this a 30 day or 90 day RX:30  Preferred Pharmacy with phone number:[facility] [place]  Local or Mail Order:local  Ordering Provider:TRISTIN Rodríguez  Would the patient rather a call back or a response via [facility]? na  Best Call Back Number:[phone]  Additional Information: pt do not know the name

## 2019-12-31 RX ORDER — BUTALBITAL, ACETAMINOPHEN AND CAFFEINE 50; 325; 40 MG/1; MG/1; MG/1
1 TABLET ORAL
Qty: 30 TABLET | Refills: 0 | Status: SHIPPED | OUTPATIENT
Start: 2019-12-31 | End: 2020-10-28 | Stop reason: SDUPTHER
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
## 2020-01-16 ENCOUNTER — OFFICE VISIT (OUTPATIENT)
Dept: FAMILY MEDICINE | Facility: CLINIC | Age: 35
End: 2020-01-16
Payer: COMMERCIAL

## 2020-01-16 ENCOUNTER — HOSPITAL ENCOUNTER (OUTPATIENT)
Dept: RADIOLOGY | Facility: HOSPITAL | Age: 35
Discharge: HOME | End: 2020-01-16
Attending: REGISTERED NURSE
Payer: COMMERCIAL

## 2020-01-16 VITALS
DIASTOLIC BLOOD PRESSURE: 78 MMHG | TEMPERATURE: 99 F | WEIGHT: 293 LBS | HEART RATE: 74 BPM | HEIGHT: 66 IN | BODY MASS INDEX: 47.09 KG/M2 | SYSTOLIC BLOOD PRESSURE: 126 MMHG

## 2020-01-16 DIAGNOSIS — M54.41 CHRONIC MIDLINE LOW BACK PAIN WITH RIGHT-SIDED SCIATICA: ICD-10-CM

## 2020-01-16 DIAGNOSIS — G89.29 CHRONIC MIDLINE LOW BACK PAIN WITH RIGHT-SIDED SCIATICA: ICD-10-CM

## 2020-01-16 DIAGNOSIS — F41.0 PANIC DISORDER: Primary | ICD-10-CM

## 2020-01-16 PROCEDURE — 3008F BODY MASS INDEX DOCD: CPT | Mod: CPTII,S$GLB,, | Performed by: REGISTERED NURSE

## 2020-01-16 PROCEDURE — 72100 XR LUMBAR SPINE AP AND LATERAL: ICD-10-PCS | Mod: 26,,, | Performed by: RADIOLOGY

## 2020-01-16 PROCEDURE — 99214 PR OFFICE/OUTPT VISIT, EST, LEVL IV, 30-39 MIN: ICD-10-PCS | Mod: S$GLB,,, | Performed by: REGISTERED NURSE

## 2020-01-16 PROCEDURE — 99999 PR PBB SHADOW E&M-EST. PATIENT-LVL IV: ICD-10-PCS | Mod: PBBFAC,,, | Performed by: REGISTERED NURSE

## 2020-01-16 PROCEDURE — 3008F PR BODY MASS INDEX (BMI) DOCUMENTED: ICD-10-PCS | Mod: CPTII,S$GLB,, | Performed by: REGISTERED NURSE

## 2020-01-16 PROCEDURE — 99214 OFFICE O/P EST MOD 30 MIN: CPT | Mod: S$GLB,,, | Performed by: REGISTERED NURSE

## 2020-01-16 PROCEDURE — 72100 X-RAY EXAM L-S SPINE 2/3 VWS: CPT | Mod: TC,FY,PO

## 2020-01-16 PROCEDURE — 99999 PR PBB SHADOW E&M-EST. PATIENT-LVL IV: CPT | Mod: PBBFAC,,, | Performed by: REGISTERED NURSE

## 2020-01-16 PROCEDURE — 72100 X-RAY EXAM L-S SPINE 2/3 VWS: CPT | Mod: 26,,, | Performed by: RADIOLOGY

## 2020-01-16 RX ORDER — SERTRALINE HYDROCHLORIDE 25 MG/1
25 TABLET, FILM COATED ORAL DAILY
Qty: 30 TABLET | Refills: 6 | Status: SHIPPED | OUTPATIENT
Start: 2020-01-16 | End: 2020-03-26 | Stop reason: SDUPTHER

## 2020-02-17 ENCOUNTER — OFFICE VISIT (OUTPATIENT)
Dept: FAMILY MEDICINE | Facility: CLINIC | Age: 35
End: 2020-02-17
Payer: COMMERCIAL

## 2020-02-17 VITALS
BODY MASS INDEX: 47.09 KG/M2 | HEIGHT: 66 IN | SYSTOLIC BLOOD PRESSURE: 126 MMHG | HEART RATE: 75 BPM | DIASTOLIC BLOOD PRESSURE: 82 MMHG | WEIGHT: 293 LBS | TEMPERATURE: 98 F

## 2020-02-17 DIAGNOSIS — F41.0 PANIC ANXIETY SYNDROME: Primary | ICD-10-CM

## 2020-02-17 PROCEDURE — 99999 PR PBB SHADOW E&M-EST. PATIENT-LVL III: CPT | Mod: PBBFAC,,, | Performed by: REGISTERED NURSE

## 2020-02-17 PROCEDURE — 99213 PR OFFICE/OUTPT VISIT, EST, LEVL III, 20-29 MIN: ICD-10-PCS | Mod: S$GLB,,, | Performed by: REGISTERED NURSE

## 2020-02-17 PROCEDURE — 99213 OFFICE O/P EST LOW 20 MIN: CPT | Mod: S$GLB,,, | Performed by: REGISTERED NURSE

## 2020-02-17 PROCEDURE — 3008F BODY MASS INDEX DOCD: CPT | Mod: CPTII,S$GLB,, | Performed by: REGISTERED NURSE

## 2020-02-17 PROCEDURE — 3008F PR BODY MASS INDEX (BMI) DOCUMENTED: ICD-10-PCS | Mod: CPTII,S$GLB,, | Performed by: REGISTERED NURSE

## 2020-02-17 PROCEDURE — 99999 PR PBB SHADOW E&M-EST. PATIENT-LVL III: ICD-10-PCS | Mod: PBBFAC,,, | Performed by: REGISTERED NURSE

## 2020-02-17 NOTE — PROGRESS NOTES
Subjective:       Patient ID: Marco Antonio Cisse is a 34 y.o. female.    Chief Complaint   Patient presents with    Follow-up       HPI    Marco Antonio Cisse is here today for 1 month follow-up on anxiety.  Last appt 1/16/2019 --- dx with panic disorder and anxiety.  She has been taking Zoloft 25 mg once daily as ordered --- doing better.  Panic attacks less, not as anxious.  Sleeping well.  Denies side effects from drug.  She is happy with current dose and results so far.        Review of Systems   Constitutional: Negative.    Respiratory: Negative.    Cardiovascular: Negative.    Neurological: Negative.    Psychiatric/Behavioral: Negative for agitation, confusion, decreased concentration, dysphoric mood, self-injury, sleep disturbance and suicidal ideas. The patient is nervous/anxious (better).          Review of patient's allergies indicates:  No Known Allergies      Patient Active Problem List   Diagnosis    Migraine headache    Hypermenorrhea    Vitamin D deficiency disease    Insulin resistance    Irritable bowel syndrome (IBS)    Fluid retention    Panic anxiety syndrome       Medication List with Changes/Refills   Current Medications    BUTALBITAL-ACETAMINOPHEN-CAFFEINE -40 MG (FIORICET, ESGIC) -40 MG PER TABLET    Take 1 tablet by mouth every 4 to 6 hours as needed for Headaches.    LANSOPRAZOLE (PREVACID SOLUTAB) 30 MG DISINTEGRATING TABLET    Take 1 tablet (30 mg total) by mouth once daily.    LEVOCETIRIZINE (XYZAL) 5 MG TABLET    Take 1 tablet (5 mg total) by mouth every evening.    SERTRALINE (ZOLOFT) 25 MG TABLET    Take 1 tablet (25 mg total) by mouth once daily.   Discontinued Medications    ALBUTEROL (PROVENTIL/VENTOLIN HFA) 90 MCG/ACTUATION INHALER    Inhale 2 puffs into the lungs every 4 to 6 hours as needed for Wheezing.    CHOLECALCIFEROL, VITAMIN D3, 50,000 UNIT CAPSULE    Take 1 capsule (50,000 Units total) by mouth once a week.    CLONAZEPAM (KLONOPIN) 0.25  "MG TBDL    Take 1 tablet (0.25 mg total) by mouth daily as needed.    DESLORATADINE (CLARINEX) 5 MG TABLET    Take 1 tablet (5 mg total) by mouth once daily.    METHOCARBAMOL (ROBAXIN) 750 MG TAB    Take 1 tablet (750 mg total) by mouth 2 (two) times daily as needed.    MOMETASONE (NASONEX) 50 MCG/ACTUATION NASAL SPRAY    2 sprays by Nasal route once daily.    PREDNISONE (DELTASONE) 20 MG TABLET    Take 2 tab daily x 3 days, 1 tab daily x 3 days, then 1/2 tab daily x 2 days.    PROMETHAZINE-DEXTROMETHORPHAN (PROMETHAZINE-DM) 6.25-15 MG/5 ML SYRP    Take 5 mLs by mouth every 4 to 6 hours as needed.    TRIAMCINOLONE (NASACORT) 55 MCG NASAL INHALER    2 sprays by Nasal route once daily.             Past medical, surgical, family and social histories have been reviewed today.        Objective:     Vitals:    02/17/20 1010   BP: 126/82   Pulse: 75   Temp: 97.7 °F (36.5 °C)   Weight: 135.8 kg (299 lb 6.2 oz)   Height: 5' 6" (1.676 m)   PainSc: 0-No pain       Estimated body mass index is 48.32 kg/m² as calculated from the following:    Height as of this encounter: 5' 6" (1.676 m).    Weight as of this encounter: 135.8 kg (299 lb 6.2 oz).      Physical Exam   Constitutional: She appears well-developed and well-nourished. No distress.   Skin: She is not diaphoretic.   Psychiatric: She has a normal mood and affect. Her speech is normal and behavior is normal. Judgment normal. Thought content is not paranoid and not delusional. Cognition and memory are normal. She expresses no suicidal plans and no homicidal plans. She is attentive.   Vitals reviewed.        Diagnosis       1. Panic anxiety syndrome          Assessment/ Plan     Panic anxiety syndrome  Stable on Zoloft, doing better.  Continue medication as ordered.  Recheck in 3 months, can do as a Tele-Med appt.  Follow-up in clinic as needed.          Patient Care Team:  Randal Jain NP as PCP - General (General Practice)  Severo Gonzalez MD as Obstetrician " (Obstetrics)  Destini Lawson RD as Dietitian (Endocrinology)      ADALBERTO Chavez  [facility]

## 2020-02-19 ENCOUNTER — OFFICE VISIT (OUTPATIENT)
Dept: FAMILY MEDICINE | Facility: CLINIC | Age: 35
End: 2020-02-19
Payer: COMMERCIAL

## 2020-02-19 VITALS
SYSTOLIC BLOOD PRESSURE: 118 MMHG | BODY MASS INDEX: 47.09 KG/M2 | HEIGHT: 66 IN | HEART RATE: 64 BPM | WEIGHT: 293 LBS | DIASTOLIC BLOOD PRESSURE: 84 MMHG | TEMPERATURE: 98 F

## 2020-02-19 DIAGNOSIS — T78.3XXA ANGIOEDEMA, INITIAL ENCOUNTER: Primary | ICD-10-CM

## 2020-02-19 DIAGNOSIS — Z91.013 SHELLFISH ALLERGY: ICD-10-CM

## 2020-02-19 PROCEDURE — 3008F PR BODY MASS INDEX (BMI) DOCUMENTED: ICD-10-PCS | Mod: CPTII,S$GLB,, | Performed by: REGISTERED NURSE

## 2020-02-19 PROCEDURE — 99999 PR PBB SHADOW E&M-EST. PATIENT-LVL III: ICD-10-PCS | Mod: PBBFAC,,, | Performed by: REGISTERED NURSE

## 2020-02-19 PROCEDURE — 99213 PR OFFICE/OUTPT VISIT, EST, LEVL III, 20-29 MIN: ICD-10-PCS | Mod: S$GLB,,, | Performed by: REGISTERED NURSE

## 2020-02-19 PROCEDURE — 99213 OFFICE O/P EST LOW 20 MIN: CPT | Mod: S$GLB,,, | Performed by: REGISTERED NURSE

## 2020-02-19 PROCEDURE — 99999 PR PBB SHADOW E&M-EST. PATIENT-LVL III: CPT | Mod: PBBFAC,,, | Performed by: REGISTERED NURSE

## 2020-02-19 PROCEDURE — 3008F BODY MASS INDEX DOCD: CPT | Mod: CPTII,S$GLB,, | Performed by: REGISTERED NURSE

## 2020-02-19 NOTE — PROGRESS NOTES
"Subjective:       Patient ID: Marco Antonio Cisse is a 34 y.o. female.    Chief Complaint   Patient presents with    Food allergy       HPI    Marco Antonio Cisse is here today with c/o allergic reaction to fod.  Ate boiled crawfish last night for supper around 7 pm.  Woke up this AM at 5:00 with mouth/lip swelling and "throat felt funny".  Having to clear her throat frequently.  Did not want to take Benedryl b/c it would make her too sleepy and she had to get the kids to school.  Ran to the [employer] and picked up some Xyzal.  Felt relief of s/s within 15 to 20 min.  Feeling much better now.  Denies hives or skin rash.  No trouble breathing at this time.      Review of Systems  See HPI      Review of patient's allergies indicates:  No Known Allergies      Patient Active Problem List   Diagnosis    Migraine headache    Hypermenorrhea    Vitamin D deficiency disease    Insulin resistance    Irritable bowel syndrome (IBS)    Fluid retention    Panic anxiety syndrome       Medication List with Changes/Refills   Current Medications    BUTALBITAL-ACETAMINOPHEN-CAFFEINE -40 MG (FIORICET, ESGIC) -40 MG PER TABLET    Take 1 tablet by mouth every 4 to 6 hours as needed for Headaches.    LANSOPRAZOLE (PREVACID SOLUTAB) 30 MG DISINTEGRATING TABLET    Take 1 tablet (30 mg total) by mouth once daily.    LEVOCETIRIZINE (XYZAL) 5 MG TABLET    Take 1 tablet (5 mg total) by mouth every evening.    SERTRALINE (ZOLOFT) 25 MG TABLET    Take 1 tablet (25 mg total) by mouth once daily.             Past medical, surgical, family and social histories have been reviewed today.        Objective:     Vitals:    02/19/20 0822   BP: 118/84   Pulse: 64   Temp: 97.9 °F (36.6 °C)   Weight: 135.7 kg (299 lb 2.6 oz)   Height: 5' 6" (1.676 m)   PainSc: 0-No pain       Estimated body mass index is 48.29 kg/m² as calculated from the following:    Height as of this encounter: 5' 6" (1.676 m).    Weight as of this " encounter: 135.7 kg (299 lb 2.6 oz).      Physical Exam   Constitutional: She appears well-developed and well-nourished. No distress.   HENT:   Head: Normocephalic and atraumatic.   Mouth/Throat: Uvula is midline, oropharynx is clear and moist and mucous membranes are normal. No uvula swelling.   No oral swelling   Eyes: Pupils are equal, round, and reactive to light. EOM are normal.   Cardiovascular: Normal rate and regular rhythm.   Pulmonary/Chest: Effort normal and breath sounds normal. No stridor. She has no wheezes.   Skin: Skin is warm and dry. Capillary refill takes less than 2 seconds. No rash noted. She is not diaphoretic.   Psychiatric: She has a normal mood and affect. Her behavior is normal. Judgment and thought content normal.   Vitals reviewed.        Diagnosis       1. Angioedema, initial encounter    2. Shellfish allergy          Assessment/ Plan     Angioedema, initial encounter  Resolved after taking dose of Xyzal, feeling much better now.    Shellfish allergy  Crawfish ---> angioedema      Handout provided regarding shellfish allergy and reactions.  Action plan discussed for urgent allergic reaction.  Monitor for any further allergy to shellfish such as crab, clams, etc.  To Allergist if needed for further evaluation and/or testing, desensitization, etc.  Follow-up in clinic as needed.          Patient Care Team:  Randal Jain NP as PCP - General (General Practice)  Severo Gonzalez MD as Obstetrician (Obstetrics)  Destini Lawson RD as Dietitian (Endocrinology)      ADALBERTO Chavez  [facility]

## 2020-02-19 NOTE — PATIENT INSTRUCTIONS
Angioedema  Angioedema (MO-yjl-kx-eh-NAYLA-muh) is a sudden appearance of swollen patches (edema) on the skin or mucous membranes. It most often involves the face, lips, mouth, tongue, back of throat, or vocal cords. It may also occur in other places, such as the arms or legs. A rash may also appear during the first 4 days of this illness.  There are different types of angioedema. Your symptoms will depend on what type of angioedema you have. Swelling and redness may be the main symptoms. Like allergic reactions, angioedema may include:  · Rash, hives, redness, welts, blisters  · Itching, burning, stinging, pain  · Dry, flaky, cracking, or scaly skin  · Swelling of the face, lips, tongue, or other parts of the body  More severe symptoms may include:  · Trouble swallowing, or feeling like your throat is closing  · Trouble breathing or wheezing  · Hoarse voice or trouble speaking  · Nausea, vomiting, diarrhea, or stomach cramps  · Feeling faint or lightheaded, rapid heart rate, or low blood pressure  Angioedema can be triggered by exposure to certain substances. Medical conditions involving the immune systems and certain infections may cause it. In rare cases, angioedema can be hereditary. Sometimes the cause may be very clear. However, it is often hard to find a cause. The most common causes include:  · Foods, such as shrimp, shellfish, peanuts, milk products, gluten, and eggs; also colorings, flavorings, and additives  · Insect bites or stings, from bees, mosquitos, fleas, or ticks  · Medicines, such as ACE inhibitors, penicillin, sulfa drugs, amoxicillin, aspirin, and ibuprofen  · Latex, which may be in gloves, clothes, toys, balloons, and some kinds of tape. People who are allergic to latex may have problems with foods such as bananas, avocados, kiwi, papaya, or chestnuts.  · Stress  · Heat, cold, or sunlight  The most common cause of angioedema is a reaction to a class of medicines called ACE inhibitors. These  are used to treat high blood pressure. ACE inhibitors include captopril, enalapril, and lisinopril. Angiodema can happen even after you have been taking the medicine for some time. Tell your doctor if you have angioedema symptoms and are taking any of these medicines. Angioedema may recur. It is important to watch for the earliest signs of this condition (see the list below). Contact your healthcare provider right away if swelling involves the face, mouth, or throat.  Home care  Rest quietly today. Avoid vigorous physical activity.  Medicines: The healthcare provider may prescribe medicines for itching, swelling, or pain. Follow the healthcare providers instructions when taking these medicines.  · Oral diphenhydramine is an antihistamine available without a prescription. Unless a prescription antihistamine was given, diphenhydramine may be used to reduce widespread itching. It may make you sleepy, so be careful using it when going to school, working, or driving. (Note: Do not use diphenhydramine if you have glaucoma or if you are a man who has trouble urinating due to an enlarged prostate.) Loratadine is an antihistamine that may cause less drowsiness.  · Do not use diphenhydramine cream on your skin. Some people can have an allergic reaction to this.  · Calamine lotion or oatmeal baths sometimes help with itching.  · You may use acetaminophen or ibuprofen for pain, unless another pain medicine was prescribed.  · If you were told that your angioedema was caused by a medicine you are taking, you must stop taking it. Ask your healthcare provider for a different one. In the future, advise medical staff that you are allergic to this medicine.  · If medicine was prescribed, such as steroids or antihistamines, be sure you understand what the medicine is and how to take it.   General care  · Make sure you do not scratch areas of the body that had a reaction. This will help prevent infection.   · Stay away from air  pollution, tobacco, and wood smoke. Also stay away from cold temperatures. These things can make allergy symptoms worse.  · Try to find out what cause your reaction. Make sure to remove the allergen. Future reactions may be worse.   · If you have a serious allergy, wear a medical alert bracelet that notes this allergy.  · If the healthcare provider prescribed an epinephrine auto injector kit, keep it with you at all times.   · Tell all care providers about your allergy. Ask them h ow to use any prescribed medicines.  · Keep a record of allergies and symptoms, and when they occurred. This will help your provider treat you over time.   Follow-up care  Follow up with your healthcare provider, or as advised. You may need to see an allergist. An allergist can help find the cause of an allergic reaction and give recommendations on how to prevent future reactions.  Call 911  Contact emergency services right away if any of these occur:  · Trouble breathing or swallowing, or wheezing  · Hoarse voice or trouble speaking, or drooling  · Chest pain or tightness  · Confusion, lightheadedness, or dizziness  · Extreme drowsiness or trouble awakening  · Fainting or loss of consciousness  · Rapid heart rate  · Vomiting blood, or large amounts of blood in stool  · Seizure  · Nausea, vomiting, diarrhea, abdominal pain, or stomach cramps  When to seek medical attention  Call your healthcare provider right away if any of the following occur:  · Symptoms don't go away  · Symptoms come back  · Symptoms get worse or new symptoms develop  · Hives feel uncomfortable  · Fever of 100.4°F (38°C), or as directed by your healthcare professional  Date Last Reviewed: 5/1/2017  © 8372-1652 [employer]. [address] [place] [address], [place] [address]. All rights reserved. This information is not intended as a substitute for professional medical care. Always follow your healthcare professional's instructions.        Food Allergy  The best way  to deal with food allergies is to avoid the foods you are allergic to. Understand and be aware of the foods that you have reacted to. Also be cautious of foods or dishes that may have flavorings or small amounts of foods that you are allergic to.  Symptoms of food allergy may begin within minutes, but can start 2 hours after eating or later. Common symptoms can include:  · Nausea  · Vomiting  · Diarrhea or stomach cramps  · Iitchy rash (hives)  · Swelling of the eyes, lips, face or tongue  · Wheezing  · Difficulty breathing or swallowing  · Throat tightness  · Dizziness or fainting  This kind of allergic reaction, called anaphylaxis, can be life-threatening. In mild and moderate cases the symptoms usually begin improving within 6 to 24 hours. People with certain health problems, such as asthma and eczema, may be more likely to have food allergies. Foods that people are most commonly allergic to are milk or dairy products, eggs, peanuts, tree nuts, soy, shellfish, and wheat. Remember that any food can cause a reaction. Treatment for a severe allergic reaction can include epinephrine. If you have a severe food allergy, or have had severe allergic reactions even if you don't know the cause, you should carry this medicine with you for self-injection. It is available by prescription. It is also available in a lower dose form for children from your healthcare provider.  Home care  The following guidelines will help you care for yourself at home:  · If your symptoms were moderate to severe, they may fluctuate for the next 24 hours. It may be best to rest at home during that time.  · Avoid tobacco and alcohol because they can make symptoms worse. They can also interact with the medicines you are taking to treat the allergic reaction.  · If you know what foods caused your reaction today, avoid them in the future. The next and each reaction after this may make your body more sensitive to these foods. This can cause a worse  "reaction later. Tell your family members, friends, and doctors about your food allergy, especially in an emergency situation since they need to know how to give you epinephrine if you are unable to. This can be life-saving.  · Learn how to read food labels so you can check for the substance that you reacted to. If a food does not have a label, it is best to avoid it. When in restaurants, ask about ingredients and tell the staff, "If I eat a dish containing (food you are allergic to), I could have a severe allergic reaction."  · If your reaction was severe, get a medical alert bracelet or necklace that notes your allergy.  · If epinephrine is prescribed, carry it with you at all times. Learn how to use the device. If you begin to feel the symptoms of another reaction, use the epinephrine to inject yourself right away, and call 911. Dont wait until symptoms become severe.  · Oral allergy medicines (diphenhydramine) are antihistamines that can help with the reaction. You can buy them at any pharmacy or supermarket. They come in liquids, pills, or capsules. Unless your doctor gave you a prescription antihistamine, you can use these medicines to ease itching. Allergy medicines can make you sleepy, so be careful, especially when driving or working. For this reason, you may want to use lower doses during the day and save the higher doses for bedtime. Don't use diphenhydramine if you have glaucoma or if you are a man with trouble urinating because of an enlarged prostate.  · If allergy medicines with diphenhydramine make you too sleepy, talk with your healthcare provider. He or she can recommend an over-the counter antihistamine that won't make you sleepy. These may not work as well, though.  Follow-up care  Follow up with your healthcare provider if your symptoms don't get better over the next 2 to 3 days. If you don't know what caused this reaction, your provider may order skin tests and blood tests, or an elimination " diet. You can find an allergy specialist in your area by contacting:  · American Academy of Allergy, Asthma & Immunology, www.aaaai.org  · American College of Allergy, Asthma & Immunology, www.acaai.org  When to seek medical advice  [facility] provider right away if any of these occur:  · Your symptoms get worse  · New or worse swelling in the face, eyelids, lips, mouth, throat, or tongue  · Mild trouble swallowing, breathing, or wheezing  · Fever of 100.4°F (38.0°C) or higher, or as directed by your health care provider  · Severe abdominal pain  · Persistent vomiting (unable to keep liquids down) or constant diarrhea  · Blood or mucus in the stool  Call 911  If any of these occur, give yourself injectable epinephrine and call 911:  · Significant trouble breathing, talking, or swallowing  · Any change in level of alertness or unconsciousness, including dizziness, weakness, or fainting  · Cool, moist skin  · Fast, weak hearbeat  · Severe wheezing  · Hives  · Severe swelling of the face, tongue, or lips  · Drooling  · Vomiting that happens soon after eating a food you think you are allergic to  · Explosive diarrhea  Date Last Reviewed: 1/11/2016  © 0356-9352 [employer]. [address] [place] [address], [place] [address]. All rights reserved. This information is not intended as a substitute for professional medical care. Always follow your healthcare professional's instructions.

## 2020-03-12 NOTE — PROGRESS NOTES
Subjective:       Patient ID: Marco Antonio Cisse is a 34 y.o. female.    Chief Complaint   Patient presents with    Sinusitis       HPI    Patient with onset of s/s 2 days ago.  History of allergies.  Reports clear nasal drainage, mild cough and popping in ears.  No meds taken.        Review of Systems   Constitutional: Negative for chills, diaphoresis, fatigue and fever.   HENT: Positive for congestion, ear pain, postnasal drip and rhinorrhea. Negative for sinus pain, sneezing, sore throat, trouble swallowing and voice change.    Eyes: Negative.    Respiratory: Positive for cough. Negative for shortness of breath and wheezing.    Cardiovascular: Negative for chest pain, palpitations and leg swelling.   Gastrointestinal: Negative for abdominal pain, nausea and vomiting.   Musculoskeletal: Negative.    Skin: Negative.    Allergic/Immunologic: Positive for environmental allergies.   Neurological: Negative for weakness, light-headedness, numbness and headaches.   Hematological: Negative for adenopathy.         Review of patient's allergies indicates:  No Known Allergies      Patient Active Problem List   Diagnosis    Migraine headache    Hypermenorrhea    Vitamin D deficiency disease    Insulin resistance    Irritable bowel syndrome (IBS)    Fluid retention    Panic anxiety syndrome         Current Outpatient Medications:     butalbital-acetaminophen-caffeine -40 mg (FIORICET, ESGIC) -40 mg per tablet, Take 1 tablet by mouth every 4 to 6 hours as needed for Headaches., Disp: 30 tablet, Rfl: 0    lansoprazole (PREVACID SOLUTAB) 30 MG disintegrating tablet, Take 1 tablet (30 mg total) by mouth once daily., Disp: 30 tablet, Rfl: 11    levocetirizine (XYZAL) 5 MG tablet, Take 1 tablet (5 mg total) by mouth every evening., Disp: 30 tablet, Rfl: 11    sertraline (ZOLOFT) 25 MG tablet, Take 1 tablet (25 mg total) by mouth once daily., Disp: 30 tablet, Rfl: 6          Past medical, surgical,  "family and social histories have been reviewed today.        Objective:     Vitals:    03/13/20 0834   BP: 138/82   Pulse: 70   Temp: 96.4 °F (35.8 °C)   Weight: 134.9 kg (297 lb 5.4 oz)   Height: 5' 6" (1.676 m)   PainSc: 0-No pain           Physical Exam   Constitutional: She is oriented to person, place, and time. She appears well-developed and well-nourished.   HENT:   Head: Normocephalic and atraumatic.   Right Ear: Tympanic membrane normal.   Left Ear: Tympanic membrane normal.   Nose: Mucosal edema and rhinorrhea (boggy//clear rn) present. Right sinus exhibits no maxillary sinus tenderness and no frontal sinus tenderness. Left sinus exhibits no maxillary sinus tenderness and no frontal sinus tenderness.   Mouth/Throat: Oropharynx is clear and moist and mucous membranes are normal. No oropharyngeal exudate, posterior oropharyngeal edema or posterior oropharyngeal erythema.   Eyes: Pupils are equal, round, and reactive to light. EOM are normal. Right eye exhibits discharge (both eyes with slight watery d/c and redness). Left eye exhibits discharge.   Cardiovascular: Normal rate and regular rhythm.   Pulmonary/Chest: Effort normal and breath sounds normal.   Musculoskeletal: Normal range of motion. She exhibits no edema.   Lymphadenopathy:     She has no cervical adenopathy.   Neurological: She is alert and oriented to person, place, and time.   Skin: Skin is warm and dry. Capillary refill takes less than 2 seconds. No rash noted.   Psychiatric: She has a normal mood and affect. Her behavior is normal. Judgment and thought content normal.   Vitals reviewed.        Diagnosis       1. Allergic rhinitis, unspecified seasonality, unspecified trigger          Assessment/ Plan     Allergic rhinitis, unspecified seasonality, unspecified trigger --- allergies flaring, need to restart allergy medication.  To take a once daily Claritin or Zyrtec, add nasal spray if needed.  Rest and fluids.    Follow-up in clinic as " needed.          ADALBERTO Chavez  [facility]

## 2020-03-13 ENCOUNTER — OFFICE VISIT (OUTPATIENT)
Dept: FAMILY MEDICINE | Facility: CLINIC | Age: 35
End: 2020-03-13
Payer: COMMERCIAL

## 2020-03-13 VITALS
HEART RATE: 70 BPM | SYSTOLIC BLOOD PRESSURE: 138 MMHG | WEIGHT: 293 LBS | TEMPERATURE: 96 F | BODY MASS INDEX: 47.09 KG/M2 | DIASTOLIC BLOOD PRESSURE: 82 MMHG | HEIGHT: 66 IN

## 2020-03-13 DIAGNOSIS — J30.9 ALLERGIC RHINITIS, UNSPECIFIED SEASONALITY, UNSPECIFIED TRIGGER: Primary | ICD-10-CM

## 2020-03-13 PROCEDURE — 99213 PR OFFICE/OUTPT VISIT, EST, LEVL III, 20-29 MIN: ICD-10-PCS | Mod: S$GLB,,, | Performed by: REGISTERED NURSE

## 2020-03-13 PROCEDURE — 99999 PR PBB SHADOW E&M-EST. PATIENT-LVL III: CPT | Mod: PBBFAC,,, | Performed by: REGISTERED NURSE

## 2020-03-13 PROCEDURE — 3008F BODY MASS INDEX DOCD: CPT | Mod: CPTII,S$GLB,, | Performed by: REGISTERED NURSE

## 2020-03-13 PROCEDURE — 3008F PR BODY MASS INDEX (BMI) DOCUMENTED: ICD-10-PCS | Mod: CPTII,S$GLB,, | Performed by: REGISTERED NURSE

## 2020-03-13 PROCEDURE — 99999 PR PBB SHADOW E&M-EST. PATIENT-LVL III: ICD-10-PCS | Mod: PBBFAC,,, | Performed by: REGISTERED NURSE

## 2020-03-13 PROCEDURE — 99213 OFFICE O/P EST LOW 20 MIN: CPT | Mod: S$GLB,,, | Performed by: REGISTERED NURSE

## 2020-03-26 ENCOUNTER — TELEPHONE (OUTPATIENT)
Dept: FAMILY MEDICINE | Facility: CLINIC | Age: 35
End: 2020-03-26

## 2020-03-26 DIAGNOSIS — F41.0 PANIC DISORDER: ICD-10-CM

## 2020-03-26 DIAGNOSIS — R06.89 DYSPNEA AND RESPIRATORY ABNORMALITIES: ICD-10-CM

## 2020-03-26 DIAGNOSIS — R06.00 DYSPNEA AND RESPIRATORY ABNORMALITIES: ICD-10-CM

## 2020-03-26 RX ORDER — ALBUTEROL SULFATE 90 UG/1
2 AEROSOL, METERED RESPIRATORY (INHALATION)
Qty: 18 G | Refills: 3 | Status: SHIPPED | OUTPATIENT
Start: 2020-03-26 | End: 2021-01-07 | Stop reason: SDUPTHER

## 2020-03-26 RX ORDER — SERTRALINE HYDROCHLORIDE 50 MG/1
50 TABLET, FILM COATED ORAL DAILY
Qty: 30 TABLET | Refills: 6 | Status: SHIPPED | OUTPATIENT
Start: 2020-03-26 | End: 2021-06-15

## 2020-03-26 NOTE — TELEPHONE ENCOUNTER
I have increased the Zoloft from 25 to 50 mg once daily.  Need her to f/u with me via video in 2 weeks.

## 2020-03-26 NOTE — TELEPHONE ENCOUNTER
----- Message from Nathalia Silverman sent at 3/26/2020  2:27 PM CDT -----  Contact: self  Requesting call back regarding getting dosage on anxiety medication increased and also to get a refill on pt inhaler. Pt uses   [id number] - [place], [place] - [address] [facility] [address]  [address]  [place] [address]  Phone: [phone] Fax: [phone]   Please call back at [phone].    Thanks,  Nathalia Silverman

## 2020-04-07 ENCOUNTER — TELEPHONE (OUTPATIENT)
Dept: FAMILY MEDICINE | Facility: CLINIC | Age: 35
End: 2020-04-07

## 2020-04-07 ENCOUNTER — OFFICE VISIT (OUTPATIENT)
Dept: FAMILY MEDICINE | Facility: CLINIC | Age: 35
End: 2020-04-07
Payer: COMMERCIAL

## 2020-04-07 DIAGNOSIS — F41.9 ANXIETY: ICD-10-CM

## 2020-04-07 DIAGNOSIS — R07.89 CHEST WALL PAIN: Primary | ICD-10-CM

## 2020-04-07 PROCEDURE — 99213 PR OFFICE/OUTPT VISIT, EST, LEVL III, 20-29 MIN: ICD-10-PCS | Mod: 95,,, | Performed by: REGISTERED NURSE

## 2020-04-07 PROCEDURE — 99213 OFFICE O/P EST LOW 20 MIN: CPT | Mod: 95,,, | Performed by: REGISTERED NURSE

## 2020-04-07 RX ORDER — DICLOFENAC POTASSIUM 50 MG/1
50 TABLET, FILM COATED ORAL 3 TIMES DAILY PRN
Qty: 30 TABLET | Refills: 0 | Status: SHIPPED | OUTPATIENT
Start: 2020-04-07 | End: 2021-03-02

## 2020-04-07 RX ORDER — METHOCARBAMOL 500 MG/1
500 TABLET, FILM COATED ORAL 3 TIMES DAILY PRN
Qty: 30 TABLET | Refills: 0 | Status: SHIPPED | OUTPATIENT
Start: 2020-04-07 | End: 2020-06-30

## 2020-04-07 NOTE — PROGRESS NOTES
PRIMARY CARE TELEMEDICINE NOTE    TELEPHONE ENCOUNTER  Patient checked in for video visit but unable to connect.  Conducted visit by telephone using audio only.      The patient location is:  Patient Home   The chief complaint leading to consultation is: Chest soreness  Total time spent with patient: 20 min    Visit type: Virtual visit with synchronous audio only  Each patient to whom he or she provides medical services by telemedicine is:  (1) informed of the relationship between the physician and patient and the respective role of any other health care provider with respect to management of the patient.  (2) notified that he or she may decline to receive medical services by telemedicine and may withdraw from such care at any time.      SUBJECTIVE:       Patient ID: Marco Antonio Cisse is a 34 y.o. female.    Chief Complaint: Chest Pain    Marco Antonio reports chest soreness/pain to the breastbone area for the past few months, worse over past 4 days.  Denies injury or trauma.  Pain feels like a tightness or pressure, pain to palpation and movement.  Does get short-winded at times, using inhaler which does help.  Denies pain with DB.  No upper back pain, decreased ROM to upper extremities, fever or chills.  She has been on Zoloft for about 3 months now, stopped taking the medication about 4 days ago (around the time where her chest pain worsened).  However, she does report intermittent episodes of chest soreness/pain while still taking the medication.  She does not feel that the medication was helping but she also states her stress level most recently has not been difficult to manage or handle.  She was seen 2 months ago at urgent care for similar issue and was treated for a MSK problem; she reports never taking the muscle relaxer as the steroid shot she was given there did help.      Review of patient's allergies indicates:  No Known Allergies      Patient Active Problem List   Diagnosis    Migraine  headache    Hypermenorrhea    Vitamin D deficiency disease    Insulin resistance    Irritable bowel syndrome (IBS)    Fluid retention    Panic anxiety syndrome           Current Outpatient Medications:     albuterol (PROVENTIL/VENTOLIN HFA) 90 mcg/actuation inhaler, Inhale 2 puffs into the lungs every 4 to 6 hours as needed for Wheezing., Disp: 18 g, Rfl: 3    butalbital-acetaminophen-caffeine -40 mg (FIORICET, ESGIC) -40 mg per tablet, Take 1 tablet by mouth every 4 to 6 hours as needed for Headaches., Disp: 30 tablet, Rfl: 0    lansoprazole (PREVACID SOLUTAB) 30 MG disintegrating tablet, Take 1 tablet (30 mg total) by mouth once daily., Disp: 30 tablet, Rfl: 11    levocetirizine (XYZAL) 5 MG tablet, Take 1 tablet (5 mg total) by mouth every evening., Disp: 30 tablet, Rfl: 11    sertraline (ZOLOFT) 50 MG tablet, Take 1 tablet (50 mg total) by mouth once daily., Disp: 30 tablet, Rfl: 6        Past Medical History:   Diagnosis Date    IBS (irritable bowel syndrome)     Insulin resistance syndrome     Migraines     Vitamin D deficiency disease          Family History   Problem Relation Age of Onset    Hypertension Mother     Arthritis Mother     Diabetes Father     Hypertension Father     Stroke Father     Colon polyps Father     Arthritis Maternal Aunt     Lupus Cousin     Cancer Neg Hx          Past Surgical History:   Procedure Laterality Date    COLONOSCOPY N/A 10/7/2015    Procedure: COLONOSCOPY;  Surgeon: Kris Barker MD;  Location: [facility];  Service: Endoscopy;  Laterality: N/A;    KELOID EXCISION      SHOULDER ARTHROSCOPY      TUBAL LIGATION           Social History     Socioeconomic History    Marital status:     Number of children: 2   Occupational History    Occupation:      Employer: [employer]       Review of Systems   Constitutional: Negative for activity change, appetite change, chills and fever.   HENT: Negative.    Eyes:  Negative.    Respiratory: Positive for chest tightness and shortness of breath. Negative for wheezing and stridor.    Cardiovascular: Positive for chest pain. Negative for palpitations and leg swelling.   Neurological: Negative.    Psychiatric/Behavioral: Positive for sleep disturbance. Negative for agitation, decreased concentration, dysphoric mood, self-injury and suicidal ideas. The patient is nervous/anxious.            OBJECTIVE:    Physical Exam:  Constitutional: The patient is oriented to person, place, and time.  Appears well-developed and well-nourished.   Respiratory:  Unlabored, in no resp distress.  Neurological: Alert and oriented to person, place, and time.   Psychiatric: Normal mood and affect, behavior is normal. Judgment and thought content normal.       Complete PE deferred today due to audio visit        ASSESSMENT:        1. Chest wall pain    2. Anxiety            PLAN:    Chest wall pain  Recurrent episodes of chest wall pain, likely MSK in nature.  Denies any new injury or trauma.  NSAID and muscle relaxer as ordered.  Local heat.  Avoid any heavy lifting, pushing or pulling.  -     methocarbamoL (ROBAXIN) 500 MG Tab; Take 1 tablet (500 mg total) by mouth 3 (three) times daily as needed.  Dispense: 30 tablet; Refill: 0  -     diclofenac (CATAFLAM) 50 MG tablet; Take 1 tablet (50 mg total) by mouth 3 (three) times daily as needed. Take with food  Dispense: 30 tablet; Refill: 0    Anxiety  Stable, not on medication at this time x 4 days.  Will try keeping her off anti-anxiety medication for now.  If something is needed in the future, will consider starting her on an SSRI/SNRI or alternative.  Stay off Zoloft.  Advised her that in the future, this type of medication should not be stopped abruptly, needs to be weaned.  Exercise, meditation, coping skills, etc.        Follow-up:  She has been instructed to contact me in 2 to 3 days to let me know how she is feeling.  To ED for any urgent issue that  needs immediate evaluation.  Otherwise, she will return to clinic prn.

## 2020-04-07 NOTE — TELEPHONE ENCOUNTER
----- Message from Елена Bahena sent at 4/7/2020  3:20 PM CDT -----  Contact: self  Pt is requesting a call regarding tightness between breat area. Please call pt back at [phone]

## 2020-05-21 ENCOUNTER — TELEPHONE (OUTPATIENT)
Dept: FAMILY MEDICINE | Facility: CLINIC | Age: 35
End: 2020-05-21

## 2020-05-21 NOTE — TELEPHONE ENCOUNTER
----- Message from Janette Miguel sent at 5/21/2020  1:23 PM CDT -----  Contact: PATIENT  CALLING REQUESTING A Z-PACK FOR A SINUS INFECTION. PLEASE CALL PATIENT @ [phone]. THANKS      [id number] - [place] - [address] [facility] [address]  [address]  [place] [address]  Phone: [phone] Fax: [phone]

## 2020-05-22 NOTE — TELEPHONE ENCOUNTER
Called and spoke with patient in reference to her message about z-jacquie. Patient advised she is not having any symptoms . I advised patient that provider will not give medication out just like that. Patient said her head was hurting a little bit on yesterday and thought it was sinus. I advised her to try something over the counter, and if that don't work please call back  for an appt.

## 2020-05-22 NOTE — TELEPHONE ENCOUNTER
Need more information -----------    Any fever/chills?  Cough?  When did it start?  Meds taking for it now?  Drainage, color?

## 2020-06-23 ENCOUNTER — LAB VISIT (OUTPATIENT)
Dept: LAB | Facility: HOSPITAL | Age: 35
End: 2020-06-23
Attending: REGISTERED NURSE
Payer: COMMERCIAL

## 2020-06-23 ENCOUNTER — OFFICE VISIT (OUTPATIENT)
Dept: FAMILY MEDICINE | Facility: CLINIC | Age: 35
End: 2020-06-23
Payer: COMMERCIAL

## 2020-06-23 DIAGNOSIS — Z20.822 EXPOSURE TO COVID-19 VIRUS: Primary | ICD-10-CM

## 2020-06-23 DIAGNOSIS — Z20.822 EXPOSURE TO COVID-19 VIRUS: ICD-10-CM

## 2020-06-23 LAB — SARS-COV-2 IGG SERPLBLD QL IA.RAPID: NEGATIVE

## 2020-06-23 PROCEDURE — 99213 OFFICE O/P EST LOW 20 MIN: CPT | Mod: 95,,, | Performed by: REGISTERED NURSE

## 2020-06-23 PROCEDURE — 86769 SARS-COV-2 COVID-19 ANTIBODY: CPT

## 2020-06-23 PROCEDURE — 99213 PR OFFICE/OUTPT VISIT, EST, LEVL III, 20-29 MIN: ICD-10-PCS | Mod: 95,,, | Performed by: REGISTERED NURSE

## 2020-06-23 PROCEDURE — 36415 COLL VENOUS BLD VENIPUNCTURE: CPT | Mod: PO

## 2020-06-23 NOTE — PROGRESS NOTES
PRIMARY CARE TELEMEDICINE NOTE      Patient location:  In car, work parking lot  Visit type: Virtual visit with synchronous audio and video  Each patient to whom he or she provides medical services by telemedicine is:  (1) informed of the relationship between the physician and patient and the respective role of any other health care provider with respect to management of the patient  (2) notified that he or she may decline to receive medical services by telemedicine and may withdraw from such care at any time        SUBJECTIVE     Patient ID: Marco Antonio Cisse is a 34 y.o. female.      CHIEF COMPLAINT:  COVID TESTING      HPI    Patient wondering if she needs to be COVID tested.  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  Has been exposed, no current symptoms.  Reports increased stress levels due to thought of catching infection. Does take every precaution including mask, disinfectant, social distancing etc.        Review of patient's allergies indicates:  No Known Allergies      Patient Active Problem List    Diagnosis Date Noted    Panic anxiety syndrome 07/16/2019    Fluid retention 12/06/2018    Irritable bowel syndrome (IBS) 09/01/2015    Migraine headache 07/02/2013    Hypermenorrhea 07/02/2013    Vitamin D deficiency disease 07/02/2013    Insulin resistance 07/02/2013         Current Outpatient Medications:     albuterol (PROVENTIL/VENTOLIN HFA) 90 mcg/actuation inhaler, Inhale 2 puffs into the lungs every 4 to 6 hours as needed for Wheezing., Disp: 18 g, Rfl: 3    butalbital-acetaminophen-caffeine -40 mg (FIORICET, ESGIC) -40 mg per tablet, Take 1 tablet by mouth every 4 to 6 hours as needed for Headaches., Disp: 30 tablet, Rfl: 0    diclofenac (CATAFLAM) 50 MG tablet, Take 1 tablet (50 mg total) by mouth 3 (three) times daily as needed. Take with food, Disp: 30 tablet, Rfl: 0    lansoprazole (PREVACID SOLUTAB) 30 MG disintegrating tablet, Take 1 tablet (30 mg total) by  mouth once daily., Disp: 30 tablet, Rfl: 11    levocetirizine (XYZAL) 5 MG tablet, Take 1 tablet (5 mg total) by mouth every evening., Disp: 30 tablet, Rfl: 11    methocarbamoL (ROBAXIN) 500 MG Tab, Take 1 tablet (500 mg total) by mouth 3 (three) times daily as needed., Disp: 30 tablet, Rfl: 0    mometasone (NASONEX) 50 mcg/actuation nasal spray, SHAKE LIQUID AND USE 2 SPRAYS IN EACH NOSTRIL EVERY DAY, Disp: 17 g, Rfl: 3    sertraline (ZOLOFT) 50 MG tablet, Take 1 tablet (50 mg total) by mouth once daily., Disp: 30 tablet, Rfl: 6      Past Medical History:   Diagnosis Date    IBS (irritable bowel syndrome)     Insulin resistance syndrome     Migraines     Vitamin D deficiency disease        Past Surgical History:   Procedure Laterality Date    COLONOSCOPY N/A 10/7/2015    Procedure: COLONOSCOPY;  Surgeon: Kris Barker MD;  Location: [facility];  Service: Endoscopy;  Laterality: N/A;    KELOID EXCISION      SHOULDER ARTHROSCOPY      TUBAL LIGATION         Family History   Problem Relation Age of Onset    Hypertension Mother     Arthritis Mother     Diabetes Father     Hypertension Father     Stroke Father     Colon polyps Father     Arthritis Maternal Aunt     Lupus Cousin     Cancer Neg Hx        Social History     Tobacco Use    Smoking status: Never Smoker    Smokeless tobacco: Never Used   Substance Use Topics    Alcohol use: No     Alcohol/week: 0.0 standard drinks    Drug use: No         REVIEW OF SYSTEMS  Review of Systems   Constitutional: Negative for activity change and unexpected weight change.   HENT: Negative for hearing loss, rhinorrhea and trouble swallowing.    Eyes: Negative for discharge and visual disturbance.   Respiratory: Negative for cough, chest tightness and wheezing.    Cardiovascular: Negative for chest pain and palpitations.   Gastrointestinal: Negative for blood in stool, constipation, diarrhea and vomiting.   Endocrine: Negative for polydipsia and  polyuria.   Genitourinary: Negative for difficulty urinating, dysuria, hematuria and menstrual problem.   Musculoskeletal: Negative for arthralgias, joint swelling and neck pain.   Neurological: Negative for weakness and headaches.   Psychiatric/Behavioral: Negative for confusion and dysphoric mood.          OBJECTIVE:  Physical Exam:  Constitutional:  In NAD, pleasant, cooperative.  Appears well-developed and well-nourished.   Respiratory:  Unlabored, in no resp distress.  Neurological: Alert and oriented to person, place, and time.   Psychiatric: Normal mood and affect, behavior is normal. Judgment and thought content normal.       Complete PE deferred due to video visit      ASSESSMENT       1. Exposure to Covid-19 Virus           PLAN    Exposure to Covid-19 Virus --- will get antibody test due to being asymptomatic.  -     COVID-19 (SARS CoV-2) IgG Antibody; Future; Expected date: 06/23/2020          Follow-up:  Lab pending.  Return prn.        Total time spent with patient was 15 minutes to include discussion, counseling and chart review.  All patient questions were answered at appointment.

## 2020-06-24 ENCOUNTER — TELEPHONE (OUTPATIENT)
Dept: FAMILY MEDICINE | Facility: CLINIC | Age: 35
End: 2020-06-24

## 2020-06-24 NOTE — TELEPHONE ENCOUNTER
----- Message from Rocio Merino sent at 6/24/2020  7:56 AM CDT -----  Type:  Test Results    Who Called:  Alex Bernard  Name of Test (Lab/Mammo/Etc):   Lab  Date of Test:    Ordering Provider:   Ms Jain  Where the test was performed:    Would the patient rather a call back or a response via [facility]?   Call back  Best Call Back Number:    [phone]  Additional Information:   States she is calling to have her results explained to her//please call//thanks/keerthi

## 2020-06-29 ENCOUNTER — TELEPHONE (OUTPATIENT)
Dept: FAMILY MEDICINE | Facility: CLINIC | Age: 35
End: 2020-06-29

## 2020-06-29 NOTE — TELEPHONE ENCOUNTER
S/w pt advised pt urgent care or hospital pt refused and said she will wait to see the NP, appt scheduled for 06/30/20 pt verbalized understanding.

## 2020-06-29 NOTE — TELEPHONE ENCOUNTER
----- Message from Marti Hutchins sent at 6/29/2020 12:44 PM CDT -----  Regarding: Left side pain  Pt is left side pain severe since Friday.  Pt can be reached to [phone]    Pt would like to an image or an viritual visit please      Please give a call back.

## 2020-06-30 ENCOUNTER — HOSPITAL ENCOUNTER (OUTPATIENT)
Dept: RADIOLOGY | Facility: HOSPITAL | Age: 35
Discharge: HOME | End: 2020-06-30
Attending: REGISTERED NURSE
Payer: COMMERCIAL

## 2020-06-30 ENCOUNTER — OFFICE VISIT (OUTPATIENT)
Dept: FAMILY MEDICINE | Facility: CLINIC | Age: 35
End: 2020-06-30
Payer: COMMERCIAL

## 2020-06-30 VITALS
TEMPERATURE: 98 F | WEIGHT: 293 LBS | OXYGEN SATURATION: 97 % | BODY MASS INDEX: 47.09 KG/M2 | HEIGHT: 66 IN | SYSTOLIC BLOOD PRESSURE: 138 MMHG | HEART RATE: 77 BPM | DIASTOLIC BLOOD PRESSURE: 88 MMHG

## 2020-06-30 DIAGNOSIS — R10.9 ACUTE LEFT FLANK PAIN: ICD-10-CM

## 2020-06-30 DIAGNOSIS — R10.9 ACUTE LEFT FLANK PAIN: Primary | ICD-10-CM

## 2020-06-30 DIAGNOSIS — R10.9 STOMACH PAIN: ICD-10-CM

## 2020-06-30 LAB
BILIRUB SERPL-MCNC: NORMAL MG/DL
BLOOD URINE, POC: NORMAL
CLARITY, POC UA: CLEAR
COLOR, POC UA: NORMAL
GLUCOSE UR QL STRIP: NORMAL
KETONES UR QL STRIP: NORMAL
LEUKOCYTE ESTERASE URINE, POC: NORMAL
NITRITE, POC UA: NORMAL
PH, POC UA: 7.5
PROTEIN, POC: NORMAL
SPECIFIC GRAVITY, POC UA: 1.01
UROBILINOGEN, POC UA: NORMAL

## 2020-06-30 PROCEDURE — 81002 URINALYSIS NONAUTO W/O SCOPE: CPT | Mod: S$GLB,,, | Performed by: REGISTERED NURSE

## 2020-06-30 PROCEDURE — 74019 RADEX ABDOMEN 2 VIEWS: CPT | Mod: 26,,, | Performed by: RADIOLOGY

## 2020-06-30 PROCEDURE — 99999 PR PBB SHADOW E&M-EST. PATIENT-LVL III: CPT | Mod: PBBFAC,,, | Performed by: REGISTERED NURSE

## 2020-06-30 PROCEDURE — 81002 POCT URINE DIPSTICK WITHOUT MICROSCOPE: ICD-10-PCS | Mod: S$GLB,,, | Performed by: REGISTERED NURSE

## 2020-06-30 PROCEDURE — 99214 PR OFFICE/OUTPT VISIT, EST, LEVL IV, 30-39 MIN: ICD-10-PCS | Mod: 25,S$GLB,, | Performed by: REGISTERED NURSE

## 2020-06-30 PROCEDURE — 72070 XR THORACIC SPINE AP LATERAL: ICD-10-PCS | Mod: 26,,, | Performed by: RADIOLOGY

## 2020-06-30 PROCEDURE — 72070 X-RAY EXAM THORAC SPINE 2VWS: CPT | Mod: TC,FY,PO

## 2020-06-30 PROCEDURE — 72070 X-RAY EXAM THORAC SPINE 2VWS: CPT | Mod: 26,,, | Performed by: RADIOLOGY

## 2020-06-30 PROCEDURE — 74019 XR ABDOMEN FLAT AND ERECT: ICD-10-PCS | Mod: 26,,, | Performed by: RADIOLOGY

## 2020-06-30 PROCEDURE — 99999 PR PBB SHADOW E&M-EST. PATIENT-LVL III: ICD-10-PCS | Mod: PBBFAC,,, | Performed by: REGISTERED NURSE

## 2020-06-30 PROCEDURE — 74019 RADEX ABDOMEN 2 VIEWS: CPT | Mod: TC,FY,PO

## 2020-06-30 PROCEDURE — 3008F PR BODY MASS INDEX (BMI) DOCUMENTED: ICD-10-PCS | Mod: CPTII,S$GLB,, | Performed by: REGISTERED NURSE

## 2020-06-30 PROCEDURE — 3008F BODY MASS INDEX DOCD: CPT | Mod: CPTII,S$GLB,, | Performed by: REGISTERED NURSE

## 2020-06-30 PROCEDURE — 99214 OFFICE O/P EST MOD 30 MIN: CPT | Mod: 25,S$GLB,, | Performed by: REGISTERED NURSE

## 2020-06-30 NOTE — PROGRESS NOTES
Subjective:       Patient ID: Marco Antonio Cisse is a 34 y.o. female.    Chief Complaint   Patient presents with    Flank Pain       HPI    Patient here today with c/o left side, stomach and flank pain for the past few days with worsening pain since onset.  Thought she was constipated, took 1 bottle of Mag-Citrate w/ good BM this morning.  Usually goes daily.  Treating w/ ice, heat, diclofenac, increased fiber, greens and pickle juice.  She is concerned for a kidney issue.  Pain increases w/ cough, sneezing or movement.  Not able to lay on left side.  Denies hematuria, fever or chills.  She denies h/o renal stone or chronic kidney issues.      Review of Systems   Constitutional: Positive for activity change. Negative for appetite change, chills and fever.   Respiratory: Negative.    Cardiovascular: Negative.    Gastrointestinal: Positive for abdominal pain. Negative for abdominal distention, anal bleeding, blood in stool, constipation, diarrhea, nausea, rectal pain and vomiting.   Genitourinary: Negative.    Musculoskeletal: Positive for back pain. Negative for gait problem, joint swelling and neck pain.   Skin: Negative.    Neurological: Negative.          Review of patient's allergies indicates:  No Known Allergies      Patient Active Problem List   Diagnosis    Migraine headache    Hypermenorrhea    Vitamin D deficiency disease    Insulin resistance    Irritable bowel syndrome (IBS)    Fluid retention    Panic anxiety syndrome         Current Outpatient Medications:     albuterol (PROVENTIL/VENTOLIN HFA) 90 mcg/actuation inhaler, Inhale 2 puffs into the lungs every 4 to 6 hours as needed for Wheezing., Disp: 18 g, Rfl: 3    butalbital-acetaminophen-caffeine -40 mg (FIORICET, ESGIC) -40 mg per tablet, Take 1 tablet by mouth every 4 to 6 hours as needed for Headaches., Disp: 30 tablet, Rfl: 0    diclofenac (CATAFLAM) 50 MG tablet, Take 1 tablet (50 mg total) by mouth 3 (three) times  "daily as needed. Take with food, Disp: 30 tablet, Rfl: 0    sertraline (ZOLOFT) 50 MG tablet, Take 1 tablet (50 mg total) by mouth once daily., Disp: 30 tablet, Rfl: 6    lansoprazole (PREVACID SOLUTAB) 30 MG disintegrating tablet, Take 1 tablet (30 mg total) by mouth once daily., Disp: 30 tablet, Rfl: 11    levocetirizine (XYZAL) 5 MG tablet, Take 1 tablet (5 mg total) by mouth every evening., Disp: 30 tablet, Rfl: 11        Past medical, surgical, family and social histories have been reviewed today.      Objective:     Vitals:    06/30/20 0826   BP: 138/88   Pulse: 77   Temp: 98.1 °F (36.7 °C)   TempSrc: Oral   SpO2: 97%   Weight: 136 kg (299 lb 13.2 oz)   Height: 5' 6" (1.676 m)   PainSc:   8         Physical Exam  Constitutional:       Appearance: She is not ill-appearing.   HENT:      Head: Normocephalic and atraumatic.   Cardiovascular:      Rate and Rhythm: Normal rate and regular rhythm.      Pulses: Normal pulses.   Pulmonary:      Effort: Pulmonary effort is normal.      Breath sounds: Normal breath sounds.   Abdominal:      General: Bowel sounds are normal. There is no distension.      Tenderness: There is abdominal tenderness. There is no guarding or rebound. Negative signs include Thompson's sign and McBurney's sign.       Musculoskeletal: Normal range of motion.         General: No swelling.        Back:    Skin:     General: Skin is warm and dry.      Capillary Refill: Capillary refill takes less than 2 seconds.      Findings: No erythema or rash.   Neurological:      Mental Status: She is alert and oriented to person, place, and time.      Sensory: No sensory deficit.      Motor: No weakness.      Coordination: Coordination normal.      Gait: Gait normal.   Psychiatric:         Mood and Affect: Mood normal.         Behavior: Behavior normal.         Thought Content: Thought content normal.         Judgment: Judgment normal.         POCT urine dipstick without microscope  Component 10d ago   Color, " UA Theresa    Spec Grav UA 1.010    pH, UA 7.5    WBC, UA neg    Nitrite, UA neg    Protein neg    Glucose, UA neg    Ketones, UA neg    Urobilinogen, UA nml    Bilirubin neg    Blood, UA neg    Clarity, UA Clear                   Diagnosis       1. Acute left flank pain    2. Stomach pain          Assessment/ Plan     Acute left flank pain --- new onset, cause unclear at this time.  Check xray.  UA clear.  -     X-Ray Abdomen Flat And Erect; Future; Expected date: 06/30/2020  -     X-Ray Thoracic Spine AP Lateral; Future; Expected date: 06/30/2020  -     POCT urine dipstick without microscope    Stomach pain --- as above.  -     X-Ray Abdomen Flat And Erect; Future; Expected date: 06/30/2020  -     POCT urine dipstick without microscope        Follow-up:    Xray pending.  To ED if s/s worsen.  Return prn.        ADALBERTO Chavez  [facility]

## 2020-07-02 RX ORDER — LANSOPRAZOLE 30 MG/1
30 TABLET, ORALLY DISINTEGRATING, DELAYED RELEASE ORAL DAILY
Qty: 30 TABLET | Refills: 11 | Status: SHIPPED | OUTPATIENT
Start: 2020-07-02 | End: 2021-03-02

## 2020-07-20 ENCOUNTER — TELEPHONE (OUTPATIENT)
Dept: FAMILY MEDICINE | Facility: CLINIC | Age: 35
End: 2020-07-20

## 2020-07-20 DIAGNOSIS — E55.9 VITAMIN D DEFICIENCY DISEASE: Primary | ICD-10-CM

## 2020-07-20 NOTE — TELEPHONE ENCOUNTER
She was on 50,000 once a week --- can find this in the history/medcard.  BUT --- she has not had her levels checked since 2018.  Needs to get updated lab before refilling any more.  She may not need so high of a dose.  Lab ordered.

## 2020-07-20 NOTE — TELEPHONE ENCOUNTER
----- Message from Alfred Stephenson sent at 7/20/2020 10:33 AM CDT -----  Regarding: Pre-auth  Marquida Brandon calling regarding Pre-Auth on her Vitamin D tablets      --------------[id number] - [place] - [address] [employer] [address]      Please advise pt can be contact at [phone]

## 2020-08-24 ENCOUNTER — TELEPHONE (OUTPATIENT)
Dept: INTERNAL MEDICINE | Facility: CLINIC | Age: 35
End: 2020-08-24

## 2020-08-24 NOTE — TELEPHONE ENCOUNTER
Spoke with pt, she stated she wants a virtual visit as soon as possible. Scheduled for tomorrow morning as requested. Pt having sinus problems and no taste.

## 2020-08-24 NOTE — TELEPHONE ENCOUNTER
----- Message from Randal Jain NP sent at 8/24/2020  4:27 PM CDT -----  Regarding: FW: Questions  Contact: Pt    ----- Message -----  From: Tien Vines  Sent: 8/24/2020   4:05 PM CDT  To: Randal Jain NP  Subject: Questions                                        Pt is calling the staff regarding questions about the pt Sinus and right hip pain    Pt call back to be advice today [phone]    thanks

## 2020-08-25 ENCOUNTER — LAB VISIT (OUTPATIENT)
Dept: INTERNAL MEDICINE | Facility: CLINIC | Age: 35
End: 2020-08-25
Payer: COMMERCIAL

## 2020-08-25 ENCOUNTER — OFFICE VISIT (OUTPATIENT)
Dept: FAMILY MEDICINE | Facility: CLINIC | Age: 35
End: 2020-08-25
Payer: COMMERCIAL

## 2020-08-25 DIAGNOSIS — R43.0 ANOSMIA: Primary | ICD-10-CM

## 2020-08-25 DIAGNOSIS — R52 GENERALIZED BODY ACHES: ICD-10-CM

## 2020-08-25 DIAGNOSIS — R43.0 ANOSMIA: ICD-10-CM

## 2020-08-25 DIAGNOSIS — R51.9 HEADACHE, UNSPECIFIED HEADACHE TYPE: ICD-10-CM

## 2020-08-25 PROCEDURE — 99213 OFFICE O/P EST LOW 20 MIN: CPT | Mod: 95,,, | Performed by: REGISTERED NURSE

## 2020-08-25 PROCEDURE — U0003 INFECTIOUS AGENT DETECTION BY NUCLEIC ACID (DNA OR RNA); SEVERE ACUTE RESPIRATORY SYNDROME CORONAVIRUS 2 (SARS-COV-2) (CORONAVIRUS DISEASE [COVID-19]), AMPLIFIED PROBE TECHNIQUE, MAKING USE OF HIGH THROUGHPUT TECHNOLOGIES AS DESCRIBED BY CMS-2020-01-R: HCPCS

## 2020-08-25 PROCEDURE — 99213 PR OFFICE/OUTPT VISIT, EST, LEVL III, 20-29 MIN: ICD-10-PCS | Mod: 95,,, | Performed by: REGISTERED NURSE

## 2020-08-25 NOTE — PROGRESS NOTES
PRIMARY CARE TELEMEDICINE NOTE      Patient location:  Home  Visit type: Virtual visit with audio only --- able to connect to video but no sound --- changed to audio only via telephone.  Each patient to whom he or she provides medical services by telemedicine is:  (1) informed of the relationship between the physician and patient and the respective role of any other health care provider with respect to management of the patient  (2) notified that he or she may decline to receive medical services by telemedicine and may withdraw from such care at any time        SUBJECTIVE     Patient ID: Marco Antonio Cisse is a 35 y.o. female.      CHIEF COMPLAINT:  NO TASTE//SINUS ISSUES      HPI    Marco Antonio has not been feeling well x few days.  Reports anosmia, aches, headache, and sweats.  No fever or chills, NVD, sob, cp or dry cough.  Having mild RN and NC.  Treating with Tylenol.  Has been isolating self at home.        Review of patient's allergies indicates:  No Known Allergies      Patient Active Problem List    Diagnosis Date Noted    Panic anxiety syndrome 07/16/2019    Fluid retention 12/06/2018    Irritable bowel syndrome (IBS) 09/01/2015    Migraine headache 07/02/2013    Hypermenorrhea 07/02/2013    Vitamin D deficiency disease 07/02/2013    Insulin resistance 07/02/2013         Current Outpatient Medications:     albuterol (PROVENTIL/VENTOLIN HFA) 90 mcg/actuation inhaler, Inhale 2 puffs into the lungs every 4 to 6 hours as needed for Wheezing., Disp: 18 g, Rfl: 3    butalbital-acetaminophen-caffeine -40 mg (FIORICET, ESGIC) -40 mg per tablet, Take 1 tablet by mouth every 4 to 6 hours as needed for Headaches., Disp: 30 tablet, Rfl: 0    diclofenac (CATAFLAM) 50 MG tablet, Take 1 tablet (50 mg total) by mouth 3 (three) times daily as needed. Take with food, Disp: 30 tablet, Rfl: 0    lansoprazole (PREVACID SOLUTAB) 30 MG disintegrating tablet, Take 1 tablet (30 mg total) by mouth once  daily., Disp: 30 tablet, Rfl: 11    levocetirizine (XYZAL) 5 MG tablet, Take 1 tablet (5 mg total) by mouth every evening., Disp: 30 tablet, Rfl: 11    sertraline (ZOLOFT) 50 MG tablet, Take 1 tablet (50 mg total) by mouth once daily., Disp: 30 tablet, Rfl: 6      Past Medical History:   Diagnosis Date    IBS (irritable bowel syndrome)     Insulin resistance syndrome     Migraines     Vitamin D deficiency disease        Past Surgical History:   Procedure Laterality Date    COLONOSCOPY N/A 10/7/2015    Procedure: COLONOSCOPY;  Surgeon: Kris Barker MD;  Location: [facility];  Service: Endoscopy;  Laterality: N/A;    KELOID EXCISION      SHOULDER ARTHROSCOPY      TUBAL LIGATION         Family History   Problem Relation Age of Onset    Hypertension Mother     Arthritis Mother     Diabetes Father     Hypertension Father     Stroke Father     Colon polyps Father     Arthritis Maternal Aunt     Lupus Cousin     Cancer Neg Hx        Social History     Tobacco Use    Smoking status: Never Smoker    Smokeless tobacco: Never Used   Substance Use Topics    Alcohol use: No     Alcohol/week: 0.0 standard drinks    Drug use: No         REVIEW OF SYSTEMS  Review of Systems   Constitutional: Positive for diaphoresis. Negative for activity change, appetite change, chills, fatigue, fever and unexpected weight change.   HENT: Positive for congestion and rhinorrhea. Negative for hearing loss, sinus pressure, sore throat and trouble swallowing.         Pos for anosmia   Eyes: Negative for discharge and visual disturbance.   Respiratory: Negative.  Negative for chest tightness and wheezing.    Cardiovascular: Negative.  Negative for chest pain and palpitations.   Gastrointestinal: Negative for blood in stool, constipation, diarrhea, nausea and vomiting.   Endocrine: Negative for polydipsia and polyuria.   Genitourinary: Negative for difficulty urinating, dysuria, hematuria and menstrual problem.    Musculoskeletal: Positive for arthralgias and myalgias. Negative for joint swelling and neck pain.   Neurological: Positive for headaches. Negative for weakness, light-headedness and numbness.   Psychiatric/Behavioral: Negative for confusion and dysphoric mood.          OBJECTIVE:  Physical Exam:  Constitutional:  In NAD, pleasant, cooperative.   Respiratory:  Unlabored, in no resp distress.  No dyspnea.  Neurological: Alert and oriented to person, place, and time.   Psychiatric: Normal mood, behavior is normal. Judgment and thought content normal.       Complete PE deferred due to audio visit      ASSESSMENT       1. Anosmia    2. Generalized body aches    3. Headache, unspecified headache type           PLAN    Anosmia  -     COVID-19 Routine Screening; Future; Expected date: 08/25/2020    Generalized body aches  -     COVID-19 Routine Screening; Future; Expected date: 08/25/2020    Headache, unspecified headache type        Continue to quarantine pending lab results.  Symptomatic care, rest & fluids.      Follow-up:  Return prn.          Total time spent with patient was 15 minutes to include discussion, counseling and chart review.  All patient questions were answered at appointment.

## 2020-08-26 ENCOUNTER — TELEPHONE (OUTPATIENT)
Dept: FAMILY MEDICINE | Facility: CLINIC | Age: 35
End: 2020-08-26

## 2020-08-26 DIAGNOSIS — U07.1 COVID-19 VIRUS DETECTED: ICD-10-CM

## 2020-08-26 LAB — SARS-COV-2 RNA RESP QL NAA+PROBE: DETECTED

## 2020-08-26 NOTE — TELEPHONE ENCOUNTER
I sent her instructions to her portal --- basically treat as she would the flu.  Stay home, quarantine.  Get rest and stay hydrated. Take over-the-counter medicines for symptomatic control.  Stay in a specific room and away from other people; use separate bathroom.  Must wear a mask if around others.  Notify office of worsening symptoms.

## 2020-08-26 NOTE — TELEPHONE ENCOUNTER
----- Message from Maria Eugenia Tovar sent at 8/26/2020  2:29 PM CDT -----  Regarding: pt  Would like a call from nurse in regards to her positive test results to covid. Please call back at .[phone] (home)           Thank You,   Maria Eugenia Tovar

## 2020-09-10 ENCOUNTER — TELEPHONE (OUTPATIENT)
Dept: FAMILY MEDICINE | Facility: CLINIC | Age: 35
End: 2020-09-10

## 2020-09-10 NOTE — TELEPHONE ENCOUNTER
----- Message from Frances Michele sent at 9/10/2020  1:29 PM CDT -----  Please call pt @ [phone], pt have question regarding recheck for Covid-19, pt states it been 15day.

## 2020-09-10 NOTE — TELEPHONE ENCOUNTER
Called pt and she wanted to know if she needed to be retested.  Advised no she was ok. That we do not retest.

## 2020-10-05 ENCOUNTER — TELEPHONE (OUTPATIENT)
Dept: FAMILY MEDICINE | Facility: CLINIC | Age: 35
End: 2020-10-05

## 2020-10-05 NOTE — TELEPHONE ENCOUNTER
Called pt and pt wanted to make sure she could get a flu shot and make an appt. appt scheduled for flu shot

## 2020-10-05 NOTE — TELEPHONE ENCOUNTER
----- Message from Griselda Owens sent at 10/5/2020 10:09 AM CDT -----  States she wants to know if she needs to get the flu shot. States she had covid in August, she lost her taste and smell. She needs to know what to do. Bridget call pt [phone]. Thank you

## 2020-10-08 ENCOUNTER — IMMUNIZATION (OUTPATIENT)
Dept: FAMILY MEDICINE | Facility: CLINIC | Age: 35
End: 2020-10-08
Payer: COMMERCIAL

## 2020-10-08 ENCOUNTER — LAB VISIT (OUTPATIENT)
Dept: LAB | Facility: HOSPITAL | Age: 35
End: 2020-10-08
Attending: REGISTERED NURSE
Payer: COMMERCIAL

## 2020-10-08 DIAGNOSIS — E55.9 VITAMIN D DEFICIENCY DISEASE: ICD-10-CM

## 2020-10-08 PROCEDURE — 36415 COLL VENOUS BLD VENIPUNCTURE: CPT | Mod: PO

## 2020-10-08 PROCEDURE — 90471 FLU VACCINE (QUAD) GREATER THAN OR EQUAL TO 3YO PRESERVATIVE FREE IM: ICD-10-PCS | Mod: S$GLB,,, | Performed by: REGISTERED NURSE

## 2020-10-08 PROCEDURE — 82306 VITAMIN D 25 HYDROXY: CPT

## 2020-10-08 PROCEDURE — 90686 IIV4 VACC NO PRSV 0.5 ML IM: CPT | Mod: S$GLB,,, | Performed by: REGISTERED NURSE

## 2020-10-08 PROCEDURE — 90471 IMMUNIZATION ADMIN: CPT | Mod: S$GLB,,, | Performed by: REGISTERED NURSE

## 2020-10-08 PROCEDURE — 99999 PR PBB SHADOW E&M-EST. PATIENT-LVL I: CPT | Mod: PBBFAC,,,

## 2020-10-08 PROCEDURE — 99999 PR PBB SHADOW E&M-EST. PATIENT-LVL I: ICD-10-PCS | Mod: PBBFAC,,,

## 2020-10-08 PROCEDURE — 90686 FLU VACCINE (QUAD) GREATER THAN OR EQUAL TO 3YO PRESERVATIVE FREE IM: ICD-10-PCS | Mod: S$GLB,,, | Performed by: REGISTERED NURSE

## 2020-10-09 LAB — 25(OH)D3+25(OH)D2 SERPL-MCNC: 25 NG/ML (ref 30–96)

## 2020-10-28 DIAGNOSIS — R51.9 HEADACHE, UNSPECIFIED HEADACHE TYPE: ICD-10-CM

## 2020-10-28 RX ORDER — BUTALBITAL, ACETAMINOPHEN AND CAFFEINE 50; 325; 40 MG/1; MG/1; MG/1
1 TABLET ORAL
Qty: 30 TABLET | Refills: 0 | OUTPATIENT
Start: 2020-10-28

## 2020-10-28 RX ORDER — BUTALBITAL, ACETAMINOPHEN AND CAFFEINE 50; 325; 40 MG/1; MG/1; MG/1
1 TABLET ORAL
Qty: 30 TABLET | Refills: 0 | Status: SHIPPED | OUTPATIENT
Start: 2020-10-28 | End: 2021-09-22

## 2020-12-09 ENCOUNTER — HOSPITAL ENCOUNTER (OUTPATIENT)
Dept: RADIOLOGY | Facility: HOSPITAL | Age: 35
Discharge: HOME | End: 2020-12-09
Attending: REGISTERED NURSE
Payer: COMMERCIAL

## 2020-12-09 ENCOUNTER — OFFICE VISIT (OUTPATIENT)
Dept: FAMILY MEDICINE | Facility: CLINIC | Age: 35
End: 2020-12-09
Payer: COMMERCIAL

## 2020-12-09 VITALS
SYSTOLIC BLOOD PRESSURE: 116 MMHG | TEMPERATURE: 98 F | WEIGHT: 293 LBS | HEIGHT: 66 IN | HEART RATE: 86 BPM | DIASTOLIC BLOOD PRESSURE: 86 MMHG | BODY MASS INDEX: 47.09 KG/M2

## 2020-12-09 DIAGNOSIS — M79.672 LEFT FOOT PAIN: ICD-10-CM

## 2020-12-09 DIAGNOSIS — M79.672 LEFT FOOT PAIN: Primary | ICD-10-CM

## 2020-12-09 PROCEDURE — 99999 PR PBB SHADOW E&M-EST. PATIENT-LVL III: ICD-10-PCS | Mod: PBBFAC,,, | Performed by: REGISTERED NURSE

## 2020-12-09 PROCEDURE — 73630 X-RAY EXAM OF FOOT: CPT | Mod: TC,FY,PO,LT

## 2020-12-09 PROCEDURE — 99999 PR PBB SHADOW E&M-EST. PATIENT-LVL III: CPT | Mod: PBBFAC,,, | Performed by: REGISTERED NURSE

## 2020-12-09 PROCEDURE — 1125F AMNT PAIN NOTED PAIN PRSNT: CPT | Mod: S$GLB,,, | Performed by: REGISTERED NURSE

## 2020-12-09 PROCEDURE — 1125F PR PAIN SEVERITY QUANTIFIED, PAIN PRESENT: ICD-10-PCS | Mod: S$GLB,,, | Performed by: REGISTERED NURSE

## 2020-12-09 PROCEDURE — 73630 X-RAY EXAM OF FOOT: CPT | Mod: 26,LT,, | Performed by: RADIOLOGY

## 2020-12-09 PROCEDURE — 3008F BODY MASS INDEX DOCD: CPT | Mod: CPTII,S$GLB,, | Performed by: REGISTERED NURSE

## 2020-12-09 PROCEDURE — 73630 XR FOOT COMPLETE 3 VIEW LEFT: ICD-10-PCS | Mod: 26,LT,, | Performed by: RADIOLOGY

## 2020-12-09 PROCEDURE — 99213 OFFICE O/P EST LOW 20 MIN: CPT | Mod: S$GLB,,, | Performed by: REGISTERED NURSE

## 2020-12-09 PROCEDURE — 99213 PR OFFICE/OUTPT VISIT, EST, LEVL III, 20-29 MIN: ICD-10-PCS | Mod: S$GLB,,, | Performed by: REGISTERED NURSE

## 2020-12-09 PROCEDURE — 3008F PR BODY MASS INDEX (BMI) DOCUMENTED: ICD-10-PCS | Mod: CPTII,S$GLB,, | Performed by: REGISTERED NURSE

## 2020-12-09 NOTE — PROGRESS NOTES
Subjective:       Patient ID: Marco Antonio Cisse is a 35 y.o. female.      Chief Complaint   Patient presents with    Foot Pain       Marco Antonio reports left foot pain x 1 month.  Denies injury or trauma.  Denies frequent or prolonged walking.  Some prolonged standing (does hair).  Treating with Tylenol and warm soaks at times.        Review of Systems   Constitutional: Negative.    Musculoskeletal: Positive for arthralgias. Negative for gait problem and joint swelling.   Skin: Negative.    Neurological: Negative.          Review of patient's allergies indicates:  No Known Allergies      Patient Active Problem List   Diagnosis    Migraine headache    Hypermenorrhea    Vitamin D deficiency disease    Insulin resistance    Irritable bowel syndrome (IBS)    Fluid retention    Panic anxiety syndrome           Current Outpatient Medications:     albuterol (PROVENTIL/VENTOLIN HFA) 90 mcg/actuation inhaler, Inhale 2 puffs into the lungs every 4 to 6 hours as needed for Wheezing., Disp: 18 g, Rfl: 3    butalbital-acetaminophen-caffeine -40 mg (FIORICET, ESGIC) -40 mg per tablet, Take 1 tablet by mouth every 4 to 6 hours as needed for Headaches., Disp: 30 tablet, Rfl: 0    diclofenac (CATAFLAM) 50 MG tablet, Take 1 tablet (50 mg total) by mouth 3 (three) times daily as needed. Take with food, Disp: 30 tablet, Rfl: 0    lansoprazole (PREVACID SOLUTAB) 30 MG disintegrating tablet, Take 1 tablet (30 mg total) by mouth once daily., Disp: 30 tablet, Rfl: 11    levocetirizine (XYZAL) 5 MG tablet, Take 1 tablet (5 mg total) by mouth every evening., Disp: 30 tablet, Rfl: 11    sertraline (ZOLOFT) 50 MG tablet, Take 1 tablet (50 mg total) by mouth once daily., Disp: 30 tablet, Rfl: 6        Past medical, surgical, family and social histories have been reviewed today.      Objective:     Vitals:    12/09/20 1045   BP: 116/86   Pulse: 86   Temp: 98.2 °F (36.8 °C)   TempSrc: Oral   Weight: 134.6 kg (296 lb  "11.8 oz)   Height: 5' 6" (1.676 m)   PainSc:   6   PainLoc: Foot         Physical Exam  Vitals signs reviewed.   Cardiovascular:      Pulses:           Dorsalis pedis pulses are 2+ on the left side.        Posterior tibial pulses are 2+ on the left side.   Musculoskeletal:      Left foot: Normal range of motion. No deformity.        Feet:    Feet:      Left foot:      Skin integrity: Skin integrity normal.      Toenail Condition: Left toenails are normal.   Skin:     Capillary Refill: Capillary refill takes less than 2 seconds.   Neurological:      Mental Status: She is alert.      Motor: No weakness.      Gait: Gait normal.   Psychiatric:         Mood and Affect: Mood normal.         Behavior: Behavior normal.         Thought Content: Thought content normal.         Judgment: Judgment normal.             Assessment         ICD-10-CM ICD-9-CM    1. Left foot pain  M79.672 729.5 X-Ray Foot Complete Left    Exact cause unclear, highly likely some form of plantar fasciitis.  Elevate, cold packs, proper shoes with inserts if needed.  Xray pending.  Advil and/or Tylenol prn pain.  Voltaren gel otc suggested.           Plan     May need to see Podiatry if s/s worsen or persist.      Follow-up     Return prn.      ADALBERTO Chavez  [facility]     "

## 2020-12-10 ENCOUNTER — PATIENT MESSAGE (OUTPATIENT)
Dept: FAMILY MEDICINE | Facility: CLINIC | Age: 35
End: 2020-12-10

## 2021-01-07 DIAGNOSIS — R06.89 DYSPNEA AND RESPIRATORY ABNORMALITIES: ICD-10-CM

## 2021-01-07 DIAGNOSIS — R06.00 DYSPNEA AND RESPIRATORY ABNORMALITIES: ICD-10-CM

## 2021-01-07 RX ORDER — ALBUTEROL SULFATE 90 UG/1
2 AEROSOL, METERED RESPIRATORY (INHALATION)
Qty: 18 G | Refills: 3 | Status: SHIPPED | OUTPATIENT
Start: 2021-01-07 | End: 2022-04-10

## 2021-01-12 ENCOUNTER — TELEPHONE (OUTPATIENT)
Dept: FAMILY MEDICINE | Facility: CLINIC | Age: 36
End: 2021-01-12

## 2021-03-02 ENCOUNTER — OFFICE VISIT (OUTPATIENT)
Dept: FAMILY MEDICINE | Facility: CLINIC | Age: 36
End: 2021-03-02
Payer: COMMERCIAL

## 2021-03-02 VITALS
OXYGEN SATURATION: 99 % | HEIGHT: 66 IN | DIASTOLIC BLOOD PRESSURE: 88 MMHG | WEIGHT: 293 LBS | HEART RATE: 80 BPM | SYSTOLIC BLOOD PRESSURE: 122 MMHG | BODY MASS INDEX: 47.09 KG/M2 | RESPIRATION RATE: 18 BRPM | TEMPERATURE: 98 F

## 2021-03-02 DIAGNOSIS — S90.529A: Primary | ICD-10-CM

## 2021-03-02 PROCEDURE — 99213 OFFICE O/P EST LOW 20 MIN: CPT | Mod: S$GLB,,, | Performed by: REGISTERED NURSE

## 2021-03-02 PROCEDURE — 99999 PR PBB SHADOW E&M-EST. PATIENT-LVL III: ICD-10-PCS | Mod: PBBFAC,,, | Performed by: REGISTERED NURSE

## 2021-03-02 PROCEDURE — 99213 PR OFFICE/OUTPT VISIT, EST, LEVL III, 20-29 MIN: ICD-10-PCS | Mod: S$GLB,,, | Performed by: REGISTERED NURSE

## 2021-03-02 PROCEDURE — 99999 PR PBB SHADOW E&M-EST. PATIENT-LVL III: CPT | Mod: PBBFAC,,, | Performed by: REGISTERED NURSE

## 2021-03-02 RX ORDER — SILVER SULFADIAZINE 10 G/1000G
CREAM TOPICAL 2 TIMES DAILY
Qty: 50 G | Refills: 0 | Status: SHIPPED | OUTPATIENT
Start: 2021-03-02 | End: 2021-06-15

## 2021-03-22 ENCOUNTER — TELEPHONE (OUTPATIENT)
Dept: FAMILY MEDICINE | Facility: CLINIC | Age: 36
End: 2021-03-22

## 2021-03-22 DIAGNOSIS — J06.9 ACUTE URI: ICD-10-CM

## 2021-03-22 RX ORDER — FLUTICASONE PROPIONATE 50 MCG
2 SPRAY, SUSPENSION (ML) NASAL DAILY
Qty: 16 G | Refills: 2 | Status: SHIPPED | OUTPATIENT
Start: 2021-03-22 | End: 2021-04-21

## 2021-05-05 ENCOUNTER — HOSPITAL ENCOUNTER (OUTPATIENT)
Dept: RADIOLOGY | Facility: HOSPITAL | Age: 36
Discharge: HOME | End: 2021-05-05
Attending: REGISTERED NURSE
Payer: COMMERCIAL

## 2021-05-05 ENCOUNTER — OFFICE VISIT (OUTPATIENT)
Dept: FAMILY MEDICINE | Facility: CLINIC | Age: 36
End: 2021-05-05
Payer: COMMERCIAL

## 2021-05-05 VITALS
RESPIRATION RATE: 18 BRPM | SYSTOLIC BLOOD PRESSURE: 119 MMHG | WEIGHT: 293 LBS | TEMPERATURE: 98 F | HEIGHT: 66 IN | BODY MASS INDEX: 47.09 KG/M2 | HEART RATE: 78 BPM | DIASTOLIC BLOOD PRESSURE: 80 MMHG | OXYGEN SATURATION: 99 %

## 2021-05-05 DIAGNOSIS — R10.9 ABDOMINAL CRAMPING: ICD-10-CM

## 2021-05-05 DIAGNOSIS — Z87.19 HISTORY OF IBS: ICD-10-CM

## 2021-05-05 DIAGNOSIS — Z91.013 ALLERGIC TO SHELLFISH: ICD-10-CM

## 2021-05-05 DIAGNOSIS — R19.8 RECTAL PRESSURE: Primary | ICD-10-CM

## 2021-05-05 DIAGNOSIS — R19.8 RECTAL PRESSURE: ICD-10-CM

## 2021-05-05 PROCEDURE — 99214 OFFICE O/P EST MOD 30 MIN: CPT | Mod: S$GLB,,, | Performed by: REGISTERED NURSE

## 2021-05-05 PROCEDURE — 99214 PR OFFICE/OUTPT VISIT, EST, LEVL IV, 30-39 MIN: ICD-10-PCS | Mod: S$GLB,,, | Performed by: REGISTERED NURSE

## 2021-05-05 PROCEDURE — 99999 PR PBB SHADOW E&M-EST. PATIENT-LVL IV: ICD-10-PCS | Mod: PBBFAC,,, | Performed by: REGISTERED NURSE

## 2021-05-05 PROCEDURE — 74019 XR ABDOMEN FLAT AND ERECT: ICD-10-PCS | Mod: 26,,, | Performed by: RADIOLOGY

## 2021-05-05 PROCEDURE — 74019 RADEX ABDOMEN 2 VIEWS: CPT | Mod: TC,FY,PO

## 2021-05-05 PROCEDURE — 74019 RADEX ABDOMEN 2 VIEWS: CPT | Mod: 26,,, | Performed by: RADIOLOGY

## 2021-05-05 PROCEDURE — 99999 PR PBB SHADOW E&M-EST. PATIENT-LVL IV: CPT | Mod: PBBFAC,,, | Performed by: REGISTERED NURSE

## 2021-05-06 ENCOUNTER — PATIENT MESSAGE (OUTPATIENT)
Dept: FAMILY MEDICINE | Facility: CLINIC | Age: 36
End: 2021-05-06

## 2021-05-10 ENCOUNTER — TELEPHONE (OUTPATIENT)
Dept: FAMILY MEDICINE | Facility: CLINIC | Age: 36
End: 2021-05-10

## 2021-05-10 ENCOUNTER — PATIENT MESSAGE (OUTPATIENT)
Dept: FAMILY MEDICINE | Facility: CLINIC | Age: 36
End: 2021-05-10

## 2021-06-15 ENCOUNTER — TELEPHONE (OUTPATIENT)
Dept: FAMILY MEDICINE | Facility: CLINIC | Age: 36
End: 2021-06-15

## 2021-06-15 ENCOUNTER — OFFICE VISIT (OUTPATIENT)
Dept: FAMILY MEDICINE | Facility: CLINIC | Age: 36
End: 2021-06-15
Payer: COMMERCIAL

## 2021-06-15 VITALS
RESPIRATION RATE: 18 BRPM | HEART RATE: 84 BPM | TEMPERATURE: 98 F | WEIGHT: 283.81 LBS | SYSTOLIC BLOOD PRESSURE: 120 MMHG | HEIGHT: 66 IN | OXYGEN SATURATION: 98 % | BODY MASS INDEX: 45.61 KG/M2 | DIASTOLIC BLOOD PRESSURE: 82 MMHG

## 2021-06-15 DIAGNOSIS — L02.92 BOIL: Primary | ICD-10-CM

## 2021-06-15 PROCEDURE — 99213 OFFICE O/P EST LOW 20 MIN: CPT | Mod: S$GLB,,, | Performed by: REGISTERED NURSE

## 2021-06-15 PROCEDURE — 99999 PR PBB SHADOW E&M-EST. PATIENT-LVL III: ICD-10-PCS | Mod: PBBFAC,,, | Performed by: REGISTERED NURSE

## 2021-06-15 PROCEDURE — 99213 PR OFFICE/OUTPT VISIT, EST, LEVL III, 20-29 MIN: ICD-10-PCS | Mod: S$GLB,,, | Performed by: REGISTERED NURSE

## 2021-06-15 PROCEDURE — 99999 PR PBB SHADOW E&M-EST. PATIENT-LVL III: CPT | Mod: PBBFAC,,, | Performed by: REGISTERED NURSE

## 2021-06-15 RX ORDER — MUPIROCIN 20 MG/G
OINTMENT TOPICAL 3 TIMES DAILY
Qty: 30 G | Refills: 0 | Status: SHIPPED | OUTPATIENT
Start: 2021-06-15 | End: 2021-06-25

## 2021-06-15 RX ORDER — SULFAMETHOXAZOLE AND TRIMETHOPRIM 800; 160 MG/1; MG/1
1 TABLET ORAL 2 TIMES DAILY
Qty: 14 TABLET | Refills: 0 | Status: SHIPPED | OUTPATIENT
Start: 2021-06-15 | End: 2021-06-22

## 2021-07-08 ENCOUNTER — OFFICE VISIT (OUTPATIENT)
Dept: FAMILY MEDICINE | Facility: CLINIC | Age: 36
End: 2021-07-08
Payer: COMMERCIAL

## 2021-07-08 VITALS
HEIGHT: 66 IN | BODY MASS INDEX: 46.41 KG/M2 | WEIGHT: 288.81 LBS | DIASTOLIC BLOOD PRESSURE: 90 MMHG | OXYGEN SATURATION: 100 % | TEMPERATURE: 98 F | HEART RATE: 81 BPM | SYSTOLIC BLOOD PRESSURE: 119 MMHG

## 2021-07-08 DIAGNOSIS — G89.29 CHRONIC MIDLINE THORACIC BACK PAIN: Primary | ICD-10-CM

## 2021-07-08 DIAGNOSIS — M54.6 CHRONIC MIDLINE THORACIC BACK PAIN: Primary | ICD-10-CM

## 2021-07-08 PROCEDURE — 99213 OFFICE O/P EST LOW 20 MIN: CPT | Mod: S$GLB,,, | Performed by: FAMILY MEDICINE

## 2021-07-08 PROCEDURE — 99999 PR PBB SHADOW E&M-EST. PATIENT-LVL III: ICD-10-PCS | Mod: PBBFAC,,, | Performed by: FAMILY MEDICINE

## 2021-07-08 PROCEDURE — 99213 PR OFFICE/OUTPT VISIT, EST, LEVL III, 20-29 MIN: ICD-10-PCS | Mod: S$GLB,,, | Performed by: FAMILY MEDICINE

## 2021-07-08 PROCEDURE — 99999 PR PBB SHADOW E&M-EST. PATIENT-LVL III: CPT | Mod: PBBFAC,,, | Performed by: FAMILY MEDICINE

## 2021-07-08 RX ORDER — DICLOFENAC POTASSIUM 50 MG/1
50 TABLET, FILM COATED ORAL 3 TIMES DAILY PRN
Qty: 30 TABLET | Refills: 0 | Status: SHIPPED | OUTPATIENT
Start: 2021-07-08 | End: 2021-08-19

## 2021-07-08 RX ORDER — METHOCARBAMOL 500 MG/1
500 TABLET, FILM COATED ORAL 3 TIMES DAILY PRN
Qty: 30 TABLET | Refills: 0 | Status: SHIPPED | OUTPATIENT
Start: 2021-07-08 | End: 2021-08-19

## 2021-07-29 ENCOUNTER — TELEPHONE (OUTPATIENT)
Dept: FAMILY MEDICINE | Facility: CLINIC | Age: 36
End: 2021-07-29

## 2021-08-17 ENCOUNTER — TELEPHONE (OUTPATIENT)
Dept: INTERNAL MEDICINE | Facility: CLINIC | Age: 36
End: 2021-08-17
Payer: COMMERCIAL

## 2021-08-17 NOTE — TELEPHONE ENCOUNTER
----- Message from Rafaela Cooley sent at 8/17/2021 12:41 PM CDT -----  Contact: [phone]  Patient is returning a phone call.  Who left a message for the patient: Rhianna Garner LPN   Does patient know what this is regarding:    Comments:   Appt for clearance

## 2021-08-17 NOTE — TELEPHONE ENCOUNTER
Called # listed, LMOM for pt to call back for preop appt scheduled. Randal stated mgt be able to be virtual-- get with her prior to scheduling per phone note from July.

## 2021-08-19 ENCOUNTER — OFFICE VISIT (OUTPATIENT)
Dept: FAMILY MEDICINE | Facility: CLINIC | Age: 36
End: 2021-08-19
Payer: COMMERCIAL

## 2021-08-19 VITALS
TEMPERATURE: 98 F | DIASTOLIC BLOOD PRESSURE: 80 MMHG | RESPIRATION RATE: 18 BRPM | SYSTOLIC BLOOD PRESSURE: 130 MMHG | BODY MASS INDEX: 45.63 KG/M2 | HEIGHT: 66 IN | WEIGHT: 283.94 LBS | HEART RATE: 87 BPM | OXYGEN SATURATION: 99 %

## 2021-08-19 DIAGNOSIS — Z01.818 PREOP EXAMINATION: Primary | ICD-10-CM

## 2021-08-19 PROCEDURE — 99214 PR OFFICE/OUTPT VISIT, EST, LEVL IV, 30-39 MIN: ICD-10-PCS | Mod: S$GLB,,, | Performed by: REGISTERED NURSE

## 2021-08-19 PROCEDURE — 99214 OFFICE O/P EST MOD 30 MIN: CPT | Mod: S$GLB,,, | Performed by: REGISTERED NURSE

## 2021-08-19 PROCEDURE — 99999 PR PBB SHADOW E&M-EST. PATIENT-LVL III: CPT | Mod: PBBFAC,,, | Performed by: REGISTERED NURSE

## 2021-08-19 PROCEDURE — 99999 PR PBB SHADOW E&M-EST. PATIENT-LVL III: ICD-10-PCS | Mod: PBBFAC,,, | Performed by: REGISTERED NURSE

## 2021-08-19 RX ORDER — VIT C/E/ZN/COPPR/LUTEIN/ZEAXAN 250MG-90MG
CAPSULE ORAL
COMMUNITY
End: 2021-09-29

## 2021-08-20 ENCOUNTER — TELEPHONE (OUTPATIENT)
Dept: FAMILY MEDICINE | Facility: CLINIC | Age: 36
End: 2021-08-20

## 2021-09-22 ENCOUNTER — HOSPITAL ENCOUNTER (OUTPATIENT)
Dept: RADIOLOGY | Facility: HOSPITAL | Age: 36
Discharge: HOME | End: 2021-09-22
Attending: REGISTERED NURSE
Payer: COMMERCIAL

## 2021-09-22 ENCOUNTER — OFFICE VISIT (OUTPATIENT)
Dept: FAMILY MEDICINE | Facility: CLINIC | Age: 36
End: 2021-09-22
Payer: COMMERCIAL

## 2021-09-22 VITALS
RESPIRATION RATE: 18 BRPM | OXYGEN SATURATION: 100 % | SYSTOLIC BLOOD PRESSURE: 118 MMHG | WEIGHT: 256.38 LBS | BODY MASS INDEX: 41.2 KG/M2 | DIASTOLIC BLOOD PRESSURE: 76 MMHG | HEIGHT: 66 IN | HEART RATE: 77 BPM | TEMPERATURE: 98 F

## 2021-09-22 DIAGNOSIS — M79.645 FINGER PAIN, LEFT: Primary | ICD-10-CM

## 2021-09-22 DIAGNOSIS — M79.645 FINGER PAIN, LEFT: ICD-10-CM

## 2021-09-22 PROCEDURE — 73140 X-RAY EXAM OF FINGER(S): CPT | Mod: TC,FY,PO,LT

## 2021-09-22 PROCEDURE — 99213 PR OFFICE/OUTPT VISIT, EST, LEVL III, 20-29 MIN: ICD-10-PCS | Mod: S$GLB,,, | Performed by: REGISTERED NURSE

## 2021-09-22 PROCEDURE — 99999 PR PBB SHADOW E&M-EST. PATIENT-LVL III: CPT | Mod: PBBFAC,,, | Performed by: REGISTERED NURSE

## 2021-09-22 PROCEDURE — 73140 X-RAY EXAM OF FINGER(S): CPT | Mod: 26,LT,, | Performed by: RADIOLOGY

## 2021-09-22 PROCEDURE — 99999 PR PBB SHADOW E&M-EST. PATIENT-LVL III: ICD-10-PCS | Mod: PBBFAC,,, | Performed by: REGISTERED NURSE

## 2021-09-22 PROCEDURE — 99213 OFFICE O/P EST LOW 20 MIN: CPT | Mod: S$GLB,,, | Performed by: REGISTERED NURSE

## 2021-09-22 PROCEDURE — 73140 XR FINGER 2 OR MORE VIEWS LEFT: ICD-10-PCS | Mod: 26,LT,, | Performed by: RADIOLOGY

## 2021-09-22 RX ORDER — DICLOFENAC SODIUM 16.05 MG/ML
5 SOLUTION TOPICAL 3 TIMES DAILY PRN
Qty: 150 ML | Refills: 1 | Status: SHIPPED | OUTPATIENT
Start: 2021-09-22 | End: 2021-09-29

## 2021-09-28 ENCOUNTER — TELEPHONE (OUTPATIENT)
Dept: FAMILY MEDICINE | Facility: CLINIC | Age: 36
End: 2021-09-28

## 2021-09-28 DIAGNOSIS — Z91.018 FOOD ALLERGY: Primary | ICD-10-CM

## 2021-09-29 ENCOUNTER — LAB VISIT (OUTPATIENT)
Dept: LAB | Facility: HOSPITAL | Age: 36
End: 2021-09-29
Attending: ALLERGY & IMMUNOLOGY
Payer: COMMERCIAL

## 2021-09-29 ENCOUNTER — OFFICE VISIT (OUTPATIENT)
Dept: ALLERGY | Facility: CLINIC | Age: 36
End: 2021-09-29
Payer: COMMERCIAL

## 2021-09-29 VITALS
DIASTOLIC BLOOD PRESSURE: 82 MMHG | TEMPERATURE: 98 F | BODY MASS INDEX: 40.15 KG/M2 | HEART RATE: 68 BPM | WEIGHT: 249.81 LBS | HEIGHT: 66 IN | SYSTOLIC BLOOD PRESSURE: 115 MMHG

## 2021-09-29 DIAGNOSIS — J30.1 SEASONAL ALLERGIC RHINITIS DUE TO POLLEN: ICD-10-CM

## 2021-09-29 DIAGNOSIS — Z91.018 FOOD ALLERGY: Primary | ICD-10-CM

## 2021-09-29 DIAGNOSIS — Z91.018 FOOD ALLERGY: ICD-10-CM

## 2021-09-29 PROCEDURE — 99999 PR PBB SHADOW E&M-EST. PATIENT-LVL III: CPT | Mod: PBBFAC,,, | Performed by: ALLERGY & IMMUNOLOGY

## 2021-09-29 PROCEDURE — 99999 PR PBB SHADOW E&M-EST. PATIENT-LVL III: ICD-10-PCS | Mod: PBBFAC,,, | Performed by: ALLERGY & IMMUNOLOGY

## 2021-09-29 PROCEDURE — 82785 ASSAY OF IGE: CPT | Performed by: ALLERGY & IMMUNOLOGY

## 2021-09-29 PROCEDURE — 36415 COLL VENOUS BLD VENIPUNCTURE: CPT | Performed by: ALLERGY & IMMUNOLOGY

## 2021-09-29 PROCEDURE — 99204 OFFICE O/P NEW MOD 45 MIN: CPT | Mod: S$GLB,,, | Performed by: ALLERGY & IMMUNOLOGY

## 2021-09-29 PROCEDURE — 86003 ALLG SPEC IGE CRUDE XTRC EA: CPT | Performed by: ALLERGY & IMMUNOLOGY

## 2021-09-29 PROCEDURE — 99204 PR OFFICE/OUTPT VISIT, NEW, LEVL IV, 45-59 MIN: ICD-10-PCS | Mod: S$GLB,,, | Performed by: ALLERGY & IMMUNOLOGY

## 2021-09-29 PROCEDURE — 86003 ALLG SPEC IGE CRUDE XTRC EA: CPT | Mod: 59 | Performed by: ALLERGY & IMMUNOLOGY

## 2021-09-29 RX ORDER — GABAPENTIN 300 MG/1
300 CAPSULE ORAL 3 TIMES DAILY
COMMUNITY
Start: 2021-08-24 | End: 2021-09-29 | Stop reason: ALTCHOICE

## 2021-09-29 RX ORDER — EPINEPHRINE 0.3 MG/.3ML
1 INJECTION SUBCUTANEOUS ONCE
Qty: 2 DEVICE | Refills: 3 | Status: SHIPPED | OUTPATIENT
Start: 2021-09-29 | End: 2021-11-10 | Stop reason: SDUPTHER

## 2021-09-29 RX ORDER — POLYETHYLENE GLYCOL 3350 17 G/17G
17 POWDER, FOR SOLUTION ORAL DAILY
COMMUNITY
Start: 2021-08-24 | End: 2021-09-29 | Stop reason: ALTCHOICE

## 2021-09-29 RX ORDER — ONDANSETRON 4 MG/1
4 TABLET, ORALLY DISINTEGRATING ORAL EVERY 8 HOURS PRN
COMMUNITY
Start: 2021-08-24 | End: 2021-09-29 | Stop reason: ALTCHOICE

## 2021-09-30 ENCOUNTER — TELEPHONE (OUTPATIENT)
Dept: ALLERGY | Facility: CLINIC | Age: 36
End: 2021-09-30

## 2021-09-30 LAB — IGE SERPL-ACNC: 395 IU/ML (ref 0–100)

## 2021-10-01 LAB — AMER SYCAMORE IGE QN: <0.35 KU/L

## 2021-10-04 LAB
A ALTERNATA IGE QN: 0.25 KU/L
A FUMIGATUS IGE QN: 0.35 KU/L
ALLERGEN BOXELDER MAPLE TREE IGE: <0.1 KU/L
ALLERGEN CHAETOMIUM GLOBOSUM IGE: <0.1 KU/L
ALLERGEN MAPLE (BOX ELDER) CLASS: NORMAL
ALLERGEN MULBERRY CLASS: NORMAL
ALLERGEN MULBERRY TREE IGE: <0.1 KU/L
ALLERGEN WHITE ASH TREE IGE: <0.1 KU/L
ALLERGEN WHITE PINE TREE IGE: <0.1 KU/L
BAHIA GRASS IGE QN: 1.85 KU/L
BALD CYPRESS IGE QN: <0.1 KU/L
C HERBARUM IGE QN: 0.28 KU/L
C LUNATA IGE QN: 0.22 KU/L
CAT DANDER IGE QN: 1.03 KU/L
CATFISH IGE QN: <0.1 KU/L
CHAETOMIUM GLOB. CLASS: NORMAL
CLAM IGE QN: <0.1 KU/L
COCKLEBUR IGE QN: <0.1 KU/L
COCKSFOOT IGE QN: 2.19 KU/L
CODFISH IGE QN: <0.1 KU/L
COMMON RAGWEED IGE QN: <0.1 KU/L
COTTONWOOD IGE QN: 0.16 KU/L
CRAB IGE QN: <0.1 KU/L
D FARINAE IGE QN: 4.45 KU/L
D PTERONYSS IGE QN: 2.86 KU/L
DEPRECATED A ALTERNATA IGE RAST QL: ABNORMAL
DEPRECATED A FUMIGATUS IGE RAST QL: ABNORMAL
DEPRECATED BAHIA GRASS IGE RAST QL: ABNORMAL
DEPRECATED BALD CYPRESS IGE RAST QL: NORMAL
DEPRECATED C HERBARUM IGE RAST QL: ABNORMAL
DEPRECATED C LUNATA IGE RAST QL: ABNORMAL
DEPRECATED CAT DANDER IGE RAST QL: ABNORMAL
DEPRECATED CATFISH IGE RAST QL: NORMAL
DEPRECATED CLAM IGE RAST QL: NORMAL
DEPRECATED COCKLEBUR IGE RAST QL: NORMAL
DEPRECATED COCKSFOOT IGE RAST QL: ABNORMAL
DEPRECATED CODFISH IGE RAST QL: NORMAL
DEPRECATED COMMON RAGWEED IGE RAST QL: NORMAL
DEPRECATED COTTONWOOD IGE RAST QL: ABNORMAL
DEPRECATED CRAB IGE RAST QL: NORMAL
DEPRECATED D FARINAE IGE RAST QL: ABNORMAL
DEPRECATED D PTERONYSS IGE RAST QL: ABNORMAL
DEPRECATED DOG DANDER IGE RAST QL: ABNORMAL
DEPRECATED ELDER IGE RAST QL: NORMAL
DEPRECATED ENGL PLANTAIN IGE RAST QL: ABNORMAL
DEPRECATED GUM-TREE IGE RAST QL: NORMAL
DEPRECATED HACKBERRY TREE IGE RAST QL: NORMAL
DEPRECATED JOHNSON GRASS IGE RAST QL: ABNORMAL
DEPRECATED KENT BLUE GRASS IGE RAST QL: ABNORMAL
DEPRECATED LOBSTER IGE RAST QL: NORMAL
DEPRECATED LONDON PLANE IGE RAST QL: ABNORMAL
DEPRECATED MUGWORT IGE RAST QL: NORMAL
DEPRECATED NETTLE IGE RAST QL: ABNORMAL
DEPRECATED OYSTER IGE RAST QL: NORMAL
DEPRECATED PECAN/HICK TREE IGE RAST QL: ABNORMAL
DEPRECATED PER RYE GRASS IGE RAST QL: ABNORMAL
DEPRECATED PRIVET IGE RAST QL: NORMAL
DEPRECATED RED TOP GRASS IGE RAST QL: ABNORMAL
DEPRECATED ROACH IGE RAST QL: ABNORMAL
DEPRECATED SALMON IGE RAST QL: NORMAL
DEPRECATED SALTWORT IGE RAST QL: ABNORMAL
DEPRECATED SHEEP SORREL IGE RAST QL: NORMAL
DEPRECATED SHRIMP IGE RAST QL: ABNORMAL
DEPRECATED SILVER BIRCH IGE RAST QL: NORMAL
DEPRECATED TIMOTHY IGE RAST QL: ABNORMAL
DEPRECATED TUNA IGE RAST QL: NORMAL
DEPRECATED WHITE OAK IGE RAST QL: ABNORMAL
DEPRECATED WILLOW IGE RAST QL: ABNORMAL
DOG DANDER IGE QN: 0.34 KU/L
ELDER IGE QN: <0.1 KU/L
ELM CEDAR CLASS: NORMAL
ELM CEDAR, IGE: <0.1 KU/L
ENGL PLANTAIN IGE QN: 0.11 KU/L
GUM-TREE IGE QN: <0.1 KU/L
HACKBERRY TREE IGE QN: 0.1 KU/L
JOHNSON GRASS IGE QN: 0.98 KU/L
KENT BLUE GRASS IGE QN: 2.86 KU/L
LOBSTER IGE QN: <0.1 KU/L
LONDON PLANE IGE QN: 0.12 KU/L
MUGWORT IGE QN: 0.1 KU/L
NETTLE IGE QN: 0.16 KU/L
OYSTER IGE QN: <0.1 KU/L
PECAN/HICK TREE IGE QN: 0.75 KU/L
PER RYE GRASS IGE QN: 2.23 KU/L
PRIVET IGE QN: <0.1 KU/L
RED TOP GRASS IGE QN: 2.27 KU/L
ROACH IGE QN: 0.12 KU/L
SALMON IGE QN: <0.1 KU/L
SALTWORT IGE QN: 0.11 KU/L
SHEEP SORREL IGE QN: <0.1 KU/L
SHRIMP IGE QN: 0.21 KU/L
SILVER BIRCH IGE QN: <0.1 KU/L
STEMPHYLIUM HERBARUM CLASS: ABNORMAL
STEMPHYLLIUM, IGE: 0.24 KU/L
TIMOTHY IGE QN: 2.69 KU/L
TUNA IGE QN: <0.1 KU/L
WHITE ASH CLASS: NORMAL
WHITE OAK IGE QN: 0.13 KU/L
WHITE PINE CLASS: NORMAL
WILLOW IGE QN: 0.11 KU/L

## 2021-10-11 ENCOUNTER — IMMUNIZATION (OUTPATIENT)
Dept: FAMILY MEDICINE | Facility: CLINIC | Age: 36
End: 2021-10-11
Payer: COMMERCIAL

## 2021-10-11 PROCEDURE — 90686 IIV4 VACC NO PRSV 0.5 ML IM: CPT | Mod: S$GLB,,, | Performed by: REGISTERED NURSE

## 2021-10-11 PROCEDURE — 90471 FLU VACCINE (QUAD) GREATER THAN OR EQUAL TO 3YO PRESERVATIVE FREE IM: ICD-10-PCS | Mod: S$GLB,,, | Performed by: REGISTERED NURSE

## 2021-10-11 PROCEDURE — 90471 IMMUNIZATION ADMIN: CPT | Mod: S$GLB,,, | Performed by: REGISTERED NURSE

## 2021-10-11 PROCEDURE — 90686 FLU VACCINE (QUAD) GREATER THAN OR EQUAL TO 3YO PRESERVATIVE FREE IM: ICD-10-PCS | Mod: S$GLB,,, | Performed by: REGISTERED NURSE

## 2021-10-13 ENCOUNTER — TELEPHONE (OUTPATIENT)
Dept: ALLERGY | Facility: CLINIC | Age: 36
End: 2021-10-13

## 2021-11-09 ENCOUNTER — TELEPHONE (OUTPATIENT)
Dept: FAMILY MEDICINE | Facility: CLINIC | Age: 36
End: 2021-11-09
Payer: COMMERCIAL

## 2021-11-10 ENCOUNTER — OFFICE VISIT (OUTPATIENT)
Dept: ALLERGY | Facility: CLINIC | Age: 36
End: 2021-11-10
Payer: COMMERCIAL

## 2021-11-10 VITALS
TEMPERATURE: 98 F | HEART RATE: 65 BPM | BODY MASS INDEX: 40.04 KG/M2 | SYSTOLIC BLOOD PRESSURE: 127 MMHG | WEIGHT: 249.13 LBS | HEIGHT: 66 IN | DIASTOLIC BLOOD PRESSURE: 81 MMHG

## 2021-11-10 DIAGNOSIS — J30.89 ALLERGIC RHINITIS DUE TO MOLD: ICD-10-CM

## 2021-11-10 DIAGNOSIS — Z91.018 FOOD ALLERGY: ICD-10-CM

## 2021-11-10 DIAGNOSIS — J30.89 ALLERGIC RHINITIS DUE TO AMERICAN HOUSE DUST MITE: ICD-10-CM

## 2021-11-10 DIAGNOSIS — J30.81 ALLERGIC RHINITIS DUE TO ANIMAL (CAT) (DOG) HAIR AND DANDER: ICD-10-CM

## 2021-11-10 DIAGNOSIS — J30.1 SEASONAL ALLERGIC RHINITIS DUE TO POLLEN: Primary | ICD-10-CM

## 2021-11-10 PROCEDURE — 95004 PR ALLERGY SKIN TESTS,ALLERGENS: ICD-10-PCS | Mod: S$GLB,,, | Performed by: ALLERGY & IMMUNOLOGY

## 2021-11-10 PROCEDURE — 99999 PR PBB SHADOW E&M-EST. PATIENT-LVL III: CPT | Mod: PBBFAC,,, | Performed by: ALLERGY & IMMUNOLOGY

## 2021-11-10 PROCEDURE — 99999 PR PBB SHADOW E&M-EST. PATIENT-LVL III: ICD-10-PCS | Mod: PBBFAC,,, | Performed by: ALLERGY & IMMUNOLOGY

## 2021-11-10 PROCEDURE — 99214 PR OFFICE/OUTPT VISIT, EST, LEVL IV, 30-39 MIN: ICD-10-PCS | Mod: 25,S$GLB,, | Performed by: ALLERGY & IMMUNOLOGY

## 2021-11-10 PROCEDURE — 99214 OFFICE O/P EST MOD 30 MIN: CPT | Mod: 25,S$GLB,, | Performed by: ALLERGY & IMMUNOLOGY

## 2021-11-10 PROCEDURE — 95004 PERQ TESTS W/ALRGNC XTRCS: CPT | Mod: S$GLB,,, | Performed by: ALLERGY & IMMUNOLOGY

## 2021-11-10 RX ORDER — EPINEPHRINE 0.3 MG/.3ML
1 INJECTION SUBCUTANEOUS ONCE
Qty: 2 EACH | Refills: 3 | Status: SHIPPED | OUTPATIENT
Start: 2021-11-10 | End: 2023-06-28 | Stop reason: SDUPTHER

## 2021-11-15 ENCOUNTER — PATIENT MESSAGE (OUTPATIENT)
Dept: FAMILY MEDICINE | Facility: CLINIC | Age: 36
End: 2021-11-15
Payer: COMMERCIAL

## 2021-11-18 ENCOUNTER — TELEPHONE (OUTPATIENT)
Dept: FAMILY MEDICINE | Facility: CLINIC | Age: 36
End: 2021-11-18
Payer: COMMERCIAL

## 2021-11-29 ENCOUNTER — TELEPHONE (OUTPATIENT)
Dept: FAMILY MEDICINE | Facility: CLINIC | Age: 36
End: 2021-11-29
Payer: COMMERCIAL

## 2021-11-30 ENCOUNTER — OFFICE VISIT (OUTPATIENT)
Dept: FAMILY MEDICINE | Facility: CLINIC | Age: 36
End: 2021-11-30
Payer: COMMERCIAL

## 2021-11-30 ENCOUNTER — TELEPHONE (OUTPATIENT)
Dept: FAMILY MEDICINE | Facility: CLINIC | Age: 36
End: 2021-11-30
Payer: COMMERCIAL

## 2021-11-30 DIAGNOSIS — Z71.82 EXERCISE COUNSELING: Primary | ICD-10-CM

## 2021-11-30 DIAGNOSIS — Z98.84 HISTORY OF BARIATRIC SURGERY: ICD-10-CM

## 2021-11-30 PROCEDURE — 99441 PR PHYSICIAN TELEPHONE EVALUATION 5-10 MIN: ICD-10-PCS | Mod: 95,,, | Performed by: REGISTERED NURSE

## 2021-11-30 PROCEDURE — 99441 PR PHYSICIAN TELEPHONE EVALUATION 5-10 MIN: CPT | Mod: 95,,, | Performed by: REGISTERED NURSE

## 2021-12-06 ENCOUNTER — IMMUNIZATION (OUTPATIENT)
Dept: PRIMARY CARE CLINIC | Facility: CLINIC | Age: 36
End: 2021-12-06
Payer: COMMERCIAL

## 2021-12-06 DIAGNOSIS — Z23 NEED FOR VACCINATION: Primary | ICD-10-CM

## 2021-12-06 PROCEDURE — 0064A COVID-19, MRNA, LNP-S, PF, 100 MCG/0.25 ML DOSE VACCINE (MODERNA BOOSTER): CPT | Mod: CV19,PBBFAC | Performed by: FAMILY MEDICINE

## 2022-01-20 ENCOUNTER — TELEPHONE (OUTPATIENT)
Dept: FAMILY MEDICINE | Facility: CLINIC | Age: 37
End: 2022-01-20
Payer: COMMERCIAL

## 2022-01-20 NOTE — TELEPHONE ENCOUNTER
I'm assuming she quarantined due to covid exposure ????  Does not necessarily need to do anything as long as she has no symptoms and quarantine is up.

## 2022-01-20 NOTE — TELEPHONE ENCOUNTER
----- Message from Harshad Oliver MA sent at 1/20/2022  9:00 AM CST -----  Contact: self  Spoke with pt who says she has no covid symptoms and her 5 day quarantine is over. Pt is confused as to whether she needs an appointment with you or not, since she has no primary care doctor.  ----- Message -----  From: Patrick De La Rosa  Sent: 1/20/2022   8:53 AM CST  To: Susy RIZO Staff    Pt would like to consult with nurse regarding post covid instructions.  Please contact Marco Antonio Cisse @ [phone].  Thanks/As

## 2022-01-24 ENCOUNTER — TELEPHONE (OUTPATIENT)
Dept: FAMILY MEDICINE | Facility: CLINIC | Age: 37
End: 2022-01-24

## 2022-01-24 ENCOUNTER — PATIENT MESSAGE (OUTPATIENT)
Dept: FAMILY MEDICINE | Facility: CLINIC | Age: 37
End: 2022-01-24
Payer: COMMERCIAL

## 2022-01-24 NOTE — TELEPHONE ENCOUNTER
Spoke with pt and pt states that she has been itching all over for the last 4 days. Pt states that she does not know why she has not changed anything. Pt states that she does not want to continue to take benadryl because it makes her sleepy. Pt would like something called in and pt does not mind coming in if she has too. Please advise

## 2022-01-24 NOTE — TELEPHONE ENCOUNTER
Can try fexofenadine 180 mg daily in AM  Most rx anti-histamines will cause drowsiness.  May be able to take Benedryl at night.

## 2022-01-25 NOTE — PROGRESS NOTES
Subjective:       Patient ID: Marco Antonio Cisse is a 36 y.o. female.      Chief Complaint   Patient presents with    Skin itching       HPI    Marco Antonio is here for skin itching all over x 5 days.  No skin rash or hives.  She does mention changing her dryer sheets to a fragranced type.  No new foods or other allergens identified.  Treating with Benedryl, variable results, extra sleepy.      Review of Systems - History obtained from the patient  Dermatological ROS: positive for pruritus  negative for dry skin, eczema and rash      Review of patient's allergies indicates:  No Known Allergies      Patient Active Problem List   Diagnosis    Migraine headache    Hypermenorrhea    Vitamin D deficiency disease    Insulin resistance    Irritable bowel syndrome (IBS)    Fluid retention    Panic anxiety syndrome           Current Outpatient Medications:     albuterol (PROVENTIL/VENTOLIN HFA) 90 mcg/actuation inhaler, Inhale 2 puffs into the lungs every 4 to 6 hours as needed for Wheezing., Disp: 18 g, Rfl: 3    EPINEPHrine (EPIPEN) 0.3 mg/0.3 mL AtIn, Inject 0.3 mLs (0.3 mg total) into the muscle once. for 1 dose, Disp: 2 each, Rfl: 3    Medications have been reviewed and reconciled.        Past medical, surgical, family and social histories have been reviewed today.      Objective:     Vitals:    01/26/22 1351   BP: 126/78   Pulse: 76   Temp: 97.6 °F (36.4 °C)       Physical Exam  Constitutional:       General: She is not in acute distress.  HENT:      Head: Normocephalic and atraumatic.   Skin:     General: Skin is warm and dry.      Findings: No erythema or rash.   Neurological:      Mental Status: She is alert and oriented to person, place, and time.           Assessment       ICD-10-CM ICD-9-CM    1. Pruritus of skin  L29.9 698.9 methylPREDNISolone (MEDROL DOSEPACK) 4 mg tablet  Exact trigger unclear, possibly changing to fragranced dryer sheets.  Steroid pack as ordered.  Fexofenadine 180 mg daily in  ELISEO, Benedryl nightly prn.  Skin care discussed.        Follow-up     Contact office back in 2 to 3 days if worse or no better.  Return to clinic as needed.      ADALBERTO Chavez  [facility]

## 2022-01-26 ENCOUNTER — OFFICE VISIT (OUTPATIENT)
Dept: FAMILY MEDICINE | Facility: CLINIC | Age: 37
End: 2022-01-26
Payer: COMMERCIAL

## 2022-01-26 VITALS
TEMPERATURE: 98 F | BODY MASS INDEX: 36 KG/M2 | WEIGHT: 224 LBS | DIASTOLIC BLOOD PRESSURE: 78 MMHG | SYSTOLIC BLOOD PRESSURE: 126 MMHG | HEART RATE: 76 BPM | HEIGHT: 66 IN | OXYGEN SATURATION: 100 %

## 2022-01-26 DIAGNOSIS — L29.9 PRURITUS OF SKIN: Primary | ICD-10-CM

## 2022-01-26 PROCEDURE — 99213 PR OFFICE/OUTPT VISIT, EST, LEVL III, 20-29 MIN: ICD-10-PCS | Mod: S$GLB,,, | Performed by: REGISTERED NURSE

## 2022-01-26 PROCEDURE — 99213 OFFICE O/P EST LOW 20 MIN: CPT | Mod: S$GLB,,, | Performed by: REGISTERED NURSE

## 2022-01-26 PROCEDURE — 99999 PR PBB SHADOW E&M-EST. PATIENT-LVL IV: CPT | Mod: PBBFAC,,, | Performed by: REGISTERED NURSE

## 2022-01-26 PROCEDURE — 99999 PR PBB SHADOW E&M-EST. PATIENT-LVL IV: ICD-10-PCS | Mod: PBBFAC,,, | Performed by: REGISTERED NURSE

## 2022-01-26 RX ORDER — METHYLPREDNISOLONE 4 MG/1
TABLET ORAL
Qty: 1 EACH | Refills: 0 | Status: SHIPPED | OUTPATIENT
Start: 2022-01-26 | End: 2022-06-08

## 2022-01-26 NOTE — PATIENT INSTRUCTIONS
Patient Education       Itchy Skin   About this topic   Itch is also known as pruritus. It is an unpleasant feeling. You may want to scratch the skin to get relief. Itchy skin is annoying and may cause stress. If you itch and are unable to do activities, you may have anxiety or low mood. Scratching all the time can harm the skin. You may have just one area that feels itchy or it may be over your whole body.  Most often your skin may itch because it is dry. It may also be caused by a much more serious health problem.  What are the causes?   Many things can case your skin to itch. You may have:  · Dry skin  · Allergy to metal or something that touches your skin  · Problem with your skin like hives, psoriasis, or eczema  · Infection like thrush or scabies  · Health problem like diabetes, change in hormones, or kidney problem  What are the main signs?   You may not see any changes on the skin. Sometimes, your skin is dry and red. You may or may not have a rash.  How does the doctor diagnose this health problem?   Your doctor will take your history and do an exam. The doctor will look at your skin with care. You may need tests to see if there is a problem that causes your skin to itch. The doctor may order tests like:  · Blood tests  · Skin biopsy  · Chest x-ray  How does the doctor treat this health problem?   Your care will be based on what is causing the itching. Your doctor may order drugs or wet dressings. In some cases, the doctor may order a procedure like light therapy. They will use a special kind of light on your skin. This may help ease some of the itch. You may need to continue to have treatment to keep the itch under control.  What lifestyle changes are needed?   · Avoid foods that may make your skin itch.  · Take extra care when you take a bath or shower.  ? Use lukewarm water and limit bath or shower time to 5 to 10 minutes.  ? Use only mild and unscented soaps and bath products.  ? Apply products that  moisturize your skin.  · Stay away from things that bother your skin. These may include household , detergents, aftershave lotions, gasoline, and other liquids.  · Avoid clothes that fit too tight or are made of wool or synthetic fabrics.  · Do not scratch or rub the skin. Try to be gentle and pat or tap the skin instead.  · Keep your fingernails trimmed and short to avoid damage to the skin.  · Use a cool compress on the skin. Dip a cloth in cold water and apply it directly to your skin. This may help it to not swell and itch.  · Avoid heat and humidity for long periods.  What drugs may be needed?   The doctor may order drugs to:  · Control and ease itching  · Help the skin to not swell and lower the redness  Where can I learn more?   [facility] Choices  http://www.nhs.uk/Conditions/Itching/Pages/Introduction.aspx   Last Reviewed Date   2021-08-16  Consumer Information Use and Disclaimer   This information is not specific medical advice and does not replace information you receive from your health care provider. This is only a brief summary of general information. It does NOT include all information about conditions, illnesses, injuries, tests, procedures, treatments, therapies, discharge instructions or life-style choices that may apply to you. You must talk with your health care provider for complete information about your health and treatment options. This information should not be used to decide whether or not to accept your health care providers advice, instructions or recommendations. Only your health care provider has the knowledge and training to provide advice that is right for you.  Copyright   Copyright © 2021 [employer] and its affiliates and/or licensors. All rights reserved.

## 2022-02-07 ENCOUNTER — TELEPHONE (OUTPATIENT)
Dept: FAMILY MEDICINE | Facility: CLINIC | Age: 37
End: 2022-02-07
Payer: COMMERCIAL

## 2022-02-07 DIAGNOSIS — Z01.84 IMMUNITY STATUS TESTING: Primary | ICD-10-CM

## 2022-02-10 NOTE — TELEPHONE ENCOUNTER
In looking at her shot record, it shows she is due for MMR and Hep-B, along with Varivax.  She has likely had these vaccines, just don't have dates.  But, school either needs date shot given or titer.  This is a lab draw that tells us if she has immunity to certain virus. If negative, then will need to be vaccinated.  Titer orders are in, she can come whenever she can, does not need to be done fasting.

## 2022-03-11 ENCOUNTER — NURSE TRIAGE (OUTPATIENT)
Dept: ADMINISTRATIVE | Facility: CLINIC | Age: 37
End: 2022-03-11
Payer: COMMERCIAL

## 2022-03-11 NOTE — TELEPHONE ENCOUNTER
"Pt contacted [facility]. Pt states Wednesday she began having chest pain. She thought it was heartburn so she took some medication she was given when she had her bypass surgery last year. Couldn't remember the name of medication but it started with a "P" and was 40 mg. It is not in her medication list in chart but was given to her by Dr. Henriquez who performed her bypass surgery. CP continued into yesterday and today pt states it has moved into her left arm. Pt states the pain comes and goes but it lasts for more than 5 minutes when it is there. Denies it feeling like a crushing or pressure-like pain. Pt denies severe difficulty breathing or passing out. Due to CP and cardiac risk factors recommendation is to call 911. Pt verbalized understanding and will call. Gave number to [facility] for further concerns.    Reason for Disposition   Chest pain lasting longer than 5 minutes and ANY of the following:* Over 44 years old* Over 30 years old and at least one cardiac risk factor (e.g., diabetes mellitus, high blood pressure, high cholesterol, smoker, or strong family history of heart disease)* History of heart disease (i.e., angina, heart attack, heart failure, bypass surgery, takes nitroglycerin)* Pain is crushing, pressure-like, or heavy    Additional Information   Negative: SEVERE difficulty breathing (e.g., struggling for each breath, speaks in single words)   Negative: Passed out (i.e., fainted, collapsed and was not responding)   Negative: Difficult to awaken or acting confused (e.g., disoriented, slurred speech)   Negative: Shock suspected (e.g., cold/pale/clammy skin, too weak to stand, low BP, rapid pulse)    Protocols used: CHEST PAIN-A-OH      "

## 2022-03-22 ENCOUNTER — PATIENT MESSAGE (OUTPATIENT)
Dept: RESEARCH | Facility: HOSPITAL | Age: 37
End: 2022-03-22
Payer: COMMERCIAL

## 2022-03-29 ENCOUNTER — TELEPHONE (OUTPATIENT)
Dept: FAMILY MEDICINE | Facility: CLINIC | Age: 37
End: 2022-03-29

## 2022-03-29 NOTE — TELEPHONE ENCOUNTER
Spoke with pt and advised her she still has to come in to get labs drawn. Five antibody labs in the active requests. Pt will be coming in this week or next week to get those drawn, so then we can order and schedule her to get shots she needs.

## 2022-03-29 NOTE — TELEPHONE ENCOUNTER
----- Message from Ruchi Bazzi sent at 3/29/2022  1:47 PM CDT -----  Contact: Markatelin Bernard is needing a call back to schedule a nurse visit for the vaccines she is missing. Please call her back at [phone]

## 2022-05-09 ENCOUNTER — TELEPHONE (OUTPATIENT)
Dept: FAMILY MEDICINE | Facility: CLINIC | Age: 37
End: 2022-05-09

## 2022-05-09 DIAGNOSIS — T75.3XXA MOTION SICKNESS, INITIAL ENCOUNTER: Primary | ICD-10-CM

## 2022-05-09 RX ORDER — SCOLOPAMINE TRANSDERMAL SYSTEM 1 MG/1
1 PATCH, EXTENDED RELEASE TRANSDERMAL
Qty: 1 PATCH | Refills: 0 | Status: SHIPPED | OUTPATIENT
Start: 2022-05-09 | End: 2022-06-08

## 2022-05-09 NOTE — TELEPHONE ENCOUNTER
----- Message from Griselda Owens sent at 5/9/2022  1:04 PM CDT -----  States she is going on a cruise. She would like get the motion patch called in. Please call pt [phone].  Thank you

## 2022-05-16 ENCOUNTER — PATIENT MESSAGE (OUTPATIENT)
Dept: FAMILY MEDICINE | Facility: CLINIC | Age: 37
End: 2022-05-16
Payer: COMMERCIAL

## 2022-06-06 ENCOUNTER — TELEPHONE (OUTPATIENT)
Dept: FAMILY MEDICINE | Facility: CLINIC | Age: 37
End: 2022-06-06

## 2022-06-06 NOTE — TELEPHONE ENCOUNTER
----- Message from Don Snow sent at 6/6/2022  4:29 PM CDT -----  Contact: Ojeauoeh-[phone]  Marco Antonio is requesting a callback as soon as possible.  She has been having a really bad migraine for the past 3 days and would like to be advised if she can see the doctor.    Callback number: [phone]

## 2022-06-06 NOTE — PROGRESS NOTES
Subjective:       Patient ID: Marco Antonio Cisse is a 36 y.o. Black or  female here today for:  Migraine      The patient's last visit with me was on 1/26/2022.    HPI     Marco Antonio is here with c/o persistent migraine headache x 1 week.  Mother with h/o brain aneurysm.  Denies dizziness or blurred vision.  Thinks may be stress-related, family members ill/death.  Son with school issues.  Did take a cruise at end of May, no HA pain or stress reported while on trip.  Found out about her ill family member when she got home and HA since that time.  Will be her 1st Father's Day without her Dad.  Reports neck pain with muscle tension.  Treating HA pain with Tylenol, not helping.      Review of Systems   Constitutional: Negative.    Eyes: Negative for photophobia, pain and visual disturbance.   Respiratory: Negative.    Cardiovascular: Negative.    Musculoskeletal: Positive for neck pain. Negative for gait problem, joint swelling and neck stiffness.   Neurological: Positive for headaches. Negative for tremors, syncope, weakness and light-headedness.   Psychiatric/Behavioral: Positive for agitation, decreased concentration and sleep disturbance. Negative for dysphoric mood, hallucinations, self-injury and suicidal ideas. The patient is nervous/anxious. The patient is not hyperactive.        Review of patient's allergies indicates:  No Known Allergies    Patient Active Problem List   Diagnosis    Migraine headache    Hypermenorrhea    Vitamin D deficiency disease    Insulin resistance    Irritable bowel syndrome (IBS)    Fluid retention    Panic anxiety syndrome         Current Outpatient Medications:     albuterol (PROVENTIL/VENTOLIN HFA) 90 mcg/actuation inhaler, INHALE 2 PUFFS INTO THE LUNGS EVERY 4 TO 6 HOURS AS NEEDED FOR WHEEZING, Disp: 8.5 g, Rfl: 2    EPINEPHrine (EPIPEN) 0.3 mg/0.3 mL AtIn, Inject 0.3 mLs (0.3 mg total) into the muscle once. for 1 dose, Disp: 2 each, Rfl: 3      Past  "medical, surgical, family and social histories have been reviewed today.      Objective:     Vitals:    06/09/22 0830 06/09/22 0938   BP: (!) 114/102 110/82   BP Location: Right arm Right arm   Patient Position: Sitting Sitting   BP Method: Medium (Manual) Medium (Manual)   Pulse: 74    Temp: 97.2 °F (36.2 °C)    SpO2: 99%    Weight: 95.4 kg (210 lb 5.1 oz)    Height: 5' 6" (1.676 m)        Physical Exam  Vitals reviewed.   Constitutional:       General: She is not in acute distress.  HENT:      Head: Normocephalic and atraumatic.   Eyes:      Extraocular Movements: Extraocular movements intact.      Pupils: Pupils are equal, round, and reactive to light.   Neck:      Vascular: No carotid bruit.   Cardiovascular:      Rate and Rhythm: Normal rate and regular rhythm.      Pulses: Normal pulses.      Heart sounds: Normal heart sounds. No murmur heard.    No gallop.   Pulmonary:      Effort: Pulmonary effort is normal.      Breath sounds: Normal breath sounds.   Musculoskeletal:         General: No swelling, tenderness or deformity. Normal range of motion.      Cervical back: Normal range of motion and neck supple. No rigidity. No muscular tenderness.   Skin:     General: Skin is warm and dry.      Capillary Refill: Capillary refill takes less than 2 seconds.      Findings: No rash.   Neurological:      General: No focal deficit present.      Mental Status: She is alert and oriented to person, place, and time. Mental status is at baseline.      Cranial Nerves: No cranial nerve deficit.      Sensory: No sensory deficit.      Motor: No weakness.      Coordination: Coordination normal.      Gait: Gait normal.      Deep Tendon Reflexes: Reflexes normal.   Psychiatric:         Attention and Perception: Attention and perception normal.         Mood and Affect: Affect is flat.         Speech: Speech normal.         Behavior: Behavior normal.         Thought Content: Thought content normal.         Judgment: Judgment normal. "         Diagnosis/Assessment:     1. Adjustment disorder with anxious mood --- uncontrolled, causing HA and muscle tension, elev bp.  CBT if needed.  Trial of Zoloft, monitor.  - sertraline (ZOLOFT) 25 MG tablet; Take 1 tablet (25 mg total) by mouth once daily.  Dispense: 30 tablet; Refill: 6    2. Elevated blood pressure, situational --- triggered by stress, causing headache.       Follow-up:     See me in 2 weeks for recheck, or sooner if needed.  RTC as directed or on prn basis.        ADALBERTO Chavez  [facility]       Patient Care Team:  Randal Jain NP as PCP - General (Family Medicine)  Severo Gonzalez MD as Obstetrician (Obstetrics)  Destini Lawson RD as Dietitian (Endocrinology)         30 minutes of total time spent on the encounter, which includes face to face time and non-face to face time preparing to see the patient (eg, review of tests), obtaining and/or reviewing separately obtained history, and documenting clinical information in the electronic or other health record.  Also includes independent interpretation of results (not separately reported) and communicating results to the patient/family/caregiver, with care coordination (not separately reported).

## 2022-06-09 ENCOUNTER — OFFICE VISIT (OUTPATIENT)
Dept: FAMILY MEDICINE | Facility: CLINIC | Age: 37
End: 2022-06-09
Payer: COMMERCIAL

## 2022-06-09 VITALS
SYSTOLIC BLOOD PRESSURE: 110 MMHG | HEART RATE: 74 BPM | BODY MASS INDEX: 33.8 KG/M2 | WEIGHT: 210.31 LBS | DIASTOLIC BLOOD PRESSURE: 82 MMHG | OXYGEN SATURATION: 99 % | HEIGHT: 66 IN | TEMPERATURE: 97 F

## 2022-06-09 DIAGNOSIS — R03.0 ELEVATED BLOOD PRESSURE, SITUATIONAL: ICD-10-CM

## 2022-06-09 DIAGNOSIS — F43.22 ADJUSTMENT DISORDER WITH ANXIOUS MOOD: Primary | ICD-10-CM

## 2022-06-09 PROCEDURE — 99999 PR PBB SHADOW E&M-EST. PATIENT-LVL IV: CPT | Mod: PBBFAC,,, | Performed by: REGISTERED NURSE

## 2022-06-09 PROCEDURE — 99999 PR PBB SHADOW E&M-EST. PATIENT-LVL IV: ICD-10-PCS | Mod: PBBFAC,,, | Performed by: REGISTERED NURSE

## 2022-06-09 PROCEDURE — 99214 OFFICE O/P EST MOD 30 MIN: CPT | Mod: S$GLB,,, | Performed by: REGISTERED NURSE

## 2022-06-09 PROCEDURE — 99214 PR OFFICE/OUTPT VISIT, EST, LEVL IV, 30-39 MIN: ICD-10-PCS | Mod: S$GLB,,, | Performed by: REGISTERED NURSE

## 2022-06-09 RX ORDER — SERTRALINE HYDROCHLORIDE 25 MG/1
25 TABLET, FILM COATED ORAL DAILY
Qty: 30 TABLET | Refills: 6 | Status: SHIPPED | OUTPATIENT
Start: 2022-06-09 | End: 2023-06-28 | Stop reason: SDUPTHER

## 2022-06-09 RX ORDER — PANTOPRAZOLE SODIUM 40 MG/1
40 TABLET, DELAYED RELEASE ORAL DAILY
COMMUNITY
Start: 2022-06-06 | End: 2022-12-20

## 2022-07-28 ENCOUNTER — OFFICE VISIT (OUTPATIENT)
Dept: FAMILY MEDICINE | Facility: CLINIC | Age: 37
End: 2022-07-28
Payer: COMMERCIAL

## 2022-07-28 VITALS
TEMPERATURE: 98 F | DIASTOLIC BLOOD PRESSURE: 70 MMHG | HEART RATE: 80 BPM | BODY MASS INDEX: 33.73 KG/M2 | SYSTOLIC BLOOD PRESSURE: 110 MMHG | WEIGHT: 209 LBS | RESPIRATION RATE: 16 BRPM

## 2022-07-28 DIAGNOSIS — L08.9 FINGER INFECTION: Primary | ICD-10-CM

## 2022-07-28 PROCEDURE — 99999 PR PBB SHADOW E&M-EST. PATIENT-LVL IV: CPT | Mod: PBBFAC,,, | Performed by: INTERNAL MEDICINE

## 2022-07-28 PROCEDURE — 99213 PR OFFICE/OUTPT VISIT, EST, LEVL III, 20-29 MIN: ICD-10-PCS | Mod: S$GLB,,, | Performed by: INTERNAL MEDICINE

## 2022-07-28 PROCEDURE — 99213 OFFICE O/P EST LOW 20 MIN: CPT | Mod: S$GLB,,, | Performed by: INTERNAL MEDICINE

## 2022-07-28 PROCEDURE — 99999 PR PBB SHADOW E&M-EST. PATIENT-LVL IV: ICD-10-PCS | Mod: PBBFAC,,, | Performed by: INTERNAL MEDICINE

## 2022-07-28 RX ORDER — DOXYCYCLINE HYCLATE 100 MG
100 TABLET ORAL 2 TIMES DAILY
Qty: 20 TABLET | Refills: 0 | Status: SHIPPED | OUTPATIENT
Start: 2022-07-28 | End: 2022-08-01

## 2022-07-28 RX ORDER — MUPIROCIN 20 MG/G
OINTMENT TOPICAL 2 TIMES DAILY PRN
Qty: 22 G | Refills: 0 | Status: SHIPPED | OUTPATIENT
Start: 2022-07-28 | End: 2024-01-11

## 2022-07-28 NOTE — PROGRESS NOTES
Subjective:       Patient ID: Marco Antonio Cisse is a 36 y.o. female.    Chief Complaint: Hand Pain    1 week---------left index finger-tender base of nail---seen at [facility] urgent care----given bactrim but makes her sick----------    Hand Pain   Pertinent negatives include no chest pain.     Past Medical History:   Diagnosis Date    IBS (irritable bowel syndrome)     Insulin resistance syndrome     Migraines     Vitamin D deficiency disease      Past Surgical History:   Procedure Laterality Date    COLONOSCOPY N/A 10/7/2015    Procedure: COLONOSCOPY;  Surgeon: Kris Barker MD;  Location: [facility];  Service: Endoscopy;  Laterality: N/A;    ENDOMETRIAL ABLATION      KELOID EXCISION      LAPAROSCOPIC SLEEVE GASTRECTOMY  08/25/2021    SHOULDER ARTHROSCOPY      TUBAL LIGATION       Family History   Problem Relation Age of Onset    Hypertension Mother     Arthritis Mother     Diabetes Father     Hypertension Father     Stroke Father     Colon polyps Father     Arthritis Maternal Aunt     Lupus Cousin     Cancer Neg Hx      Social History     Socioeconomic History    Marital status:     Number of children: 2   Occupational History    Occupation:      Employer: [employer]   Tobacco Use    Smoking status: Never Smoker    Smokeless tobacco: Never Used   Substance and Sexual Activity    Alcohol use: No     Alcohol/week: 0.0 standard drinks    Drug use: No    Sexual activity: Yes     Birth control/protection: Surgical     Review of Systems   Constitutional: Negative for chills and fever.   HENT: Negative.    Respiratory: Negative for apnea, cough, choking, chest tightness, shortness of breath, wheezing and stridor.    Cardiovascular: Negative for chest pain and palpitations.   Gastrointestinal: Negative for abdominal pain, nausea and vomiting.   Neurological: Negative for dizziness and weakness.   Psychiatric/Behavioral: Negative for agitation, behavioral  problems and confusion.       Objective:      Physical Exam  Vitals and nursing note reviewed.   Skin:     Comments: Tender distal left index finger-----   Neurological:      Mental Status: She is alert.         CMP  Sodium   Date Value Ref Range Status   11/28/2018 139 136 - 145 mmol/L Final     Potassium   Date Value Ref Range Status   11/28/2018 4.1 3.5 - 5.1 mmol/L Final     Chloride   Date Value Ref Range Status   11/28/2018 105 95 - 110 mmol/L Final     CO2   Date Value Ref Range Status   11/28/2018 27 23 - 29 mmol/L Final     Glucose   Date Value Ref Range Status   11/28/2018 82 70 - 110 mg/dL Final     BUN   Date Value Ref Range Status   11/28/2018 9 6 - 20 mg/dL Final     Creatinine   Date Value Ref Range Status   11/28/2018 0.7 0.5 - 1.4 mg/dL Final     Calcium   Date Value Ref Range Status   11/28/2018 9.6 8.7 - 10.5 mg/dL Final     Total Protein   Date Value Ref Range Status   11/28/2018 8.2 6.0 - 8.4 g/dL Final     Albumin   Date Value Ref Range Status   11/28/2018 3.7 3.5 - 5.2 g/dL Final     Total Bilirubin   Date Value Ref Range Status   11/28/2018 0.4 0.1 - 1.0 mg/dL Final     Comment:     For infants and newborns, interpretation of results should be based  on gestational age, weight and in agreement with clinical  observations.  Premature Infant recommended reference ranges:  Up to 24 hours.............<8.0 mg/dL  Up to 48 hours............<12.0 mg/dL  3-5 days..................<15.0 mg/dL  6-29 days.................<15.0 mg/dL       Alkaline Phosphatase   Date Value Ref Range Status   11/28/2018 66 55 - 135 U/L Final     AST   Date Value Ref Range Status   11/28/2018 16 10 - 40 U/L Final     ALT   Date Value Ref Range Status   11/28/2018 13 10 - 44 U/L Final     Anion Gap   Date Value Ref Range Status   11/28/2018 7 (L) 8 - 16 mmol/L Final     eGFR if    Date Value Ref Range Status   11/28/2018 >60.0 >60 mL/min/1.73 m^2 Final     eGFR if non    Date Value Ref Range  Status   11/28/2018 >60.0 >60 mL/min/1.73 m^2 Final     Comment:     Calculation used to obtain the estimated glomerular filtration  rate (eGFR) is the CKD-EPI equation.        Lab Results   Component Value Date    WBC 7.10 11/28/2018    HGB 13.1 11/28/2018    HCT 41.4 11/28/2018    MCV 84 11/28/2018     11/28/2018     Lab Results   Component Value Date    CHOL 232 (H) 11/28/2018     Lab Results   Component Value Date    HDL 64 11/28/2018     Lab Results   Component Value Date    LDLCALC 151.4 11/28/2018     Lab Results   Component Value Date    TRIG 83 11/28/2018     Lab Results   Component Value Date    CHOLHDL 27.6 11/28/2018     Lab Results   Component Value Date    TSH 0.892 11/28/2018     Lab Results   Component Value Date    HGBA1C 5.5 08/30/2018     Assessment:       1. Finger infection        Plan:   Finger infection----------left index finger paronychia---------    Other orders  -     doxycycline (VIBRA-TABS) 100 MG tablet; Take 1 tablet (100 mg total) by mouth 2 (two) times daily. for 10 days  Dispense: 20 tablet; Refill: 0  -     mupirocin (BACTROBAN) 2 % ointment; Apply topically 2 (two) times daily as needed.  Dispense: 22 g; Refill: 0    As above-----------call if persists---------------

## 2022-07-28 NOTE — PROGRESS NOTES
"Subjective:       Patient ID: Marco Antonio Cisse is a 36 y.o. Black or  female here today for:  finger swelling       PCP:  Randal Jain NP --- date of last visit 6/9/2022  The patient's last visit with me was on 6/9/2022.    HPI    Marco Antonio is here to f/u on finger pain and swelling.  Originally seen at  and started on Bactrim for finger infection.  Not able to tolerate as it made her sick to her stomach.  Did see Dr. Alvarez on 7/28/2022, rx given for doxy and Bactroban ointment.  Insurance did not cover doxy "b/c it was too soon after Bactrim ordered".  Current pain scale 6/10.  Soaking in hot water with salt, peroxide, or rubbing alcohol.      Review of Systems   Constitutional: Negative for chills and fever.   Skin: Positive for wound.         Review of patient's allergies indicates:  No Known Allergies      Patient Active Problem List   Diagnosis    Migraine headache    Hypermenorrhea    Vitamin D deficiency disease    Insulin resistance    Irritable bowel syndrome (IBS)    Fluid retention    Panic anxiety syndrome       Current Outpatient Medications:     albuterol (PROVENTIL/VENTOLIN HFA) 90 mcg/actuation inhaler, INHALE 2 PUFFS INTO THE LUNGS EVERY 4 TO 6 HOURS AS NEEDED FOR WHEEZING, Disp: 8.5 g, Rfl: 2    doxycycline (VIBRA-TABS) 100 MG tablet, Take 1 tablet (100 mg total) by mouth 2 (two) times daily. for 10 days, Disp: 20 tablet, Rfl: 0    mupirocin (BACTROBAN) 2 % ointment, Apply topically 2 (two) times daily as needed., Disp: 22 g, Rfl: 0    pantoprazole (PROTONIX) 40 MG tablet, Take 40 mg by mouth once daily., Disp: , Rfl:     sertraline (ZOLOFT) 25 MG tablet, Take 1 tablet (25 mg total) by mouth once daily., Disp: 30 tablet, Rfl: 6    EPINEPHrine (EPIPEN) 0.3 mg/0.3 mL AtIn, Inject 0.3 mLs (0.3 mg total) into the muscle once. for 1 dose, Disp: 2 each, Rfl: 3      Past medical, surgical, family and social histories have been reviewed today.      Objective: " "      Vitals:    08/01/22 1357   BP: 106/68   Pulse: 84   Temp: 98.5 °F (36.9 °C)   SpO2: 99%   Weight: 93.9 kg (207 lb 2 oz)   Height: 5' 6" (1.676 m)   PainSc:   6   PainLoc: Finger       Physical Exam  Vitals reviewed.   Constitutional:       General: She is not in acute distress.  Skin:     Comments: Paronychia noted to left index finger to proximal and lateral nailbed.   Neurological:      Mental Status: She is alert.         Incision & Drainage    Date/Time: 8/1/2022 2:15 PM  Performed by: Randal Jain NP  Authorized by: Randal Jain NP     Time out: Immediately prior to procedure a "time out" was called to verify the correct patient, procedure, equipment, support staff and site/side marked as required.    Consent Done?:  Yes (Verbal)    Type:  Abscess (paronychia/nailbed infection)  Body area:  Upper extremity  Location details:  Left index finger  Local anesthetic: lidocaine spray  Incision depth: subcutaneous    Complexity:  Simple (punctured with sterile needle)  Drainage:  Pus and bloody  Drainage amount:  Moderate  Wound treatment:  Expression of material and incision  Packing material:  None  Patient tolerance:  Patient tolerated the procedure well with no immediate complications      Diagnosis/Assessment:     1. Paronychia of finger, left  - cephALEXin (KEFLEX) 500 MG capsule; Take 1 capsule (500 mg total) by mouth every 12 (twelve) hours. for 7 days  Dispense: 14 capsule; Refill: 0  - Incision & Drainage       Plan:     Continue Bactroban as ordered.  Warm finger soaks with liquid anti-bacterial soap as directed.    Follow-up:     Contact office back in 2 to 3 days if worse or no better.  RTC as directed or on prn basis.        ADALBERTO Chavez  [facility]       Patient Care Team:  Randal Jain NP as PCP - General (Family Medicine)  Severo Gonzalez MD as Obstetrician (Obstetrics)  Destini Lawson RD as Dietitian (Endocrinology)      30 minutes " of total time spent on the encounter, which includes face to face time and non-face to face time preparing to see the patient (eg, review of tests), obtaining and/or reviewing separately obtained history, and documenting clinical information in the electronic or other health record.  Also includes independent interpretation of results (not separately reported) and communicating results to the patient/family/caregiver, with care coordination (not separately reported).

## 2022-08-01 ENCOUNTER — OFFICE VISIT (OUTPATIENT)
Dept: FAMILY MEDICINE | Facility: CLINIC | Age: 37
End: 2022-08-01
Payer: COMMERCIAL

## 2022-08-01 VITALS
TEMPERATURE: 99 F | HEART RATE: 84 BPM | DIASTOLIC BLOOD PRESSURE: 68 MMHG | SYSTOLIC BLOOD PRESSURE: 106 MMHG | HEIGHT: 66 IN | BODY MASS INDEX: 33.29 KG/M2 | WEIGHT: 207.13 LBS | OXYGEN SATURATION: 99 %

## 2022-08-01 DIAGNOSIS — L03.012 PARONYCHIA OF FINGER, LEFT: Primary | ICD-10-CM

## 2022-08-01 PROCEDURE — 99214 OFFICE O/P EST MOD 30 MIN: CPT | Mod: 25,S$GLB,, | Performed by: REGISTERED NURSE

## 2022-08-01 PROCEDURE — 26010 DRAINAGE OF FINGER ABSCESS: CPT | Mod: S$GLB,,, | Performed by: REGISTERED NURSE

## 2022-08-01 PROCEDURE — 99214 PR OFFICE/OUTPT VISIT, EST, LEVL IV, 30-39 MIN: ICD-10-PCS | Mod: 25,S$GLB,, | Performed by: REGISTERED NURSE

## 2022-08-01 PROCEDURE — 99999 PR PBB SHADOW E&M-EST. PATIENT-LVL IV: ICD-10-PCS | Mod: PBBFAC,,, | Performed by: REGISTERED NURSE

## 2022-08-01 PROCEDURE — 26010 INCISION & DRAINAGE: ICD-10-PCS | Mod: S$GLB,,, | Performed by: REGISTERED NURSE

## 2022-08-01 PROCEDURE — 99999 PR PBB SHADOW E&M-EST. PATIENT-LVL IV: CPT | Mod: PBBFAC,,, | Performed by: REGISTERED NURSE

## 2022-08-01 RX ORDER — CEPHALEXIN 500 MG/1
500 CAPSULE ORAL EVERY 12 HOURS
Qty: 14 CAPSULE | Refills: 0 | Status: SHIPPED | OUTPATIENT
Start: 2022-08-01 | End: 2022-08-08

## 2022-08-09 ENCOUNTER — TELEPHONE (OUTPATIENT)
Dept: FAMILY MEDICINE | Facility: CLINIC | Age: 37
End: 2022-08-09
Payer: COMMERCIAL

## 2022-08-09 NOTE — TELEPHONE ENCOUNTER
----- Message from Mary Colvin sent at 8/9/2022  6:34 AM CDT -----  Regarding: shot record  Contact: patient  Patient is requesting a copy of her shots, patient would like to pick it up, please call her back at    [phone]

## 2022-08-09 NOTE — TELEPHONE ENCOUNTER
Per pt calls .Please Advise     Called and spoke with pt , pt asked for a copy of her shot record for to return to  school. Pt was notifity vaccines were not update on LIN[facility] . Pt verbalized understanding a ask to be seen in office to discuss shot record

## 2022-08-10 NOTE — TELEPHONE ENCOUNTER
Pt stated that she will follow back up when she takes the papers she has now to the school     Yanira Amaro MA

## 2022-10-24 ENCOUNTER — IMMUNIZATION (OUTPATIENT)
Dept: FAMILY MEDICINE | Facility: CLINIC | Age: 37
End: 2022-10-24
Payer: COMMERCIAL

## 2022-10-24 PROCEDURE — 90686 IIV4 VACC NO PRSV 0.5 ML IM: CPT | Mod: S$GLB,,, | Performed by: FAMILY MEDICINE

## 2022-10-24 PROCEDURE — 90471 IMMUNIZATION ADMIN: CPT | Mod: S$GLB,,, | Performed by: FAMILY MEDICINE

## 2022-10-24 PROCEDURE — 90686 FLU VACCINE (QUAD) GREATER THAN OR EQUAL TO 3YO PRESERVATIVE FREE IM: ICD-10-PCS | Mod: S$GLB,,, | Performed by: FAMILY MEDICINE

## 2022-10-24 PROCEDURE — 90471 FLU VACCINE (QUAD) GREATER THAN OR EQUAL TO 3YO PRESERVATIVE FREE IM: ICD-10-PCS | Mod: S$GLB,,, | Performed by: FAMILY MEDICINE

## 2022-11-22 ENCOUNTER — PATIENT OUTREACH (OUTPATIENT)
Dept: ADMINISTRATIVE | Facility: HOSPITAL | Age: 37
End: 2022-11-22
Payer: COMMERCIAL

## 2022-11-22 NOTE — PROGRESS NOTES
Working [facility] Panel Report:     Pt is paneled to [facility]. Called to discuss scheduling an appointment with PCP to establish care. No answer, LVM

## 2022-12-07 DIAGNOSIS — R03.0 ELEVATED BLOOD PRESSURE, SITUATIONAL: ICD-10-CM

## 2023-02-08 ENCOUNTER — IMMUNIZATION (OUTPATIENT)
Dept: PHARMACY | Facility: CLINIC | Age: 38
End: 2023-02-08
Payer: COMMERCIAL

## 2023-02-08 DIAGNOSIS — Z23 NEED FOR VACCINATION: Primary | ICD-10-CM

## 2023-06-09 ENCOUNTER — PATIENT MESSAGE (OUTPATIENT)
Dept: FAMILY MEDICINE | Facility: CLINIC | Age: 38
End: 2023-06-09
Payer: COMMERCIAL

## 2023-06-28 ENCOUNTER — LAB VISIT (OUTPATIENT)
Dept: LAB | Facility: HOSPITAL | Age: 38
End: 2023-06-28
Attending: FAMILY MEDICINE
Payer: COMMERCIAL

## 2023-06-28 ENCOUNTER — OFFICE VISIT (OUTPATIENT)
Dept: FAMILY MEDICINE | Facility: CLINIC | Age: 38
End: 2023-06-28
Payer: COMMERCIAL

## 2023-06-28 VITALS
SYSTOLIC BLOOD PRESSURE: 102 MMHG | HEIGHT: 66 IN | OXYGEN SATURATION: 99 % | BODY MASS INDEX: 31.99 KG/M2 | DIASTOLIC BLOOD PRESSURE: 72 MMHG | TEMPERATURE: 97 F | WEIGHT: 199.06 LBS | HEART RATE: 66 BPM

## 2023-06-28 DIAGNOSIS — F43.22 ADJUSTMENT DISORDER WITH ANXIOUS MOOD: ICD-10-CM

## 2023-06-28 DIAGNOSIS — Z76.0 MEDICATION REFILL: ICD-10-CM

## 2023-06-28 DIAGNOSIS — E55.9 VITAMIN D DEFICIENCY DISEASE: ICD-10-CM

## 2023-06-28 DIAGNOSIS — Z91.018 FOOD ALLERGY: ICD-10-CM

## 2023-06-28 DIAGNOSIS — B35.1 FUNGAL INFECTION OF TOENAIL: ICD-10-CM

## 2023-06-28 DIAGNOSIS — K21.9 GASTROESOPHAGEAL REFLUX DISEASE, UNSPECIFIED WHETHER ESOPHAGITIS PRESENT: ICD-10-CM

## 2023-06-28 DIAGNOSIS — D50.8 IRON DEFICIENCY ANEMIA FOLLOWING BARIATRIC SURGERY: ICD-10-CM

## 2023-06-28 DIAGNOSIS — Z00.00 PREVENTATIVE HEALTH CARE: ICD-10-CM

## 2023-06-28 DIAGNOSIS — E78.5 DYSLIPIDEMIA: ICD-10-CM

## 2023-06-28 DIAGNOSIS — Z00.00 PREVENTATIVE HEALTH CARE: Primary | ICD-10-CM

## 2023-06-28 DIAGNOSIS — E66.9 CLASS 1 OBESITY WITH SERIOUS COMORBIDITY AND BODY MASS INDEX (BMI) OF 32.0 TO 32.9 IN ADULT, UNSPECIFIED OBESITY TYPE: ICD-10-CM

## 2023-06-28 DIAGNOSIS — K95.89 IRON DEFICIENCY ANEMIA FOLLOWING BARIATRIC SURGERY: ICD-10-CM

## 2023-06-28 LAB
BASOPHILS # BLD AUTO: 0.04 K/UL (ref 0–0.2)
BASOPHILS NFR BLD: 0.8 % (ref 0–1.9)
DIFFERENTIAL METHOD: ABNORMAL
EOSINOPHIL # BLD AUTO: 0.1 K/UL (ref 0–0.5)
EOSINOPHIL NFR BLD: 1.6 % (ref 0–8)
ERYTHROCYTE [DISTWIDTH] IN BLOOD BY AUTOMATED COUNT: 16.2 % (ref 11.5–14.5)
HCT VFR BLD AUTO: 41.9 % (ref 37–48.5)
HGB BLD-MCNC: 13 G/DL (ref 12–16)
IMM GRANULOCYTES # BLD AUTO: 0.02 K/UL (ref 0–0.04)
IMM GRANULOCYTES NFR BLD AUTO: 0.4 % (ref 0–0.5)
LYMPHOCYTES # BLD AUTO: 1.9 K/UL (ref 1–4.8)
LYMPHOCYTES NFR BLD: 39.3 % (ref 18–48)
MCH RBC QN AUTO: 26.8 PG (ref 27–31)
MCHC RBC AUTO-ENTMCNC: 31 G/DL (ref 32–36)
MCV RBC AUTO: 86 FL (ref 82–98)
MONOCYTES # BLD AUTO: 0.4 K/UL (ref 0.3–1)
MONOCYTES NFR BLD: 8.1 % (ref 4–15)
NEUTROPHILS # BLD AUTO: 2.4 K/UL (ref 1.8–7.7)
NEUTROPHILS NFR BLD: 49.8 % (ref 38–73)
NRBC BLD-RTO: 0 /100 WBC
PLATELET # BLD AUTO: 192 K/UL (ref 150–450)
PMV BLD AUTO: 12.3 FL (ref 9.2–12.9)
RBC # BLD AUTO: 4.85 M/UL (ref 4–5.4)
WBC # BLD AUTO: 4.91 K/UL (ref 3.9–12.7)

## 2023-06-28 PROCEDURE — 82746 ASSAY OF FOLIC ACID SERUM: CPT | Performed by: REGISTERED NURSE

## 2023-06-28 PROCEDURE — 99395 PR PREVENTIVE VISIT,EST,18-39: ICD-10-PCS | Mod: S$GLB,,, | Performed by: REGISTERED NURSE

## 2023-06-28 PROCEDURE — 80053 COMPREHEN METABOLIC PANEL: CPT | Performed by: REGISTERED NURSE

## 2023-06-28 PROCEDURE — 85025 COMPLETE CBC W/AUTO DIFF WBC: CPT | Performed by: REGISTERED NURSE

## 2023-06-28 PROCEDURE — 82306 VITAMIN D 25 HYDROXY: CPT | Performed by: REGISTERED NURSE

## 2023-06-28 PROCEDURE — 86803 HEPATITIS C AB TEST: CPT | Performed by: REGISTERED NURSE

## 2023-06-28 PROCEDURE — 87389 HIV-1 AG W/HIV-1&-2 AB AG IA: CPT | Performed by: REGISTERED NURSE

## 2023-06-28 PROCEDURE — 99999 PR PBB SHADOW E&M-EST. PATIENT-LVL III: CPT | Mod: PBBFAC,,, | Performed by: REGISTERED NURSE

## 2023-06-28 PROCEDURE — 99395 PREV VISIT EST AGE 18-39: CPT | Mod: S$GLB,,, | Performed by: REGISTERED NURSE

## 2023-06-28 PROCEDURE — 99999 PR PBB SHADOW E&M-EST. PATIENT-LVL III: ICD-10-PCS | Mod: PBBFAC,,, | Performed by: REGISTERED NURSE

## 2023-06-28 PROCEDURE — 36415 COLL VENOUS BLD VENIPUNCTURE: CPT | Mod: PO | Performed by: REGISTERED NURSE

## 2023-06-28 RX ORDER — PANTOPRAZOLE SODIUM 40 MG/1
40 TABLET, DELAYED RELEASE ORAL DAILY PRN
Qty: 90 TABLET | Refills: 1 | Status: SHIPPED | OUTPATIENT
Start: 2023-06-28 | End: 2024-01-11

## 2023-06-28 RX ORDER — CICLOPIROX 80 MG/ML
SOLUTION TOPICAL NIGHTLY
Qty: 6.6 ML | Refills: 6 | Status: SHIPPED | OUTPATIENT
Start: 2023-06-28 | End: 2024-01-11

## 2023-06-28 RX ORDER — SERTRALINE HYDROCHLORIDE 25 MG/1
25 TABLET, FILM COATED ORAL DAILY
Qty: 90 TABLET | Refills: 1 | Status: SHIPPED | OUTPATIENT
Start: 2023-06-28 | End: 2023-12-21

## 2023-06-28 RX ORDER — EPINEPHRINE 0.3 MG/.3ML
1 INJECTION SUBCUTANEOUS ONCE
Qty: 2 EACH | Status: SHIPPED | OUTPATIENT
Start: 2023-06-28 | End: 2023-09-18 | Stop reason: SDUPTHER

## 2023-06-28 RX ORDER — PANTOPRAZOLE SODIUM 40 MG/1
40 TABLET, DELAYED RELEASE ORAL
COMMUNITY
Start: 2023-02-18 | End: 2023-06-28 | Stop reason: SDUPTHER

## 2023-06-28 RX ORDER — ALBUTEROL SULFATE 90 UG/1
2 AEROSOL, METERED RESPIRATORY (INHALATION)
Qty: 8.5 G | Refills: 2 | Status: SHIPPED | OUTPATIENT
Start: 2023-06-28 | End: 2024-01-28 | Stop reason: SDUPTHER

## 2023-06-28 NOTE — PROGRESS NOTES
SUBJECTIVE:     Marco Antonio Cisse is a 37 y.o. female, here today for:   PREVENTATIVE HEALTH EXAM    HPI:    Marco Antonio Cisse has fasted to have bloodwork done today.  I have reviewed the patient's medical history in detail and updated the computerized patient record.  History obtained from the patient.      HEALTHCARE MAINTENANCE:    COMPLETED:  Health Maintenance Topics with due status: Not Due       Topic Last Completion Date    Cervical Cancer Screening 12/20/2022    Hemoglobin A1c (Diabetic Prevention Screening) 01/31/2023       DUE:  Health Maintenance Due   Topic Date Due    Hepatitis C Screening  Never done    HIV Screening  Never done    TETANUS VACCINE  02/20/2023       REVIEW OF SYSTEMS:    Review of Systems   Constitutional:  Negative for activity change, appetite change, chills, diaphoresis, fatigue, fever and unexpected weight change.        Wt Readings from Last 3 Encounters:  06/28/23 1156 : 90.3 kg (199 lb 1.2 oz)  12/20/22 0855 : 88.9 kg (196 lb)  08/01/22 1357 : 93.9 kg (207 lb 2 oz)  Gradual weight decline noted with h/o bariatric sx per Dr. MICHELLE Henriquez.  Does take bariatric vits daily.   HENT: Negative.     Eyes:  Negative for discharge and visual disturbance.   Respiratory:  Negative for cough, shortness of breath and wheezing.    Cardiovascular:  Negative for chest pain, palpitations and leg swelling.   Gastrointestinal: Negative.    Genitourinary: Negative.    Musculoskeletal: Negative.    Integumentary:  Negative for rash and mole/lesion.        Reports fungal infection to right great toe   Neurological:  Negative for vertigo, seizures, syncope, numbness and headaches.   Hematological: Negative.    Psychiatric/Behavioral:  Negative for depression and sleep disturbance. The patient is not nervous/anxious.    Breast: negative.          ALLERGIES:  Review of patient's allergies indicates:   Allergen Reactions    Bactrim ds [sulfamethoxazole-trimethoprim] Nausea And Vomiting        CURRENT PROBLEM LIST:  Patient Active Problem List   Diagnosis    Migraine headache    Hypermenorrhea    Vitamin D deficiency disease    Insulin resistance    Irritable bowel syndrome (IBS)    Fluid retention    Panic anxiety syndrome       CURRENT MEDICATIONS:    Current Outpatient Medications:     albuterol (PROVENTIL/VENTOLIN HFA) 90 mcg/actuation inhaler, INHALE 2 PUFFS INTO THE LUNGS EVERY 4 TO 6 HOURS AS NEEDED FOR WHEEZING, Disp: 8.5 g, Rfl: 2    EPINEPHrine (EPIPEN) 0.3 mg/0.3 mL AtIn, Inject 0.3 mLs (0.3 mg total) into the muscle once. for 1 dose, Disp: 2 each, Rfl: 3    mupirocin (BACTROBAN) 2 % ointment, Apply topically 2 (two) times daily as needed., Disp: 22 g, Rfl: 0    sertraline (ZOLOFT) 25 MG tablet, Take 1 tablet (25 mg total) by mouth once daily., Disp: 30 tablet, Rfl: 6      HISTORY:    PAST MEDICAL HISTORY:  Past Medical History:   Diagnosis Date    IBS (irritable bowel syndrome)     Insulin resistance syndrome     Migraines     Vitamin D deficiency disease        PAST SURGICAL HISTORY:  Past Surgical History:   Procedure Laterality Date    COLONOSCOPY N/A 10/07/2015    Procedure: COLONOSCOPY;  Surgeon: Kris Barker MD;  Location: [facility];  Service: Endoscopy;  Laterality: N/A;    ENDOMETRIAL ABLATION  03/05/2014    Novasure    KELOID EXCISION      LAPAROSCOPIC SLEEVE GASTRECTOMY  08/25/2021    SHOULDER ARTHROSCOPY      TUBAL LIGATION Bilateral 03/05/2014       FAMILY HISTORY:  Family History   Problem Relation Age of Onset    Hypertension Mother     Arthritis Mother     Diabetes Father     Hypertension Father     Stroke Father     Colon polyps Father     Arthritis Maternal Aunt     Lupus Cousin     Cancer Neg Hx        SOCIAL HISTORY:  Social History     Socioeconomic History    Marital status:     Number of children: 2   Occupational History    Occupation:      Employer: [employer]   Tobacco Use    Smoking status: Never    Smokeless tobacco: Never  "  Substance and Sexual Activity    Alcohol use: No     Alcohol/week: 0.0 standard drinks    Drug use: No    Sexual activity: Yes     Birth control/protection: Surgical         OBJECTIVE:     VITAL SIGNS:  Vitals:    06/28/23 1156   BP: 102/72   Pulse: 66   Temp: 97.1 °F (36.2 °C)   SpO2: 99%   Weight: 90.3 kg (199 lb 1.2 oz)   Height: 5' 6" (1.676 m)       CURRENT BMI:   Body mass index is 32.13 kg/m².      PHYSICAL EXAM:  Physical Exam  Constitutional:       General: She is not in acute distress.     Appearance: She is well-developed. She is not diaphoretic.   HENT:      Head: Normocephalic and atraumatic.      Right Ear: Tympanic membrane, ear canal and external ear normal. There is no impacted cerumen.      Left Ear: Tympanic membrane, ear canal and external ear normal. There is no impacted cerumen.      Nose: Nose normal. No nasal deformity, mucosal edema, congestion or rhinorrhea.      Mouth/Throat:      Mouth: Mucous membranes are moist. Mucous membranes are not dry and not cyanotic. No oral lesions.      Dentition: Normal dentition.      Pharynx: Oropharynx is clear. Uvula midline. No oropharyngeal exudate, posterior oropharyngeal erythema or uvula swelling.      Tonsils: No tonsillar abscesses.   Eyes:      General: Lids are normal. Lids are everted, no foreign bodies appreciated. No scleral icterus.        Right eye: No discharge.         Left eye: No discharge.      Conjunctiva/sclera: Conjunctivae normal.      Right eye: Right conjunctiva is not injected. No exudate.     Left eye: Left conjunctiva is not injected. No exudate.     Pupils: Pupils are equal, round, and reactive to light.   Neck:      Thyroid: No thyroid mass or thyromegaly.      Vascular: No carotid bruit or JVD.      Trachea: No tracheal deviation.   Cardiovascular:      Rate and Rhythm: Normal rate and regular rhythm.      Pulses: Normal pulses.      Heart sounds: Normal heart sounds. No murmur heard.    No friction rub. No gallop. "   Pulmonary:      Effort: Pulmonary effort is normal. No respiratory distress.      Breath sounds: Normal breath sounds. No stridor. No wheezing.   Chest:      Chest wall: No tenderness.   Abdominal:      General: Bowel sounds are normal. There is no distension.      Palpations: Abdomen is soft. There is no mass.      Tenderness: There is no abdominal tenderness. There is no guarding or rebound.   Genitourinary:     Comments: Deferred to GYN  Musculoskeletal:         General: No swelling, tenderness or deformity. Normal range of motion.      Cervical back: Normal range of motion and neck supple. No edema or erythema. Normal range of motion.      Right lower leg: No edema.      Left lower leg: No edema.   Feet:      Right foot:      Toenail Condition: Fungal disease present.  Lymphadenopathy:      Cervical: No cervical adenopathy.   Skin:     General: Skin is warm and dry.      Capillary Refill: Capillary refill takes less than 2 seconds.      Coloration: Skin is not pale.      Findings: No bruising, erythema or rash.   Neurological:      Mental Status: She is alert and oriented to person, place, and time.      Sensory: No sensory deficit.      Motor: No weakness, tremor, atrophy or abnormal muscle tone.      Coordination: Coordination normal.      Gait: Gait normal.      Deep Tendon Reflexes: Reflexes are normal and symmetric.   Psychiatric:         Mood and Affect: Mood normal.         Speech: Speech normal.         Behavior: Behavior normal.         Thought Content: Thought content normal.         Cognition and Memory: Memory is not impaired.         Judgment: Judgment normal.       ~~~~~~~~~~~~~~~~~~~~~~~~~~~~~~~~~~~~~~~~~~~~~~~    LABS REVIEWED:    HEMATOLOGY:    Lab Results   Component Value Date    WBC 7.10 11/28/2018    RBC 4.95 11/28/2018    HGB 13.1 11/28/2018    HCT 41.4 11/28/2018    MCV 84 11/28/2018    MCH 26.5 (L) 11/28/2018    MCHC 31.6 (L) 11/28/2018    RDW 16.2 (H) 11/28/2018     11/28/2018     MPV 11.9 11/28/2018    GRAN 4.1 11/28/2018    GRAN 57.4 11/28/2018    LYMPH 2.4 11/28/2018    LYMPH 33.2 11/28/2018    MONO 0.5 11/28/2018    MONO 7.0 11/28/2018    EOS 0.1 11/28/2018    BASO 0.04 11/28/2018    EOSINOPHIL 1.7 11/28/2018    BASOPHIL 0.6 11/28/2018     No results found for: FERRITIN  No results found for: UIBC, IRON, IRON, TRANS, TRANSFERRIN, TIBC, LABIRON, FESATURATED   No results found for: RETICCTPCT    CHEMISTRY:    Lab Results   Component Value Date     11/28/2018    K 4.1 11/28/2018     11/28/2018    CO2 27 11/28/2018    ANIONGAP 7 (L) 11/28/2018     Lab Results   Component Value Date    CALCIUM 9.6 11/28/2018     Magnesium   Date Value Ref Range Status   10/13/2015 2.1 1.6 - 2.6 mg/dL Final       LIPID PROFILE:    per Dr. MICHELLE Henriquez --- 1/2023:   Ref Range & Units 5 mo ago   Cholesterol 140 - 200 mg/dL 160    Triglycerides 35 - 150 mg/dL 40    HDL >50 mg/dL 57    LDL Calculated 60 - 135 95    Hdl/Cholesterol Ratio 0.00 - 4.45 2.81        RENAL:    Lab Results   Component Value Date    CREATININE 0.7 11/28/2018    BUN 9 11/28/2018     eGFR if    Date Value Ref Range Status   11/28/2018 >60.0 >60 mL/min/1.73 m^2 Final         No results found for: EGFRNORACEVR    No results found for: MICALBCREAT    No results found for: URICACID    LIVER:    Lab Results   Component Value Date    ALT 13 11/28/2018    AST 16 11/28/2018    ALKPHOS 66 11/28/2018    BILITOT 0.4 11/28/2018       THYROID:    Lab Results   Component Value Date    TSH 0.892 11/28/2018       DIABETES SCREENING:    Glucose   Date Value Ref Range Status   11/28/2018 82 70 - 110 mg/dL Final     Insulin   Date Value Ref Range Status   08/30/2018 15.4 <25.0 uU/mL Final     Lab Results   Component Value Date    HGBA1C 5.2 01/31/2023       STD/VIRAL DISEASE SCREEN:    No results found for: HIV1X2, UNK42DBBQ  No results found for: HAV, HEPAIGM, HEPBIGM, HEPBCAB, HBEAG, HEPCAB  No results found for: RPR  No results  found for: HDY3NHU, YZK2HSJ    NUTRITION/VITAMINS:    Lab Results   Component Value Date    LRCSUEXS50 355 03/04/2013     Vit D, 25-Hydroxy   Date Value Ref Range Status   10/08/2020 25 (L) 30 - 96 ng/mL Final     Comment:     Vitamin D deficiency.........<10 ng/mL                              Vitamin D insufficiency......10-29 ng/mL       Vitamin D sufficiency........> or equal to 30 ng/mL  Vitamin D toxicity............>100 ng/mL       No results found for: FOLATE      ASSESSMENT:     1. Preventative health care  Hepatitis C Antibody    HIV 1/2 Ag/Ab (4th Gen)    Vitamin D    CBC Auto Differential    FOLATE    Comprehensive Metabolic Panel      2. Dyslipidemia  Stable, well-controlled.  Treating with diet, exercise and weight loss.  History of bariatric surgery.      3. Iron deficiency anemia following bariatric surgery  CBC Auto Differential    FOLATE      4. Adjustment disorder with anxious mood  sertraline (ZOLOFT) 25 MG tablet      5. Gastroesophageal reflux disease, unspecified whether esophagitis present  pantoprazole (PROTONIX) 40 MG tablet  Refilled.      6. Fungal infection of toenail  ciclopirox (PENLAC) 8 % Soln  Avoid oral antifungal medication for now.  Monitor.      7. Food allergy  EPINEPHrine (EPIPEN) 0.3 mg/0.3 mL AtIn  Refilled.      8. Vitamin D deficiency disease  Vitamin D      9. Class 1 obesity with serious comorbidity and body mass index (BMI) of 32.0 to 32.9 in adult, unspecified obesity type  Healthy diet and lifestyle changes discussed.      10. Medication refill  albuterol (PROVENTIL/VENTOLIN HFA) 90 mcg/actuation inhaler    EPINEPHrine (EPIPEN) 0.3 mg/0.3 mL AtIn    sertraline (ZOLOFT) 25 MG tablet    pantoprazole (PROTONIX) 40 MG tablet          PLAN:     Healthy dietary and lifestyle changes discussed.  See GYN as scheduled in Dec 2023.  Meds refilled.    FOLLOW-UP:     Pending lab.  RTC as directed and/or prn.        ADALBERTO Chavez  [facility]        Patient Care Team:  Benito Alvarez MD as PCP - General (Internal Medicine)  Severo Gonzalez MD as Obstetrician (Obstetrics)  Destini Lawson RD as Dietitian (Endocrinology)  Randal Jain NP as Nurse Practitioner (Family Medicine)

## 2023-06-29 LAB
25(OH)D3+25(OH)D2 SERPL-MCNC: 26 NG/ML (ref 30–96)
ALBUMIN SERPL BCP-MCNC: 4.2 G/DL (ref 3.5–5.2)
ALP SERPL-CCNC: 49 U/L (ref 55–135)
ALT SERPL W/O P-5'-P-CCNC: 21 U/L (ref 10–44)
ANION GAP SERPL CALC-SCNC: 11 MMOL/L (ref 8–16)
AST SERPL-CCNC: 21 U/L (ref 10–40)
BILIRUB SERPL-MCNC: 0.6 MG/DL (ref 0.1–1)
BUN SERPL-MCNC: 10 MG/DL (ref 6–20)
CALCIUM SERPL-MCNC: 9.6 MG/DL (ref 8.7–10.5)
CHLORIDE SERPL-SCNC: 105 MMOL/L (ref 95–110)
CO2 SERPL-SCNC: 24 MMOL/L (ref 23–29)
CREAT SERPL-MCNC: 0.7 MG/DL (ref 0.5–1.4)
EST. GFR  (NO RACE VARIABLE): >60 ML/MIN/1.73 M^2
FOLATE SERPL-MCNC: 6.6 NG/ML (ref 4–24)
GLUCOSE SERPL-MCNC: 75 MG/DL (ref 70–110)
HCV AB SERPL QL IA: NORMAL
HIV 1+2 AB+HIV1 P24 AG SERPL QL IA: NORMAL
POTASSIUM SERPL-SCNC: 4.1 MMOL/L (ref 3.5–5.1)
PROT SERPL-MCNC: 8.1 G/DL (ref 6–8.4)
SODIUM SERPL-SCNC: 140 MMOL/L (ref 136–145)

## 2023-07-07 ENCOUNTER — PATIENT MESSAGE (OUTPATIENT)
Dept: INFECTIOUS DISEASES | Facility: CLINIC | Age: 38
End: 2023-07-07
Payer: COMMERCIAL

## 2023-08-08 ENCOUNTER — TELEPHONE (OUTPATIENT)
Dept: FAMILY MEDICINE | Facility: CLINIC | Age: 38
End: 2023-08-08
Payer: MEDICAID

## 2023-08-08 DIAGNOSIS — E65 ABDOMINAL PANNUS: Primary | ICD-10-CM

## 2023-08-08 DIAGNOSIS — Z98.84 HISTORY OF BARIATRIC SURGERY: ICD-10-CM

## 2023-08-08 NOTE — TELEPHONE ENCOUNTER
Tried to send over to PCP sent back saying you seen pt last please advise.... on Patient is calling regarding needing referral for Panniculectomy to be Dr. Scott De Dios at [facility].    KARLOS Doyle

## 2023-08-08 NOTE — TELEPHONE ENCOUNTER
----- Message from Joann Jett MA sent at 8/8/2023  2:54 PM CDT -----  Contact: DERREK  Hasn't seen Dr. HERNANDEZ in over a year.  Has been seeing Randal  ----- Message -----  From: Yanira Amaro MA  Sent: 8/8/2023   2:49 PM CDT  To: Kim BARILLAS Staff      ----- Message -----  From: Michelle Call  Sent: 8/8/2023  10:39 AM CDT  To: Susy RIZO Staff    Patient is calling regarding needing referral for Panniculectomy to be Dr. Scott De Dios at [facility]. Please fax to [phone].             Thanks

## 2023-08-10 ENCOUNTER — TELEPHONE (OUTPATIENT)
Dept: FAMILY MEDICINE | Facility: CLINIC | Age: 38
End: 2023-08-10
Payer: MEDICAID

## 2023-09-13 ENCOUNTER — TELEPHONE (OUTPATIENT)
Dept: FAMILY MEDICINE | Facility: CLINIC | Age: 38
End: 2023-09-13
Payer: MEDICAID

## 2023-09-18 ENCOUNTER — OFFICE VISIT (OUTPATIENT)
Dept: FAMILY MEDICINE | Facility: CLINIC | Age: 38
End: 2023-09-18
Payer: MEDICAID

## 2023-09-18 VITALS
HEART RATE: 73 BPM | TEMPERATURE: 98 F | DIASTOLIC BLOOD PRESSURE: 84 MMHG | SYSTOLIC BLOOD PRESSURE: 122 MMHG | OXYGEN SATURATION: 96 % | HEIGHT: 66 IN | RESPIRATION RATE: 18 BRPM | WEIGHT: 200.38 LBS | BODY MASS INDEX: 32.2 KG/M2

## 2023-09-18 DIAGNOSIS — Z76.0 MEDICATION REFILL: ICD-10-CM

## 2023-09-18 DIAGNOSIS — Z91.018 FOOD ALLERGY: ICD-10-CM

## 2023-09-18 PROCEDURE — 99999 PR PBB SHADOW E&M-EST. PATIENT-LVL III: ICD-10-PCS | Mod: PBBFAC,,, | Performed by: NURSE PRACTITIONER

## 2023-09-18 PROCEDURE — 99214 OFFICE O/P EST MOD 30 MIN: CPT | Mod: S$PBB,,, | Performed by: NURSE PRACTITIONER

## 2023-09-18 PROCEDURE — 3079F DIAST BP 80-89 MM HG: CPT | Mod: CPTII,,, | Performed by: NURSE PRACTITIONER

## 2023-09-18 PROCEDURE — 99213 OFFICE O/P EST LOW 20 MIN: CPT | Mod: PBBFAC,PO | Performed by: NURSE PRACTITIONER

## 2023-09-18 PROCEDURE — 3008F PR BODY MASS INDEX (BMI) DOCUMENTED: ICD-10-PCS | Mod: CPTII,,, | Performed by: NURSE PRACTITIONER

## 2023-09-18 PROCEDURE — 3008F BODY MASS INDEX DOCD: CPT | Mod: CPTII,,, | Performed by: NURSE PRACTITIONER

## 2023-09-18 PROCEDURE — 3079F PR MOST RECENT DIASTOLIC BLOOD PRESSURE 80-89 MM HG: ICD-10-PCS | Mod: CPTII,,, | Performed by: NURSE PRACTITIONER

## 2023-09-18 PROCEDURE — 1160F PR REVIEW ALL MEDS BY PRESCRIBER/CLIN PHARMACIST DOCUMENTED: ICD-10-PCS | Mod: CPTII,,, | Performed by: NURSE PRACTITIONER

## 2023-09-18 PROCEDURE — 1159F MED LIST DOCD IN RCRD: CPT | Mod: CPTII,,, | Performed by: NURSE PRACTITIONER

## 2023-09-18 PROCEDURE — 99214 PR OFFICE/OUTPT VISIT, EST, LEVL IV, 30-39 MIN: ICD-10-PCS | Mod: S$PBB,,, | Performed by: NURSE PRACTITIONER

## 2023-09-18 PROCEDURE — 99999 PR PBB SHADOW E&M-EST. PATIENT-LVL III: CPT | Mod: PBBFAC,,, | Performed by: NURSE PRACTITIONER

## 2023-09-18 PROCEDURE — 1159F PR MEDICATION LIST DOCUMENTED IN MEDICAL RECORD: ICD-10-PCS | Mod: CPTII,,, | Performed by: NURSE PRACTITIONER

## 2023-09-18 PROCEDURE — 3074F SYST BP LT 130 MM HG: CPT | Mod: CPTII,,, | Performed by: NURSE PRACTITIONER

## 2023-09-18 PROCEDURE — 1160F RVW MEDS BY RX/DR IN RCRD: CPT | Mod: CPTII,,, | Performed by: NURSE PRACTITIONER

## 2023-09-18 PROCEDURE — 3074F PR MOST RECENT SYSTOLIC BLOOD PRESSURE < 130 MM HG: ICD-10-PCS | Mod: CPTII,,, | Performed by: NURSE PRACTITIONER

## 2023-09-18 RX ORDER — EPINEPHRINE 0.3 MG/.3ML
1 INJECTION SUBCUTANEOUS ONCE
Qty: 2 EACH | Status: SHIPPED | OUTPATIENT
Start: 2023-09-18 | End: 2024-01-28 | Stop reason: SDUPTHER

## 2023-09-19 NOTE — PROGRESS NOTES
Marco Antonio Cisse  09/19/2023  [id number]    Benito Alvarez MD  Patient Care Team:  Benito Alvarez MD as PCP - General (Internal Medicine)  Severo Gonzalez MD as Obstetrician (Obstetrics)  Destini Lawson RD as Dietitian (Endocrinology)  Randal Jain NP as Nurse Practitioner (Family Medicine)          Visit Type:a scheduled routine follow-up visit    Chief Complaint:  Chief Complaint   Patient presents with    Follow-up       History of Present Illness:    38-year-old female presents today requesting  medication refill for an epinephrine pen.   No other complaints experienced at this time      History:  Past Medical History:   Diagnosis Date    IBS (irritable bowel syndrome)     Insulin resistance syndrome     Migraines     Vitamin D deficiency disease      Past Surgical History:   Procedure Laterality Date    COLONOSCOPY N/A 10/07/2015    Procedure: COLONOSCOPY;  Surgeon: Kris Barker MD;  Location: [facility];  Service: Endoscopy;  Laterality: N/A;    ENDOMETRIAL ABLATION  03/05/2014    Novasure    KELOID EXCISION      LAPAROSCOPIC SLEEVE GASTRECTOMY  08/25/2021    SHOULDER ARTHROSCOPY      TUBAL LIGATION Bilateral 03/05/2014     Family History   Problem Relation Age of Onset    Hypertension Mother     Arthritis Mother     Diabetes Father     Hypertension Father     Stroke Father     Colon polyps Father     Arthritis Maternal Aunt     Lupus Cousin     Cancer Neg Hx      Social History     Socioeconomic History    Marital status:     Number of children: 2   Occupational History    Occupation:      Employer: [employer]   Tobacco Use    Smoking status: Never    Smokeless tobacco: Never   Substance and Sexual Activity    Alcohol use: No     Alcohol/week: 0.0 standard drinks of alcohol    Drug use: No    Sexual activity: Yes     Birth control/protection: Surgical     Social Determinants of Health     Stress: No Stress Concern Present  (9/10/2019)    [employer] [place] [employer] - Occupational Stress Questionnaire     Feeling of Stress : Not at all     Patient Active Problem List   Diagnosis    Migraine headache    Hypermenorrhea    Vitamin D deficiency disease    Insulin resistance    Irritable bowel syndrome (IBS)    Fluid retention    Panic anxiety syndrome     Review of patient's allergies indicates:   Allergen Reactions    Bactrim ds [sulfamethoxazole-trimethoprim] Nausea And Vomiting       The following were reviewed at this visit: active problem list, medication list, allergies, family history, social history, and health maintenance.    Medications:  Current Outpatient Medications on File Prior to Visit   Medication Sig Dispense Refill    albuterol (PROVENTIL/VENTOLIN HFA) 90 mcg/actuation inhaler Inhale 2 puffs into the lungs every 4 to 6 hours as needed for Wheezing or Shortness of Breath. 8.5 g 2    ciclopirox (PENLAC) 8 % Soln Apply topically nightly. Leave solution on nail for at least 8 hours before washing.  Clean nail with alcohol once a week. 6.6 mL 6    mupirocin (BACTROBAN) 2 % ointment Apply topically 2 (two) times daily as needed. 22 g 0    pantoprazole (PROTONIX) 40 MG tablet Take 1 tablet (40 mg total) by mouth daily as needed (gerd/heartburn). 90 tablet 1    sertraline (ZOLOFT) 25 MG tablet Take 1 tablet (25 mg total) by mouth once daily. 90 tablet 1     No current facility-administered medications on file prior to visit.       Medications have been reviewed and reconciled with patient at this visit.  Barriers to medications reviewed with patient.    Adverse reactions to current medications reviewed with patient..    Over the counter medications reviewed and reconciled with patient.    Exam:  Wt Readings from Last 3 Encounters:   09/18/23 90.9 kg (200 lb 6.4 oz)   06/28/23 90.3 kg (199 lb 1.2 oz)   12/20/22 88.9 kg (196 lb)     Temp Readings from Last 3 Encounters:   09/18/23 97.6 °F (36.4 °C) (Tympanic)    06/28/23 97.1 °F (36.2 °C)   08/01/22 98.5 °F (36.9 °C)     BP Readings from Last 3 Encounters:   09/18/23 122/84   06/28/23 102/72   12/20/22 110/70     Pulse Readings from Last 3 Encounters:   09/18/23 73   06/28/23 66   08/01/22 84     Body mass index is 32.35 kg/m².      Review of Systems   Constitutional:  Negative for fever.   Respiratory:  Negative for cough, shortness of breath and wheezing.    Cardiovascular:  Negative for chest pain and palpitations.   Gastrointestinal:  Negative for nausea.   Neurological:  Negative for speech change, weakness and headaches.   All other systems reviewed and are negative.    Physical Exam  Vitals and nursing note reviewed.   Constitutional:       Appearance: Normal appearance. She is obese.   HENT:      Head: Normocephalic and atraumatic.      Right Ear: Tympanic membrane, ear canal and external ear normal.      Left Ear: Tympanic membrane, ear canal and external ear normal.      Nose: Nose normal.      Mouth/Throat:      Mouth: Mucous membranes are moist.      Pharynx: Oropharynx is clear.   Eyes:      Extraocular Movements: Extraocular movements intact.      Conjunctiva/sclera: Conjunctivae normal.      Pupils: Pupils are equal, round, and reactive to light.   Cardiovascular:      Rate and Rhythm: Normal rate and regular rhythm.      Pulses: Normal pulses.      Heart sounds: Normal heart sounds.   Pulmonary:      Effort: Pulmonary effort is normal.      Breath sounds: Normal breath sounds.   Abdominal:      General: Bowel sounds are normal.      Palpations: Abdomen is soft.   Musculoskeletal:         General: Normal range of motion.      Cervical back: Normal range of motion and neck supple.   Skin:     General: Skin is warm and dry.      Capillary Refill: Capillary refill takes less than 2 seconds.   Neurological:      General: No focal deficit present.      Mental Status: She is alert and oriented to person, place, and time.   Psychiatric:         Mood and Affect:  Mood normal.         Behavior: Behavior normal.         Thought Content: Thought content normal.         Judgment: Judgment normal.         Laboratory Reviewed ({Yes)  Lab Results   Component Value Date    WBC 4.91 06/28/2023    HGB 13.0 06/28/2023    HCT 41.9 06/28/2023     06/28/2023    CHOL 232 (H) 11/28/2018    TRIG 83 11/28/2018    HDL 64 11/28/2018    ALT 21 06/28/2023    AST 21 06/28/2023     06/28/2023    K 4.1 06/28/2023     06/28/2023    CREATININE 0.7 06/28/2023    BUN 10 06/28/2023    CO2 24 06/28/2023    TSH 0.75 01/31/2023    HGBA1C 5.2 01/31/2023       Marco Antonio was seen today for follow-up.    Diagnoses and all orders for this visit:    Food allergy  -     EPINEPHrine (EPIPEN) 0.3 mg/0.3 mL AtIn; Inject 0.3 mLs (0.3 mg total) into the muscle once. for 1 dose    Medication refill  -     EPINEPHrine (EPIPEN) 0.3 mg/0.3 mL AtIn; Inject 0.3 mLs (0.3 mg total) into the muscle once. for 1 dose        The current medical regimen is effective;  continue present plan and medications.         Care Plan/Goals: Reviewed    Goals    None     Marco Antonio was seen today for follow-up.    Diagnoses and all orders for this visit:    Food allergy  -     EPINEPHrine (EPIPEN) 0.3 mg/0.3 mL AtIn; Inject 0.3 mLs (0.3 mg total) into the muscle once. for 1 dose    Medication refill  -     EPINEPHrine (EPIPEN) 0.3 mg/0.3 mL AtIn; Inject 0.3 mLs (0.3 mg total) into the muscle once. for 1 dose         Follow up: Follow up in about 3 months (around 12/18/2023) for with tiera STEWART in 3 months.    After visit summary was printed and given to patient upon discharge today.  Patient goals and care plan are included in After Visit Summary.

## 2023-11-06 DIAGNOSIS — B37.9 YEAST INFECTION: Primary | ICD-10-CM

## 2023-11-06 RX ORDER — FLUCONAZOLE 200 MG/1
200 TABLET ORAL DAILY
Qty: 1 TABLET | Refills: 1 | Status: SHIPPED | OUTPATIENT
Start: 2023-11-06 | End: 2023-11-07

## 2023-12-21 DIAGNOSIS — F43.22 ADJUSTMENT DISORDER WITH ANXIOUS MOOD: ICD-10-CM

## 2023-12-21 DIAGNOSIS — Z76.0 MEDICATION REFILL: ICD-10-CM

## 2023-12-21 RX ORDER — SERTRALINE HYDROCHLORIDE 25 MG/1
25 TABLET, FILM COATED ORAL
Qty: 90 TABLET | Refills: 1 | Status: SHIPPED | OUTPATIENT
Start: 2023-12-21 | End: 2024-01-11

## 2024-01-28 DIAGNOSIS — Z76.0 MEDICATION REFILL: ICD-10-CM

## 2024-01-28 DIAGNOSIS — Z91.018 FOOD ALLERGY: ICD-10-CM

## 2024-01-29 RX ORDER — ALBUTEROL SULFATE 90 UG/1
2 AEROSOL, METERED RESPIRATORY (INHALATION)
Qty: 8.5 G | Refills: 2 | Status: SHIPPED | OUTPATIENT
Start: 2024-01-29

## 2024-01-29 RX ORDER — EPINEPHRINE 0.3 MG/.3ML
1 INJECTION SUBCUTANEOUS ONCE
Qty: 2 EACH | Status: SHIPPED | OUTPATIENT
Start: 2024-01-29 | End: 2024-01-29

## 2024-02-14 ENCOUNTER — OFFICE VISIT (OUTPATIENT)
Dept: SURGERY | Facility: CLINIC | Age: 39
End: 2024-02-14
Payer: MEDICAID

## 2024-02-14 DIAGNOSIS — N63.15 BREAST LUMP ON RIGHT SIDE AT 12 O'CLOCK POSITION: ICD-10-CM

## 2024-02-14 DIAGNOSIS — Z80.3 FAMILY HISTORY OF MALIGNANT NEOPLASM OF BREAST: ICD-10-CM

## 2024-02-14 DIAGNOSIS — D24.1 FIBROADENOMA OF RIGHT BREAST: Primary | ICD-10-CM

## 2024-02-14 PROCEDURE — 99212 OFFICE O/P EST SF 10 MIN: CPT | Mod: PBBFAC,PN | Performed by: NURSE PRACTITIONER

## 2024-02-14 PROCEDURE — 99999 PR PBB SHADOW E&M-EST. PATIENT-LVL II: CPT | Mod: PBBFAC,,, | Performed by: NURSE PRACTITIONER

## 2024-02-14 PROCEDURE — 1159F MED LIST DOCD IN RCRD: CPT | Mod: CPTII,,, | Performed by: NURSE PRACTITIONER

## 2024-02-14 PROCEDURE — 1160F RVW MEDS BY RX/DR IN RCRD: CPT | Mod: CPTII,,, | Performed by: NURSE PRACTITIONER

## 2024-02-14 PROCEDURE — 99203 OFFICE O/P NEW LOW 30 MIN: CPT | Mod: S$PBB,,, | Performed by: NURSE PRACTITIONER

## 2024-02-14 NOTE — PROGRESS NOTES
"    [facility]  MD Mynor Braun, NP-C    Date of Service: 2/14/2024      Chief Complaint:   Marco Antonio Cisse is a 38 y.o. female presenting today  to -[facility]. She is due for Physical Examination . She reports no interval changes on her self-breast examination.     History of Present Illness:    Mrs. Melendez first presented February 15, 2016 with "lumpiness" and tenderness. Imaging showed a right breast nodule that was found to be a benign fibroadenoma at sonocore biopsy. MD:::Severo Brewer MD.    Past Medical History:   Diagnosis Date    Breast pain     Family history of malignant neoplasm of breast 02/14/2024    Fibroadenoma of right breast     Hand swelling     History of swelling of feet     IBS (irritable bowel syndrome)     Insulin resistance syndrome     Migraines     Vitamin D deficiency disease       Past Surgical History:   Procedure Laterality Date    COLONOSCOPY N/A 10/07/2015    Procedure: COLONOSCOPY;  Surgeon: Kris Barker MD;  Location: [facility];  Service: Endoscopy;  Laterality: N/A;    ENDOMETRIAL ABLATION  03/05/2014    Novasure    KELOID EXCISION      LAPAROSCOPIC SLEEVE GASTRECTOMY  08/25/2021    Right breast sonocore biopsy 2/17/16      SHOULDER ARTHROSCOPY      TUBAL LIGATION Bilateral 03/05/2014        Current Outpatient Medications:     albuterol (PROVENTIL/VENTOLIN HFA) 90 mcg/actuation inhaler, Inhale 2 puffs into the lungs every 4 to 6 hours as needed for Wheezing or Shortness of Breath., Disp: 8.5 g, Rfl: 2    EPINEPHrine (EPIPEN) 0.3 mg/0.3 mL AtIn, Inject 0.3 mLs (0.3 mg total) into the muscle once. for 1 dose, Disp: 2 each, Rfl: PRN   Review of patient's allergies indicates:   Allergen Reactions    Bactrim ds [sulfamethoxazole-trimethoprim] Nausea And Vomiting      Social History     Tobacco Use    Smoking status: Never    Smokeless tobacco: Never   Substance Use Topics    Alcohol use: No     " Alcohol/week: 0.0 standard drinks of alcohol      Family History   Problem Relation Age of Onset    Hypertension Mother     Arthritis Mother     Diabetes Father     Hypertension Father     Stroke Father     Colon polyps Father     Arthritis Maternal Aunt     Breast cancer Paternal Aunt 60 - 69    Lupus Cousin     Cancer Neg Hx     Ovarian cancer Neg Hx         Review of Systems   Integumentary:  Negative for color change, rash, mole/lesion, breast mass, breast discharge and breast tenderness.   Breast: Negative for mass and tenderness       Physical Exam   Constitutional: She appears well-developed. She is cooperative.   HENT:   Head: Normocephalic.   Cardiovascular:  Normal rate and regular rhythm.            Pulmonary/Chest: She exhibits no tenderness and no bony tenderness. Right breast exhibits no mass, no nipple discharge, no skin change and no tenderness. Left breast exhibits no mass, no nipple discharge, no skin change and no tenderness.   Abdominal: Soft. Normal appearance.   Musculoskeletal: Lymphadenopathy:      Upper Body:      Right upper body: No supraclavicular or axillary adenopathy.      Left upper body: No supraclavicular or axillary adenopathy.     Neurological: She is alert.   Skin: No rash noted.            Assessment/Plan  1. Fibroadenoma of right breast  Assessment & Plan:  We reviewed our findings today and her questions were answered.  She understands that her  exams have remained stable (and show nothing concerning).  She is comfortable being followed in a conservative fashion.  Annual mammograms will start next year.     She understands the importance of monthly self-breast examination and knows to report any and all changes as they occur.        2. Breast lump on right side at 12 o'clock position  Assessment & Plan:  Her imaging and exams have remained stable.     Orders:  -     Ambulatory referral/consult to Breast Surgery    3. Family history of malignant neoplasm of breast  Assessment &  Plan:  We discussed her family history and how it could impact her own future risks.  We discussed family vs. genetic history and the importance and implications of each. All questions answered to her satisfaction.  She knows that as additional family members are diagnosed - she will need to let us know as this may change follow up and imaging recommendations.    We had a discussion concerning Breast Cancer Risk Reduction and current NCCN Guidelines. She knows that her risk can be lowered slightly with a healthy lifestyle and minimal ETOH use. Being physically active will also help. She should reduce or stay away from OCPs and HRT as possible.                Medical Decision Making:  It is my impression that this patient suffers all conditions contained in this medical document.  Each of these conditions did affect our plan of care and my medical decision making today.  It is my opinion that the medical decision making concerning this patient was of moderate difficulty based on the aforementioned conditions.  Any further recommendations will be communicated to the patient by me.  I have reviewed and verified her allergies, list of medications, medical and surgical histories, social history, and a pertinent review of symptoms.      Follow up:  1 year and prn    For:  ISHA (S) at [facility]

## 2024-02-14 NOTE — ASSESSMENT & PLAN NOTE
We reviewed our findings today and her questions were answered.  She understands that her  exams have remained stable (and show nothing concerning).  She is comfortable being followed in a conservative fashion.  Annual mammograms will start next year.     She understands the importance of monthly self-breast examination and knows to report any and all changes as they occur.

## 2024-02-19 ENCOUNTER — OFFICE VISIT (OUTPATIENT)
Dept: FAMILY MEDICINE | Facility: CLINIC | Age: 39
End: 2024-02-19
Payer: MEDICAID

## 2024-02-19 VITALS
SYSTOLIC BLOOD PRESSURE: 128 MMHG | HEIGHT: 66 IN | BODY MASS INDEX: 31.36 KG/M2 | TEMPERATURE: 97 F | OXYGEN SATURATION: 99 % | DIASTOLIC BLOOD PRESSURE: 84 MMHG | HEART RATE: 76 BPM | RESPIRATION RATE: 18 BRPM | WEIGHT: 195.13 LBS

## 2024-02-19 DIAGNOSIS — F41.9 ANXIETY: ICD-10-CM

## 2024-02-19 DIAGNOSIS — F32.A DEPRESSION, UNSPECIFIED DEPRESSION TYPE: Primary | ICD-10-CM

## 2024-02-19 DIAGNOSIS — G47.00 INSOMNIA, UNSPECIFIED TYPE: ICD-10-CM

## 2024-02-19 DIAGNOSIS — Z63.6 CAREGIVER STRESS: ICD-10-CM

## 2024-02-19 PROCEDURE — 1159F MED LIST DOCD IN RCRD: CPT | Mod: CPTII,,, | Performed by: NURSE PRACTITIONER

## 2024-02-19 PROCEDURE — 99214 OFFICE O/P EST MOD 30 MIN: CPT | Mod: S$PBB,,, | Performed by: NURSE PRACTITIONER

## 2024-02-19 PROCEDURE — 3079F DIAST BP 80-89 MM HG: CPT | Mod: CPTII,,, | Performed by: NURSE PRACTITIONER

## 2024-02-19 PROCEDURE — 3008F BODY MASS INDEX DOCD: CPT | Mod: CPTII,,, | Performed by: NURSE PRACTITIONER

## 2024-02-19 PROCEDURE — 99999 PR PBB SHADOW E&M-EST. PATIENT-LVL III: CPT | Mod: PBBFAC,,, | Performed by: NURSE PRACTITIONER

## 2024-02-19 PROCEDURE — 1160F RVW MEDS BY RX/DR IN RCRD: CPT | Mod: CPTII,,, | Performed by: NURSE PRACTITIONER

## 2024-02-19 PROCEDURE — 3074F SYST BP LT 130 MM HG: CPT | Mod: CPTII,,, | Performed by: NURSE PRACTITIONER

## 2024-02-19 PROCEDURE — 99213 OFFICE O/P EST LOW 20 MIN: CPT | Mod: PBBFAC,PO | Performed by: NURSE PRACTITIONER

## 2024-02-19 RX ORDER — SERTRALINE HYDROCHLORIDE 25 MG/1
25 TABLET, FILM COATED ORAL DAILY
Qty: 30 TABLET | Refills: 11 | Status: SHIPPED | OUTPATIENT
Start: 2024-02-19 | End: 2024-06-19

## 2024-02-19 SDOH — SOCIAL DETERMINANTS OF HEALTH (SDOH): DEPENDENT RELATIVE NEEDING CARE AT HOME: Z63.6

## 2024-02-19 NOTE — PROGRESS NOTES
Marco Antonio Cisse  02/21/2024  [id number]    Benito Alvarez MD  Patient Care Team:  Benito Alvarez MD as PCP - General (Internal Medicine)  Severo Gonzalez MD as Obstetrician (Obstetrics)  Destini Lawson RD as Dietitian (Endocrinology)  Randal Jain, NP as Nurse Practitioner (Family Medicine)          Visit Type:a scheduled routine follow-up visit    Chief Complaint:  Chief Complaint   Patient presents with    Anxiety    Depression    Headache       History of Present Illness:     38 year old female presents for co anxiety, depressed mood, anxiety and caregiver strain.  States since her husbands stroke a year ago she has taken on both roles at home and recently started school which her  does not support. Pt does not want medication at this time but was provided with information for Dr. Mitchell psychiatry.  f the No other complaints at this time.        History:  Past Medical History:   Diagnosis Date    Breast pain     Family history of malignant neoplasm of breast 02/14/2024    Fibroadenoma of right breast     Hand swelling     History of swelling of feet     IBS (irritable bowel syndrome)     Insulin resistance syndrome     Migraines     Vitamin D deficiency disease      Past Surgical History:   Procedure Laterality Date    COLONOSCOPY N/A 10/07/2015    Procedure: COLONOSCOPY;  Surgeon: Kris Barker MD;  Location: [facility];  Service: Endoscopy;  Laterality: N/A;    ENDOMETRIAL ABLATION  03/05/2014    Novasure    KELOID EXCISION      LAPAROSCOPIC SLEEVE GASTRECTOMY  08/25/2021    Right breast sonocore biopsy 2/17/16      SHOULDER ARTHROSCOPY      TUBAL LIGATION Bilateral 03/05/2014     Family History   Problem Relation Age of Onset    Hypertension Mother     Arthritis Mother     Diabetes Father     Hypertension Father     Stroke Father     Colon polyps Father     Arthritis Maternal Aunt     Breast cancer Paternal Aunt 60 - 69    Lupus Cousin     Cancer Neg  Hx     Ovarian cancer Neg Hx      Social History     Socioeconomic History    Marital status:     Number of children: 2   Occupational History    Occupation:      Employer: [employer]   Tobacco Use    Smoking status: Never    Smokeless tobacco: Never   Substance and Sexual Activity    Alcohol use: No     Alcohol/week: 0.0 standard drinks of alcohol    Drug use: No    Sexual activity: Yes     Birth control/protection: Surgical     Social Determinants of Health     Stress: No Stress Concern Present (9/10/2019)    [employer] [place] [employer] - Occupational Stress Questionnaire     Feeling of Stress : Not at all     Patient Active Problem List   Diagnosis    Migraine headache    Hypermenorrhea    Vitamin D deficiency disease    Insulin resistance    Irritable bowel syndrome (IBS)    Fluid retention    Panic anxiety syndrome    Fibroadenoma of right breast    Family history of malignant neoplasm of breast    Breast lump on right side at 12 o'clock position     Review of patient's allergies indicates:   Allergen Reactions    Bactrim ds [sulfamethoxazole-trimethoprim] Nausea And Vomiting       The following were reviewed at this visit: active problem list, medication list, allergies, family history, social history, and health maintenance.    Medications:  Current Outpatient Medications on File Prior to Visit   Medication Sig Dispense Refill    albuterol (PROVENTIL/VENTOLIN HFA) 90 mcg/actuation inhaler Inhale 2 puffs into the lungs every 4 to 6 hours as needed for Wheezing or Shortness of Breath. 8.5 g 2    EPINEPHrine (EPIPEN) 0.3 mg/0.3 mL AtIn Inject 0.3 mLs (0.3 mg total) into the muscle once. for 1 dose 2 each PRN     No current facility-administered medications on file prior to visit.       Medications have been reviewed and reconciled with patient at this visit.  Barriers to medications reviewed with patient.    Adverse reactions to current medications reviewed with  patient..    Over the counter medications reviewed and reconciled with patient.    Exam:  Wt Readings from Last 3 Encounters:   02/19/24 88.5 kg (195 lb 1.7 oz)   01/11/24 88.5 kg (195 lb)   09/18/23 90.9 kg (200 lb 6.4 oz)     Temp Readings from Last 3 Encounters:   02/19/24 96.7 °F (35.9 °C) (Tympanic)   09/18/23 97.6 °F (36.4 °C) (Tympanic)   06/28/23 97.1 °F (36.2 °C)     BP Readings from Last 3 Encounters:   02/19/24 128/84   01/11/24 124/78   09/18/23 122/84     Pulse Readings from Last 3 Encounters:   02/19/24 76   09/18/23 73   06/28/23 66     Body mass index is 31.49 kg/m².      Review of Systems   Constitutional:  Negative for fever.   Respiratory:  Negative for cough, shortness of breath and wheezing.    Cardiovascular:  Negative for chest pain and palpitations.   Gastrointestinal:  Negative for nausea.   Neurological:  Negative for speech change, weakness and headaches.   Psychiatric/Behavioral:  Positive for depression. The patient is nervous/anxious and has insomnia.    All other systems reviewed and are negative.    Physical Exam  Vitals and nursing note reviewed.   Constitutional:       Appearance: Normal appearance. She is obese.   HENT:      Head: Normocephalic and atraumatic.      Right Ear: Tympanic membrane, ear canal and external ear normal.      Left Ear: Tympanic membrane, ear canal and external ear normal.      Nose: Nose normal.      Mouth/Throat:      Mouth: Mucous membranes are moist.      Pharynx: Oropharynx is clear.   Eyes:      Extraocular Movements: Extraocular movements intact.      Conjunctiva/sclera: Conjunctivae normal.      Pupils: Pupils are equal, round, and reactive to light.   Cardiovascular:      Rate and Rhythm: Normal rate and regular rhythm.      Pulses: Normal pulses.      Heart sounds: Normal heart sounds.   Pulmonary:      Effort: Pulmonary effort is normal.      Breath sounds: Normal breath sounds.   Abdominal:      General: Bowel sounds are normal.      Palpations:  Abdomen is soft.   Musculoskeletal:         General: Normal range of motion.      Cervical back: Normal range of motion and neck supple.   Skin:     General: Skin is warm and dry.      Capillary Refill: Capillary refill takes less than 2 seconds.   Neurological:      General: No focal deficit present.      Mental Status: She is alert and oriented to person, place, and time.   Psychiatric:         Attention and Perception: Attention normal.         Mood and Affect: Mood is anxious and depressed. Affect is tearful.         Speech: Speech normal.         Behavior: Behavior normal. Behavior is cooperative.         Thought Content: Thought content normal.         Cognition and Memory: Cognition and memory normal.         Judgment: Judgment normal.         Laboratory Reviewed ({Yes)  Lab Results   Component Value Date    WBC 4.91 06/28/2023    HGB 13.0 06/28/2023    HCT 41.9 06/28/2023     06/28/2023    CHOL 232 (H) 11/28/2018    TRIG 83 11/28/2018    HDL 64 11/28/2018    ALT 21 06/28/2023    AST 21 06/28/2023     06/28/2023    K 4.1 06/28/2023     06/28/2023    CREATININE 0.7 06/28/2023    BUN 10 06/28/2023    CO2 24 06/28/2023    TSH 0.75 01/31/2023    HGBA1C 5.2 01/31/2023       Marco Antonio was seen today for anxiety, depression and headache.    Diagnoses and all orders for this visit:    Depression, unspecified depression type  -     sertraline (ZOLOFT) 25 MG tablet; Take 1 tablet (25 mg total) by mouth once daily.    Anxiety    Caregiver stress    Insomnia, unspecified type          Plan   Paperwork to psychiatrist was given to pt for f/u to Dr. Mitchell   Pt refused medication       Care Plan/Goals: Reviewed    Goals    None       Marco Antonio was seen today for anxiety, depression and headache.    Diagnoses and all orders for this visit:    Depression, unspecified depression type  -     sertraline (ZOLOFT) 25 MG tablet; Take 1 tablet (25 mg total) by mouth once daily.    Anxiety    Caregiver  stress    Insomnia, unspecified type       Follow up: Follow up if symptoms worsen or fail to improve.    After visit summary was printed and given to patient upon discharge today.  Patient goals and care plan are included in After Visit Summary.

## 2024-04-12 ENCOUNTER — TELEPHONE (OUTPATIENT)
Dept: FAMILY MEDICINE | Facility: CLINIC | Age: 39
End: 2024-04-12
Payer: MEDICAID

## 2024-04-12 NOTE — TELEPHONE ENCOUNTER
----- Message from Gagan Snow sent at 4/12/2024  8:29 AM CDT -----  Contact: Marco Antonio  Type:  Sooner Apoointment Request    Caller is requesting a sooner appointment.  Caller declined first available appointment listed below.  Caller will not accept being placed on the waitlist and is requesting a message be sent to doctor.  Name of Caller:Marco Antonio   When is the first available appointment?n/a  Symptoms:Neck and shoulder pain   Would the patient rather a call back or a response via [facility]? Call   Best Call Back Number:[phone]   Additional Information:

## 2024-04-12 NOTE — TELEPHONE ENCOUNTER
Contact pt to schedule appointment for neck,shoulder and back pain she's having. Pt is scheduled to see NP on 4/16/2024 at 11am.

## 2024-04-16 ENCOUNTER — HOSPITAL ENCOUNTER (OUTPATIENT)
Dept: RADIOLOGY | Facility: HOSPITAL | Age: 39
Discharge: HOME | End: 2024-04-16
Attending: NURSE PRACTITIONER
Payer: MEDICAID

## 2024-04-16 ENCOUNTER — OFFICE VISIT (OUTPATIENT)
Dept: FAMILY MEDICINE | Facility: CLINIC | Age: 39
End: 2024-04-16
Payer: MEDICAID

## 2024-04-16 VITALS
TEMPERATURE: 98 F | RESPIRATION RATE: 16 BRPM | OXYGEN SATURATION: 95 % | HEART RATE: 66 BPM | HEIGHT: 66 IN | BODY MASS INDEX: 31.89 KG/M2 | SYSTOLIC BLOOD PRESSURE: 118 MMHG | DIASTOLIC BLOOD PRESSURE: 72 MMHG | WEIGHT: 198.44 LBS

## 2024-04-16 DIAGNOSIS — G89.29 CHRONIC LEFT SHOULDER PAIN: ICD-10-CM

## 2024-04-16 DIAGNOSIS — M25.512 CHRONIC LEFT SHOULDER PAIN: ICD-10-CM

## 2024-04-16 DIAGNOSIS — M54.9 UPPER BACK PAIN: Primary | ICD-10-CM

## 2024-04-16 DIAGNOSIS — M54.9 UPPER BACK PAIN: ICD-10-CM

## 2024-04-16 PROCEDURE — 3008F BODY MASS INDEX DOCD: CPT | Mod: CPTII,,, | Performed by: NURSE PRACTITIONER

## 2024-04-16 PROCEDURE — 1159F MED LIST DOCD IN RCRD: CPT | Mod: CPTII,,, | Performed by: NURSE PRACTITIONER

## 2024-04-16 PROCEDURE — 72070 X-RAY EXAM THORAC SPINE 2VWS: CPT | Mod: 26,,, | Performed by: RADIOLOGY

## 2024-04-16 PROCEDURE — 96372 THER/PROPH/DIAG INJ SC/IM: CPT | Mod: PBBFAC,PO

## 2024-04-16 PROCEDURE — 99999 PR PBB SHADOW E&M-EST. PATIENT-LVL IV: CPT | Mod: PBBFAC,,, | Performed by: NURSE PRACTITIONER

## 2024-04-16 PROCEDURE — 72070 X-RAY EXAM THORAC SPINE 2VWS: CPT | Mod: TC,FY,PO

## 2024-04-16 PROCEDURE — 1160F RVW MEDS BY RX/DR IN RCRD: CPT | Mod: CPTII,,, | Performed by: NURSE PRACTITIONER

## 2024-04-16 PROCEDURE — 99214 OFFICE O/P EST MOD 30 MIN: CPT | Mod: PBBFAC,PO,25 | Performed by: NURSE PRACTITIONER

## 2024-04-16 PROCEDURE — 3074F SYST BP LT 130 MM HG: CPT | Mod: CPTII,,, | Performed by: NURSE PRACTITIONER

## 2024-04-16 PROCEDURE — 3078F DIAST BP <80 MM HG: CPT | Mod: CPTII,,, | Performed by: NURSE PRACTITIONER

## 2024-04-16 PROCEDURE — 99214 OFFICE O/P EST MOD 30 MIN: CPT | Mod: S$PBB,,, | Performed by: NURSE PRACTITIONER

## 2024-04-16 PROCEDURE — 99999PBSHW PR PBB SHADOW TECHNICAL ONLY FILED TO HB: Mod: PBBFAC,,,

## 2024-04-16 RX ORDER — KETOROLAC TROMETHAMINE 10 MG/1
10 TABLET, FILM COATED ORAL EVERY 6 HOURS
Qty: 20 TABLET | Refills: 0 | Status: SHIPPED | OUTPATIENT
Start: 2024-04-16 | End: 2024-04-21

## 2024-04-16 RX ORDER — KETOROLAC TROMETHAMINE 30 MG/ML
60 INJECTION, SOLUTION INTRAMUSCULAR; INTRAVENOUS
Status: COMPLETED | OUTPATIENT
Start: 2024-04-16 | End: 2024-04-16

## 2024-04-16 RX ORDER — CYCLOBENZAPRINE HCL 10 MG
10 TABLET ORAL NIGHTLY
Qty: 20 TABLET | Refills: 0 | Status: SHIPPED | OUTPATIENT
Start: 2024-04-16

## 2024-04-16 RX ADMIN — KETOROLAC TROMETHAMINE 60 MG: 60 INJECTION, SOLUTION INTRAMUSCULAR at 11:04

## 2024-04-16 NOTE — PROGRESS NOTES
Marco Antonio Cisse  04/16/2024  [id number]    Benito Alvarez MD  Patient Care Team:  Benito Alvarez MD as PCP - General (Internal Medicine)  Severo Gonzalez MD as Obstetrician (Obstetrics)  Destini Lawson RD as Dietitian (Endocrinology)  Randal Jain, NP as Nurse Practitioner (Family Medicine)          Visit Type:an urgent visit for a new problem    Chief Complaint:  Chief Complaint   Patient presents with    Back Pain       History of Present Illness:    37 yo female presents today with co upper back pain radiating to left shoulder pain for 7 days. Denies known injury or trauma .  No OTC medication for symptom relief     History:  Past Medical History:   Diagnosis Date    Breast pain     Family history of malignant neoplasm of breast 02/14/2024    Fibroadenoma of right breast     Hand swelling     History of swelling of feet     IBS (irritable bowel syndrome)     Insulin resistance syndrome     Migraines     Vitamin D deficiency disease      Past Surgical History:   Procedure Laterality Date    COLONOSCOPY N/A 10/07/2015    Procedure: COLONOSCOPY;  Surgeon: Kris Barker MD;  Location: [facility];  Service: Endoscopy;  Laterality: N/A;    ENDOMETRIAL ABLATION  03/05/2014    Novasure    KELOID EXCISION      LAPAROSCOPIC SLEEVE GASTRECTOMY  08/25/2021    Right breast sonocore biopsy 2/17/16      SHOULDER ARTHROSCOPY      TUBAL LIGATION Bilateral 03/05/2014     Family History   Problem Relation Name Age of Onset    Hypertension Mother      Arthritis Mother      Diabetes Father      Hypertension Father      Stroke Father      Colon polyps Father      Arthritis Maternal Aunt      Breast cancer Paternal Aunt  60 - 69    Lupus Cousin      Cancer Neg Hx      Ovarian cancer Neg Hx       Social History     Socioeconomic History    Marital status:     Number of children: 2   Occupational History    Occupation:      Employer: [employer]   Tobacco Use     Smoking status: Never    Smokeless tobacco: Never   Substance and Sexual Activity    Alcohol use: No     Alcohol/week: 0.0 standard drinks of alcohol    Drug use: No    Sexual activity: Yes     Birth control/protection: Surgical     Social Determinants of Health     Stress: No Stress Concern Present (9/10/2019)    [facility] [employer] - Occupational Stress Questionnaire     Feeling of Stress : Not at all     Patient Active Problem List   Diagnosis    Migraine headache    Hypermenorrhea    Vitamin D deficiency disease    Insulin resistance    Irritable bowel syndrome (IBS)    Fluid retention    Panic anxiety syndrome    Fibroadenoma of right breast    Family history of malignant neoplasm of breast    Breast lump on right side at 12 o'clock position     Review of patient's allergies indicates:   Allergen Reactions    Bactrim ds [sulfamethoxazole-trimethoprim] Nausea And Vomiting       The following were reviewed at this visit: active problem list, medication list, allergies, family history, social history, and health maintenance.    Medications:  Current Outpatient Medications on File Prior to Visit   Medication Sig Dispense Refill    albuterol (PROVENTIL/VENTOLIN HFA) 90 mcg/actuation inhaler Inhale 2 puffs into the lungs every 4 to 6 hours as needed for Wheezing or Shortness of Breath. 8.5 g 2    sertraline (ZOLOFT) 25 MG tablet Take 1 tablet (25 mg total) by mouth once daily. 30 tablet 11    EPINEPHrine (EPIPEN) 0.3 mg/0.3 mL AtIn Inject 0.3 mLs (0.3 mg total) into the muscle once. for 1 dose 2 each PRN     No current facility-administered medications on file prior to visit.       Medications have been reviewed and reconciled with patient at this visit.  Barriers to medications reviewed with patient.    Adverse reactions to current medications reviewed with patient..    Over the counter medications reviewed and reconciled with patient.    Exam:  Wt Readings from Last 3 Encounters:   04/16/24  90 kg (198 lb 6.6 oz)   02/19/24 88.5 kg (195 lb 1.7 oz)   01/11/24 88.5 kg (195 lb)     Temp Readings from Last 3 Encounters:   04/16/24 97.5 °F (36.4 °C) (Tympanic)   02/19/24 96.7 °F (35.9 °C) (Tympanic)   09/18/23 97.6 °F (36.4 °C) (Tympanic)     BP Readings from Last 3 Encounters:   04/16/24 118/72   02/19/24 128/84   01/11/24 124/78     Pulse Readings from Last 3 Encounters:   04/16/24 66   02/19/24 76   09/18/23 73     Body mass index is 32.02 kg/m².      Review of Systems   Constitutional:  Negative for fever.   Respiratory:  Negative for cough, shortness of breath and wheezing.    Cardiovascular:  Negative for chest pain and palpitations.   Gastrointestinal:  Negative for nausea.   Musculoskeletal:  Positive for back pain, myalgias and neck pain.   Neurological:  Negative for speech change, weakness and headaches.   All other systems reviewed and are negative.    Physical Exam  Vitals and nursing note reviewed.   Constitutional:       Appearance: Normal appearance. She is obese.   HENT:      Head: Normocephalic and atraumatic.      Right Ear: Tympanic membrane, ear canal and external ear normal.      Left Ear: Tympanic membrane, ear canal and external ear normal.      Nose: Nose normal.      Mouth/Throat:      Mouth: Mucous membranes are moist.      Pharynx: Oropharynx is clear.   Eyes:      Extraocular Movements: Extraocular movements intact.      Conjunctiva/sclera: Conjunctivae normal.      Pupils: Pupils are equal, round, and reactive to light.   Cardiovascular:      Rate and Rhythm: Normal rate and regular rhythm.      Pulses: Normal pulses.      Heart sounds: Normal heart sounds.   Pulmonary:      Effort: Pulmonary effort is normal.      Breath sounds: Normal breath sounds.   Abdominal:      General: Bowel sounds are normal.      Palpations: Abdomen is soft.   Musculoskeletal:         General: Normal range of motion.        Arms:       Cervical back: Normal range of motion and neck supple.   Skin:      General: Skin is warm and dry.      Capillary Refill: Capillary refill takes less than 2 seconds.   Neurological:      General: No focal deficit present.      Mental Status: She is alert and oriented to person, place, and time.   Psychiatric:         Mood and Affect: Mood normal.         Behavior: Behavior normal.         Thought Content: Thought content normal.         Judgment: Judgment normal.         Laboratory Reviewed ({Yes)  Lab Results   Component Value Date    WBC 4.91 06/28/2023    HGB 13.0 06/28/2023    HCT 41.9 06/28/2023     06/28/2023    CHOL 232 (H) 11/28/2018    TRIG 83 11/28/2018    HDL 64 11/28/2018    ALT 21 06/28/2023    AST 21 06/28/2023     06/28/2023    K 4.1 06/28/2023     06/28/2023    CREATININE 0.7 06/28/2023    BUN 10 06/28/2023    CO2 24 06/28/2023    TSH 0.75 01/31/2023    INR 0.97 08/10/2021    HGBA1C 5.2 01/31/2023       Marco Antonio was seen today for back pain.    Diagnoses and all orders for this visit:    Upper back pain  -     ketorolac injection 60 mg  -     cyclobenzaprine (FLEXERIL) 10 MG tablet; Take 1 tablet (10 mg total) by mouth every evening.  -     X-Ray Lumbar Spine AP And Lateral; Future  -     ketorolac (TORADOL) 10 mg tablet; Take 1 tablet (10 mg total) by mouth every 6 (six) hours. for 5 days  -     Ambulatory referral/consult to Physical/Occupational Therapy; Future    Chronic left shoulder pain          Plan   Xray  Ketorlac  Start medications prescribed today   PT f/u      Care Plan/Goals: Reviewed    Goals    None     Marco Antonio was seen today for back pain.    Diagnoses and all orders for this visit:    Upper back pain  -     ketorolac injection 60 mg  -     cyclobenzaprine (FLEXERIL) 10 MG tablet; Take 1 tablet (10 mg total) by mouth every evening.  -     X-Ray Lumbar Spine AP And Lateral; Future  -     ketorolac (TORADOL) 10 mg tablet; Take 1 tablet (10 mg total) by mouth every 6 (six) hours. for 5 days  -     Ambulatory referral/consult to  Physical/Occupational Therapy; Future    Chronic left shoulder pain         Follow up: Follow up if symptoms worsen or fail to improve, for with PT for evaluation and treatment.    After visit summary was printed and given to patient upon discharge today.  Patient goals and care plan are included in After Visit Summary.

## 2024-04-16 NOTE — LETTER
April 16, 2024      [facility]  [address] [place] [address]  Phone: [phone]  Fax: [phone]       Patient: Marco Antonio Cisse   YOB: 1985  Date of Visit: 04/16/2024    To Whom It May Concern:    Jose Alfredo Cisse  was at [facility] on 04/16/2024. The patient may return to work/school on 04/19/2024 with no restrictions. If you have any questions or concerns, or if I can be of further assistance, please do not hesitate to contact me.    Sincerely,    Renée Mckeon MA

## 2024-04-17 ENCOUNTER — PATIENT MESSAGE (OUTPATIENT)
Dept: FAMILY MEDICINE | Facility: CLINIC | Age: 39
End: 2024-04-17
Payer: MEDICAID

## 2024-04-22 ENCOUNTER — CLINICAL SUPPORT (OUTPATIENT)
Dept: REHABILITATION | Facility: HOSPITAL | Age: 39
End: 2024-04-22
Payer: MEDICAID

## 2024-04-22 DIAGNOSIS — M25.612 DECREASED RANGE OF MOTION OF LEFT SHOULDER: ICD-10-CM

## 2024-04-22 DIAGNOSIS — Z74.09 DECREASED FUNCTIONAL MOBILITY AND ENDURANCE: ICD-10-CM

## 2024-04-22 DIAGNOSIS — R29.898 DECREASED STRENGTH OF UPPER EXTREMITY: Primary | ICD-10-CM

## 2024-04-22 DIAGNOSIS — M54.9 UPPER BACK PAIN: ICD-10-CM

## 2024-04-22 PROCEDURE — 97110 THERAPEUTIC EXERCISES: CPT

## 2024-04-22 PROCEDURE — 97162 PT EVAL MOD COMPLEX 30 MIN: CPT
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
## 2024-04-29 ENCOUNTER — CLINICAL SUPPORT (OUTPATIENT)
Dept: REHABILITATION | Facility: HOSPITAL | Age: 39
End: 2024-04-29
Payer: MEDICAID

## 2024-04-29 DIAGNOSIS — Z74.09 DECREASED FUNCTIONAL MOBILITY AND ENDURANCE: ICD-10-CM

## 2024-04-29 DIAGNOSIS — R29.898 DECREASED STRENGTH OF UPPER EXTREMITY: Primary | ICD-10-CM

## 2024-04-29 DIAGNOSIS — M25.612 DECREASED RANGE OF MOTION OF LEFT SHOULDER: ICD-10-CM

## 2024-04-29 PROCEDURE — 97110 THERAPEUTIC EXERCISES: CPT

## 2024-04-29 NOTE — PROGRESS NOTES
[facility]   Physical Therapy Treatment Note       Name: Marco Antonio Cisse  Clinic Number: [id number]    Therapy Diagnosis:   Encounter Diagnoses   Name Primary?    Decreased strength of upper extremity Yes    Decreased range of motion of left shoulder     Decreased functional mobility and endurance      Physician: Saloni Calvin,*    Visit Date: 4/29/2024    Physician Orders: Physical Therapy Evaluation and Treat  Medical Diagnosis from Referral: upper back pain  Evaluation Date: 4/22/2024  Authorization Period Expiration: 04/17/2026  Plan of Care Expiration: 07/15/2024  Progress Note Due: 05/22/2024  Visit # / Visits authorized: 2/20   FOTO: 1/3      Precautions: Standard    PTA Visit #: 0/5     Time In: 7:15 AM  Time Out: 8:00 AM  Total Billable Time: 30 minutes (Billing reflects 1 on 1 treatment time spent with patient)  Billing reflects [place] medicaid guidelines, billing all therapy as therapeutic-exercise    SUBJECTIVE     Patient reports: she felt good after the initial evaluation and experienced decreased pain for a couple days. Patient states her pain began to return on Saturday. Patient states she has two final exams today and then will be finished with the semester after that.  She was compliant with home exercise program.  Response to previous treatment: good, decreased pain for a couple days  Functional change: pain with any activity using left arm, putting clothes on     Pain: 6/10  Location: left mid thoracic/scapula     OBJECTIVE     Objective Measures updated at progress report unless specified.       Treatment     Marco Antonio received the treatments listed below:      MANUAL THERAPY TECHNIQUES were applied for 6 minutes, including:     Manual Intervention Performed Today     Soft Tissue Mobilization       Joint Mobilizations       Mobilization with movement       Cupping x Left rhomboids and middle and lower trapezius   Functional Dry Needling            Plan for Next Visit: PRN         THERAPEUTIC EXERCISES to develop strength, endurance, ROM, flexibility, posture, and core stabilization for (39) minutes including:     Intervention Performed Today     UPPER BODY ERGOMETER  x 3 minutes forward, 3 minutes backwards   Pulley's x 3 minutes flexion    Scapular Retractions  x  3 x 10 green band   Shoulder Extensions x  3 x 10 red band   Open Books x 2 minutes each bilateral   Series 6  Bear Hugs  Swimmers  Serratus Punches    X  X  x    2 minutes  2 minutes  2 minutes                        Plan for Next Visit: progress as tolerated        Patient Education and Home Exercises     Home Exercises Provided and Patient Education Provided     Education provided:   PURPOSE: Patient educated on the impairments noted above and the effects of physical therapy intervention to improve overall condition and QOL.   EXERCISE: Patient was educated on all the above exercise prior/during/after for proper posture, positioning, and execution for safe performance with home exercise program.   STRENGTH: Patient educated on the importance of improved core and extremity strength in order to improve alignment of the spine and extremities with static positions and dynamic movement.     Written Home Exercises Provided: Patient instructed to cont prior HEP. Exercises were reviewed and Marco Antonio was able to demonstrate them prior to the end of the session.  Marco Antonio demonstrated good  understanding of the education provided. See EMR under Patient Instructions for exercises provided during therapy sessions    ASSESSMENT   Patient tolerated first treatment session well. Due to keloid scars on left upper extremity, patient with decreased RANGE OF MOTION noted with pulleys and series 6 activities on that side. However, patient was able to maintain good form and also tolerated scapular strengthening exercises well. Patient again responded well to cupping and left session with no complaints of increased  pain.    Marco Antonio Is progressing well towards her goals.   Patient prognosis is Good.     Patient will continue to benefit from skilled outpatient physical therapy to address the deficits listed in the problem list box on initial evaluation, provide patient/family education and to maximize patient's level of independence in the home and community environment.     Patient's spiritual, cultural and educational needs considered and patient agreeable to plan of care and goals.     Anticipated barriers to physical therapy: co-morbidities and occupation     Goals:   Reviewed:4/29/2024      Short Term Goals:  6 weeks Status  Date Met   PAIN: Patient will report worst pain of 5/10 in order to progress toward max functional ability and improve quality of life. [x] Progressing  [] Met  [] Not Met     FUNCTION: Patient will demonstrate improved function as indicated by a functional intake score of more than or equal to 56 out of 100 on FOTO. [x] Progressing  [] Met  [] Not Met     MOBILITY: Patient will improve Range of Motion to 50% of stated goals, listed in objective measures above, in order to progress towards independence with functional activities.  [x] Progressing  [] Met  [] Not Met     STRENGTH: Patient will improve strength to 50% of stated goals, listed in objective measures above, in order to progress towards independence with functional activities.  [x] Progressing  [] Met  [] Not Met        Long Term Goals:  12 weeks Status Date Met   PAIN: Patient will report worst pain of 2/10 in order to progress toward max functional ability and improve quality of life [x] Progressing  [] Met  [] Not Met     FUNCTION: Patient will demonstrate improved function as indicated by a functional intake score of more than or equal to 68 out of 100 on FOTO. [x] Progressing  [] Met  [] Not Met     MOBILITY: Patient will improve Range of Motion to stated goals, listed in objective measures above, in order to return to maximal functional  potential and improve quality of life. [x] Progressing  [] Met  [] Not Met     STRENGTH: Patient will improve strength to stated goals, listed in objective measures above, in order to improve functional independence and quality of life. [x] Progressing  [] Met  [] Not Met     Patient will return to normal ADL's, IADL's, community involvement, recreational activities, and work-related activities with less than or equal to 2/10 pain and maximal function.  [x] Progressing  [] Met  [] Not Met          PLAN     Continue Plan of Care (POC) and progress per patient tolerance. See treatment section for details on planned progressions next session.  Plan of Care Due: 07/15/2024     Claire Wynne, PT

## 2024-05-06 ENCOUNTER — CLINICAL SUPPORT (OUTPATIENT)
Dept: REHABILITATION | Facility: HOSPITAL | Age: 39
End: 2024-05-06
Payer: MEDICAID

## 2024-05-06 DIAGNOSIS — Z74.09 DECREASED FUNCTIONAL MOBILITY AND ENDURANCE: ICD-10-CM

## 2024-05-06 DIAGNOSIS — R29.898 DECREASED STRENGTH OF UPPER EXTREMITY: Primary | ICD-10-CM

## 2024-05-06 DIAGNOSIS — M25.612 DECREASED RANGE OF MOTION OF LEFT SHOULDER: ICD-10-CM

## 2024-05-06 PROCEDURE — 97110 THERAPEUTIC EXERCISES: CPT

## 2024-05-06 NOTE — PROGRESS NOTES
[facility]   Physical Therapy Treatment Note       Name: Marco Antonio Cisse  Clinic Number: [id number]    Therapy Diagnosis:   Encounter Diagnoses   Name Primary?    Decreased strength of upper extremity Yes    Decreased range of motion of left shoulder     Decreased functional mobility and endurance      Physician: Saloni Calvin,*    Visit Date: 5/6/2024    Physician Orders: Physical Therapy Evaluation and Treat  Medical Diagnosis from Referral: upper back pain  Evaluation Date: 4/22/2024  Authorization Period Expiration: 04/17/2026  Plan of Care Expiration: 07/15/2024  Progress Note Due: 05/22/2024  Visit # / Visits authorized: 3/20   FOTO: 1/3      Precautions: Standard    PTA Visit #: 0/5     Time In: 7:15 AM  Time Out: 8:00 AM  Total Billable Time: 30 minutes (Billing reflects 1 on 1 treatment time spent with patient)  Billing reflects [place] medicaid guidelines, billing all therapy as therapeutic-exercise    SUBJECTIVE     Patient reports: she feels good after her therapy appointments, but the pain still returns a few days later.  She was compliant with home exercise program.  Response to previous treatment: good, decreased pain for a couple days  Functional change: pain with any activity using left arm, putting clothes on     Pain: 6/10  Location: left mid thoracic/scapula     OBJECTIVE     Objective Measures updated at progress report unless specified.       Treatment     Marco Antonio received the treatments listed below:      MANUAL THERAPY TECHNIQUES were applied for 9 minutes, including:     Manual Intervention Performed Today     Soft Tissue Mobilization  x  Left rhomboids and middle and lower trapezius   Joint Mobilizations       Mobilization with movement       Cupping  Left rhomboids and middle and lower trapezius   Functional Dry Needling           Plan for Next Visit: PRN      THERAPEUTIC EXERCISES to develop strength, endurance, ROM, flexibility, posture,  and core stabilization for (36) minutes including:     Intervention Performed Today     UPPER BODY ERGOMETER  x 3 minutes forward, 3 minutes backwards   Pulley's x 3 minutes flexion    Scapular Retractions  x 3 x 10 green band   Shoulder Extensions x  3 x 10 green band   Open Books x 2 minutes each bilateral   Series 6  Bear Hugs  Swimmers  Serratus Punches    X  X  x    2 minutes  2 minutes  2 minutes      Prone T's  Prone I's  x  x  2 x 10 bilateral  2 x 10 bilateral              Plan for Next Visit: progress as tolerated        Patient Education and Home Exercises     Home Exercises Provided and Patient Education Provided     Education provided:   PURPOSE: Patient educated on the impairments noted above and the effects of physical therapy intervention to improve overall condition and QOL.   EXERCISE: Patient was educated on all the above exercise prior/during/after for proper posture, positioning, and execution for safe performance with home exercise program.   STRENGTH: Patient educated on the importance of improved core and extremity strength in order to improve alignment of the spine and extremities with static positions and dynamic movement.     Written Home Exercises Provided: Patient instructed to cont prior HEP. Exercises were reviewed and Marco Antonio was able to demonstrate them prior to the end of the session.  Marco Antonio demonstrated good  understanding of the education provided. See EMR under Patient Instructions for exercises provided during therapy sessions    ASSESSMENT   Progressed patient with prone strengthening exercises and patient tolerated this well, but did present with more weakness and decreased mobility on left side. Patient's lack of full mobility on left upper extremity may be contributing to some of her pain. Patient tolerated manual therapy and left session without reports of increased pain.    Marco Antonio Is progressing well towards her goals.   Patient prognosis is Good.     Patient will  continue to benefit from skilled outpatient physical therapy to address the deficits listed in the problem list box on initial evaluation, provide patient/family education and to maximize patient's level of independence in the home and community environment.     Patient's spiritual, cultural and educational needs considered and patient agreeable to plan of care and goals.     Anticipated barriers to physical therapy: co-morbidities and occupation     Goals:   Reviewed:5/6/2024      Short Term Goals:  6 weeks Status  Date Met   PAIN: Patient will report worst pain of 5/10 in order to progress toward max functional ability and improve quality of life. [x] Progressing  [] Met  [] Not Met     FUNCTION: Patient will demonstrate improved function as indicated by a functional intake score of more than or equal to 56 out of 100 on FOTO. [x] Progressing  [] Met  [] Not Met     MOBILITY: Patient will improve Range of Motion to 50% of stated goals, listed in objective measures above, in order to progress towards independence with functional activities.  [x] Progressing  [] Met  [] Not Met     STRENGTH: Patient will improve strength to 50% of stated goals, listed in objective measures above, in order to progress towards independence with functional activities.  [x] Progressing  [] Met  [] Not Met        Long Term Goals:  12 weeks Status Date Met   PAIN: Patient will report worst pain of 2/10 in order to progress toward max functional ability and improve quality of life [x] Progressing  [] Met  [] Not Met     FUNCTION: Patient will demonstrate improved function as indicated by a functional intake score of more than or equal to 68 out of 100 on FOTO. [x] Progressing  [] Met  [] Not Met     MOBILITY: Patient will improve Range of Motion to stated goals, listed in objective measures above, in order to return to maximal functional potential and improve quality of life. [x] Progressing  [] Met  [] Not Met     STRENGTH: Patient will  improve strength to stated goals, listed in objective measures above, in order to improve functional independence and quality of life. [x] Progressing  [] Met  [] Not Met     Patient will return to normal ADL's, IADL's, community involvement, recreational activities, and work-related activities with less than or equal to 2/10 pain and maximal function.  [x] Progressing  [] Met  [] Not Met          PLAN     Continue Plan of Care (POC) and progress per patient tolerance. See treatment section for details on planned progressions next session.  Plan of Care Due: 07/15/2024     Claire Wynne, PT

## 2024-05-07 ENCOUNTER — CLINICAL SUPPORT (OUTPATIENT)
Dept: REHABILITATION | Facility: HOSPITAL | Age: 39
End: 2024-05-07
Payer: MEDICAID

## 2024-05-07 DIAGNOSIS — R29.898 DECREASED STRENGTH OF UPPER EXTREMITY: Primary | ICD-10-CM

## 2024-05-07 DIAGNOSIS — M25.612 DECREASED RANGE OF MOTION OF LEFT SHOULDER: ICD-10-CM

## 2024-05-07 DIAGNOSIS — Z74.09 DECREASED FUNCTIONAL MOBILITY AND ENDURANCE: ICD-10-CM

## 2024-05-07 PROCEDURE — 97110 THERAPEUTIC EXERCISES: CPT

## 2024-05-07 NOTE — PROGRESS NOTES
[facility]   Physical Therapy Treatment Note       Name: Marco Antonio Cisse  Clinic Number: [id number]    Therapy Diagnosis:   Encounter Diagnoses   Name Primary?    Decreased strength of upper extremity Yes    Decreased range of motion of left shoulder     Decreased functional mobility and endurance      Physician: Saloni Calvin,*    Visit Date: 5/7/2024    Physician Orders: Physical Therapy Evaluation and Treat  Medical Diagnosis from Referral: upper back pain  Evaluation Date: 4/22/2024  Authorization Period Expiration: 04/17/2026  Plan of Care Expiration: 07/15/2024  Progress Note Due: 05/22/2024  Visit # / Visits authorized: 4/20   FOTO: 1/3      Precautions: Standard    PTA Visit #: 0/5     Time In: 7:15 AM  Time Out: 8:00 AM  Total Billable Time: 30 minutes (Billing reflects 1 on 1 treatment time spent with patient)  Billing reflects [place] medicaid guidelines, billing all therapy as therapeutic-exercise    SUBJECTIVE     Patient reports: she felt good after last visit but when she woke up her pain returned. Patient states she feels good after therapy but it's short lived.  She was compliant with home exercise program.  Response to previous treatment: good, decreased pain for a couple days  Functional change: pain with any activity using left arm, putting clothes on     Pain: 6/10  Location: left mid thoracic/scapula     OBJECTIVE     Objective Measures updated at progress report unless specified.       Treatment     Marco Antonio received the treatments listed below:      MANUAL THERAPY TECHNIQUES were applied for 9 minutes, including:     Manual Intervention Performed Today     Soft Tissue Mobilization  x  Left rhomboids and middle and lower trapezius   Joint Mobilizations       Mobilization with movement       Cupping  Left rhomboids and middle and lower trapezius   Functional Dry Needling           Plan for Next Visit: PRN      THERAPEUTIC EXERCISES to develop  strength, endurance, ROM, flexibility, posture, and core stabilization for (36) minutes including:     Intervention Performed Today     UPPER BODY ERGOMETER  x 3 minutes forward, 3 minutes backwards   Pulley's x 3 minutes flexion    Scapular Retractions  x 3 x 10 green band   Shoulder Extensions x  3 x 10 green band   Open Books x 2 minutes each bilateral   Series 6  Bear Hugs  Swimmers  Serratus Punches    X  X  x    2 minutes  2 minutes  2 minutes     Prone T's  Prone I's  Prone Y's  x  X  x  2 x 10 bilateral  2 x 10 bilateral  2 x 10 bilateral   Foam Roll Thoracic Extension  x  2 minutes      Plan for Next Visit: progress as tolerated        Patient Education and Home Exercises     Home Exercises Provided and Patient Education Provided     Education provided:   PURPOSE: Patient educated on the impairments noted above and the effects of physical therapy intervention to improve overall condition and QOL.   EXERCISE: Patient was educated on all the above exercise prior/during/after for proper posture, positioning, and execution for safe performance with home exercise program.   STRENGTH: Patient educated on the importance of improved core and extremity strength in order to improve alignment of the spine and extremities with static positions and dynamic movement.     Written Home Exercises Provided: Patient instructed to cont prior HEP. Exercises were reviewed and Marco Antonio was able to demonstrate them prior to the end of the session.  Marco Antonio demonstrated good  understanding of the education provided. See EMR under Patient Instructions for exercises provided during therapy sessions    ASSESSMENT   Progressed patient again today with lower trapezius strengthening. Patient tolerated this well but did exhibit more weakness with this muscle group compared to middle trapezius. Patient tolerated SOFT TISSUE MOBILIZATION well again today and left session with no complaints of increased discomfort.     Marco Antonio Is  progressing well towards her goals.   Patient prognosis is Good.     Patient will continue to benefit from skilled outpatient physical therapy to address the deficits listed in the problem list box on initial evaluation, provide patient/family education and to maximize patient's level of independence in the home and community environment.     Patient's spiritual, cultural and educational needs considered and patient agreeable to plan of care and goals.     Anticipated barriers to physical therapy: co-morbidities and occupation     Goals:   Reviewed:5/7/2024      Short Term Goals:  6 weeks Status  Date Met   PAIN: Patient will report worst pain of 5/10 in order to progress toward max functional ability and improve quality of life. [x] Progressing  [] Met  [] Not Met     FUNCTION: Patient will demonstrate improved function as indicated by a functional intake score of more than or equal to 56 out of 100 on FOTO. [x] Progressing  [] Met  [] Not Met     MOBILITY: Patient will improve Range of Motion to 50% of stated goals, listed in objective measures above, in order to progress towards independence with functional activities.  [x] Progressing  [] Met  [] Not Met     STRENGTH: Patient will improve strength to 50% of stated goals, listed in objective measures above, in order to progress towards independence with functional activities.  [x] Progressing  [] Met  [] Not Met        Long Term Goals:  12 weeks Status Date Met   PAIN: Patient will report worst pain of 2/10 in order to progress toward max functional ability and improve quality of life [x] Progressing  [] Met  [] Not Met     FUNCTION: Patient will demonstrate improved function as indicated by a functional intake score of more than or equal to 68 out of 100 on FOTO. [x] Progressing  [] Met  [] Not Met     MOBILITY: Patient will improve Range of Motion to stated goals, listed in objective measures above, in order to return to maximal functional potential and improve  quality of life. [x] Progressing  [] Met  [] Not Met     STRENGTH: Patient will improve strength to stated goals, listed in objective measures above, in order to improve functional independence and quality of life. [x] Progressing  [] Met  [] Not Met     Patient will return to normal ADL's, IADL's, community involvement, recreational activities, and work-related activities with less than or equal to 2/10 pain and maximal function.  [x] Progressing  [] Met  [] Not Met          PLAN     Continue Plan of Care (POC) and progress per patient tolerance. See treatment section for details on planned progressions next session.  Plan of Care Due: 07/15/2024     Claire Wynne, PT

## 2024-05-13 ENCOUNTER — CLINICAL SUPPORT (OUTPATIENT)
Dept: REHABILITATION | Facility: HOSPITAL | Age: 39
End: 2024-05-13
Payer: MEDICAID

## 2024-05-13 DIAGNOSIS — M25.612 DECREASED RANGE OF MOTION OF LEFT SHOULDER: ICD-10-CM

## 2024-05-13 DIAGNOSIS — Z74.09 DECREASED FUNCTIONAL MOBILITY AND ENDURANCE: ICD-10-CM

## 2024-05-13 DIAGNOSIS — R29.898 DECREASED STRENGTH OF UPPER EXTREMITY: Primary | ICD-10-CM

## 2024-05-13 PROCEDURE — 97110 THERAPEUTIC EXERCISES: CPT

## 2024-05-13 NOTE — PROGRESS NOTES
[facility]   Physical Therapy Treatment Note       Name: Marco Antonio Cisse  Clinic Number: [id number]    Therapy Diagnosis:   Encounter Diagnoses   Name Primary?    Decreased strength of upper extremity Yes    Decreased range of motion of left shoulder     Decreased functional mobility and endurance      Physician: Saloni Calvin,*    Visit Date: 5/13/2024    Physician Orders: Physical Therapy Evaluation and Treat  Medical Diagnosis from Referral: upper back pain  Evaluation Date: 4/22/2024  Authorization Period Expiration: 04/17/2026  Plan of Care Expiration: 07/15/2024  Progress Note Due: 05/22/2024  Visit # / Visits authorized: 5/20   FOTO: 1/3      Precautions: Standard    PTA Visit #: 0/5     Time In: 10:10 AM  Time Out: 10:45 AM  Total Billable Time: 25 minutes (Billing reflects 1 on 1 treatment time spent with patient)  Billing reflects [place] medicaid guidelines, billing all therapy as therapeutic-exercise    SUBJECTIVE     Patient reports: she went to get a massage before coming to therapy today and patient states she is feeling great because of this.   She was compliant with home exercise program.  Response to previous treatment: good, decreased pain for a couple days  Functional change: pain with any activity using left arm, putting clothes on     Pain: NT/10  Location: left mid thoracic/scapula     OBJECTIVE     Objective Measures updated at progress report unless specified.     Treatment     Marco Antonio received the treatments listed below:      MANUAL THERAPY TECHNIQUES were applied for 0 minutes, including:     Manual Intervention Performed Today     Soft Tissue Mobilization    Left rhomboids and middle and lower trapezius   Joint Mobilizations       Mobilization with movement       Cupping  Left rhomboids and middle and lower trapezius   Functional Dry Needling           Plan for Next Visit: PRN      THERAPEUTIC EXERCISES to develop strength, endurance,  ROM, flexibility, posture, and core stabilization for (35) minutes including:     Intervention Performed Today     UPPER BODY ERGOMETER  x 3 minutes forward, 3 minutes backwards   Pulley's x 3 minutes flexion    Scapular Retractions  x 3 x 10 green band   Shoulder Extensions x  3 x 10 green band   Open Books x 2 minutes each bilateral   Series 6  Bear Hugs  Swimmers  Serratus Punches    X  X  x    2 minutes  2 minutes  2 minutes     Prone T's  Prone I's  Prone Y's  x  X  x 3 x 10 bilateral  3 x 10 bilateral  3 x 10 bilateral   Foam Roll Thoracic Extension    2 minutes      Plan for Next Visit: progress as tolerated        Patient Education and Home Exercises     Home Exercises Provided and Patient Education Provided     Education provided:   PURPOSE: Patient educated on the impairments noted above and the effects of physical therapy intervention to improve overall condition and QOL.   EXERCISE: Patient was educated on all the above exercise prior/during/after for proper posture, positioning, and execution for safe performance with home exercise program.   STRENGTH: Patient educated on the importance of improved core and extremity strength in order to improve alignment of the spine and extremities with static positions and dynamic movement.     Written Home Exercises Provided: Patient instructed to cont prior HEP. Exercises were reviewed and Marco Antonio was able to demonstrate them prior to the end of the session.  Marco Antonio demonstrated good  understanding of the education provided. See EMR under Patient Instructions for exercises provided during therapy sessions    ASSESSMENT   Progressed patient with increased reps performed on prone scapular strengthening activities. Patient tolerated this well, possibly due to having massage performed before visit. Patient left session without any complaints of increased pain. Deferred manual therapy due to patient having massage before today's session.    Marco Antonio Is progressing  well towards her goals.   Patient prognosis is Good.     Patient will continue to benefit from skilled outpatient physical therapy to address the deficits listed in the problem list box on initial evaluation, provide patient/family education and to maximize patient's level of independence in the home and community environment.     Patient's spiritual, cultural and educational needs considered and patient agreeable to plan of care and goals.     Anticipated barriers to physical therapy: co-morbidities and occupation     Goals:   Reviewed:5/13/2024      Short Term Goals:  6 weeks Status  Date Met   PAIN: Patient will report worst pain of 5/10 in order to progress toward max functional ability and improve quality of life. [x] Progressing  [] Met  [] Not Met     FUNCTION: Patient will demonstrate improved function as indicated by a functional intake score of more than or equal to 56 out of 100 on FOTO. [x] Progressing  [] Met  [] Not Met     MOBILITY: Patient will improve Range of Motion to 50% of stated goals, listed in objective measures above, in order to progress towards independence with functional activities.  [x] Progressing  [] Met  [] Not Met     STRENGTH: Patient will improve strength to 50% of stated goals, listed in objective measures above, in order to progress towards independence with functional activities.  [x] Progressing  [] Met  [] Not Met        Long Term Goals:  12 weeks Status Date Met   PAIN: Patient will report worst pain of 2/10 in order to progress toward max functional ability and improve quality of life [x] Progressing  [] Met  [] Not Met     FUNCTION: Patient will demonstrate improved function as indicated by a functional intake score of more than or equal to 68 out of 100 on FOTO. [x] Progressing  [] Met  [] Not Met     MOBILITY: Patient will improve Range of Motion to stated goals, listed in objective measures above, in order to return to maximal functional potential and improve quality of  life. [x] Progressing  [] Met  [] Not Met     STRENGTH: Patient will improve strength to stated goals, listed in objective measures above, in order to improve functional independence and quality of life. [x] Progressing  [] Met  [] Not Met     Patient will return to normal ADL's, IADL's, community involvement, recreational activities, and work-related activities with less than or equal to 2/10 pain and maximal function.  [x] Progressing  [] Met  [] Not Met          PLAN     Continue Plan of Care (POC) and progress per patient tolerance. See treatment section for details on planned progressions next session.  Plan of Care Due: 07/15/2024     Claire Wynne, PT

## 2024-06-10 ENCOUNTER — OFFICE VISIT (OUTPATIENT)
Dept: FAMILY MEDICINE | Facility: CLINIC | Age: 39
End: 2024-06-10
Payer: MEDICAID

## 2024-06-10 VITALS
HEART RATE: 68 BPM | BODY MASS INDEX: 31.15 KG/M2 | RESPIRATION RATE: 20 BRPM | WEIGHT: 193.81 LBS | SYSTOLIC BLOOD PRESSURE: 116 MMHG | HEIGHT: 66 IN | DIASTOLIC BLOOD PRESSURE: 82 MMHG | TEMPERATURE: 99 F | OXYGEN SATURATION: 97 %

## 2024-06-10 DIAGNOSIS — R51.9 NONINTRACTABLE HEADACHE, UNSPECIFIED CHRONICITY PATTERN, UNSPECIFIED HEADACHE TYPE: Primary | ICD-10-CM

## 2024-06-10 PROCEDURE — 3008F BODY MASS INDEX DOCD: CPT | Mod: CPTII,,, | Performed by: NURSE PRACTITIONER

## 2024-06-10 PROCEDURE — 1159F MED LIST DOCD IN RCRD: CPT | Mod: CPTII,,, | Performed by: NURSE PRACTITIONER

## 2024-06-10 PROCEDURE — 1160F RVW MEDS BY RX/DR IN RCRD: CPT | Mod: CPTII,,, | Performed by: NURSE PRACTITIONER

## 2024-06-10 PROCEDURE — 99213 OFFICE O/P EST LOW 20 MIN: CPT | Mod: PBBFAC,PO | Performed by: NURSE PRACTITIONER

## 2024-06-10 PROCEDURE — 99214 OFFICE O/P EST MOD 30 MIN: CPT | Mod: S$PBB,,, | Performed by: NURSE PRACTITIONER

## 2024-06-10 PROCEDURE — 3079F DIAST BP 80-89 MM HG: CPT | Mod: CPTII,,, | Performed by: NURSE PRACTITIONER

## 2024-06-10 PROCEDURE — 99999 PR PBB SHADOW E&M-EST. PATIENT-LVL III: CPT | Mod: PBBFAC,,, | Performed by: NURSE PRACTITIONER

## 2024-06-10 PROCEDURE — 3074F SYST BP LT 130 MM HG: CPT | Mod: CPTII,,, | Performed by: NURSE PRACTITIONER

## 2024-06-10 RX ORDER — BUTALBITAL, ACETAMINOPHEN AND CAFFEINE 50; 325; 40 MG/1; MG/1; MG/1
1 TABLET ORAL EVERY 4 HOURS PRN
Qty: 60 TABLET | Refills: 2 | Status: SHIPPED | OUTPATIENT
Start: 2024-06-10 | End: 2024-07-10

## 2024-06-10 NOTE — PROGRESS NOTES
Marco Antonio Cisse  06/10/2024  [id number]    Benito Alvarez MD  Patient Care Team:  Benito Alvarez MD as PCP - General (Internal Medicine)  Severo Gonzalez MD as Obstetrician (Obstetrics)  Destini Lawson RD as Dietitian (Endocrinology)  Randal Jain, NP as Nurse Practitioner (Family Medicine)          Visit Type:an urgent visit for a new problem    Chief Complaint:  Chief Complaint   Patient presents with    Headache       History of Present Illness:    39 yo female presents with co HA for three days.  Reports having a history of migraines but has not had one in 3 years.  No OTC meds for symptom relief     History:  Past Medical History:   Diagnosis Date    Breast pain     Family history of malignant neoplasm of breast 02/14/2024    Fibroadenoma of right breast     Hand swelling     History of swelling of feet     IBS (irritable bowel syndrome)     Insulin resistance syndrome     Migraines     Vitamin D deficiency disease      Past Surgical History:   Procedure Laterality Date    COLONOSCOPY N/A 10/07/2015    Procedure: COLONOSCOPY;  Surgeon: Kris Barker MD;  Location: [facility];  Service: Endoscopy;  Laterality: N/A;    ENDOMETRIAL ABLATION  03/05/2014    Novasure    KELOID EXCISION      LAPAROSCOPIC SLEEVE GASTRECTOMY  08/25/2021    Right breast sonocore biopsy 2/17/16      SHOULDER ARTHROSCOPY      TUBAL LIGATION Bilateral 03/05/2014     Family History   Problem Relation Name Age of Onset    Hypertension Mother      Arthritis Mother      Diabetes Father      Hypertension Father      Stroke Father      Colon polyps Father      Arthritis Maternal Aunt      Breast cancer Paternal Aunt  60 - 69    Lupus Cousin      Cancer Neg Hx      Ovarian cancer Neg Hx       Social History     Socioeconomic History    Marital status:     Number of children: 2   Occupational History    Occupation:      Employer: [employer]   Tobacco Use    Smoking  status: Never    Smokeless tobacco: Never   Substance and Sexual Activity    Alcohol use: No     Alcohol/week: 0.0 standard drinks of alcohol    Drug use: No    Sexual activity: Yes     Birth control/protection: Surgical     Social Determinants of Health     Stress: No Stress Concern Present (9/10/2019)    [employer] - Occupational Stress Questionnaire     Feeling of Stress : Not at all     Patient Active Problem List   Diagnosis    Migraine headache    Hypermenorrhea    Vitamin D deficiency disease    Insulin resistance    Irritable bowel syndrome (IBS)    Fluid retention    Panic anxiety syndrome    Fibroadenoma of right breast    Family history of malignant neoplasm of breast    Breast lump on right side at 12 o'clock position    Decreased strength of upper extremity    Decreased range of motion of left shoulder    Decreased functional mobility and endurance     Review of patient's allergies indicates:   Allergen Reactions    Bactrim ds [sulfamethoxazole-trimethoprim] Nausea And Vomiting       The following were reviewed at this visit: active problem list, medication list, allergies, family history, social history, and health maintenance.    Medications:  Current Outpatient Medications on File Prior to Visit   Medication Sig Dispense Refill    albuterol (PROVENTIL/VENTOLIN HFA) 90 mcg/actuation inhaler Inhale 2 puffs into the lungs every 4 to 6 hours as needed for Wheezing or Shortness of Breath. 8.5 g 2    cyclobenzaprine (FLEXERIL) 10 MG tablet Take 1 tablet (10 mg total) by mouth every evening. 20 tablet 0    sertraline (ZOLOFT) 25 MG tablet Take 1 tablet (25 mg total) by mouth once daily. 30 tablet 11    EPINEPHrine (EPIPEN) 0.3 mg/0.3 mL AtIn Inject 0.3 mLs (0.3 mg total) into the muscle once. for 1 dose 2 each PRN     No current facility-administered medications on file prior to visit.       Medications have been reviewed and reconciled with patient at this visit.  Barriers to  medications reviewed with patient.    Adverse reactions to current medications reviewed with patient..    Over the counter medications reviewed and reconciled with patient.    Exam:  Wt Readings from Last 3 Encounters:   06/10/24 87.9 kg (193 lb 12.6 oz)   04/16/24 90 kg (198 lb 6.6 oz)   02/19/24 88.5 kg (195 lb 1.7 oz)     Temp Readings from Last 3 Encounters:   06/10/24 98.7 °F (37.1 °C) (Tympanic)   04/16/24 97.5 °F (36.4 °C) (Tympanic)   02/19/24 96.7 °F (35.9 °C) (Tympanic)     BP Readings from Last 3 Encounters:   06/10/24 116/82   04/16/24 118/72   02/19/24 128/84     Pulse Readings from Last 3 Encounters:   06/10/24 68   04/16/24 66   02/19/24 76     Body mass index is 31.28 kg/m².      Review of Systems   Constitutional:  Negative for fever.   Respiratory:  Negative for cough, shortness of breath and wheezing.    Cardiovascular:  Negative for chest pain and palpitations.   Gastrointestinal:  Negative for nausea.   Neurological:  Positive for headaches. Negative for speech change and weakness.   All other systems reviewed and are negative.    Physical Exam  Vitals and nursing note reviewed.   Constitutional:       Appearance: Normal appearance. She is normal weight.   HENT:      Head: Normocephalic and atraumatic.      Right Ear: Tympanic membrane, ear canal and external ear normal.      Left Ear: Tympanic membrane, ear canal and external ear normal.      Nose: Nose normal.      Mouth/Throat:      Mouth: Mucous membranes are moist.      Pharynx: Oropharynx is clear.   Eyes:      Extraocular Movements: Extraocular movements intact.      Conjunctiva/sclera: Conjunctivae normal.      Pupils: Pupils are equal, round, and reactive to light.   Cardiovascular:      Rate and Rhythm: Normal rate and regular rhythm.      Pulses: Normal pulses.      Heart sounds: Normal heart sounds.   Pulmonary:      Effort: Pulmonary effort is normal.      Breath sounds: Normal breath sounds.   Abdominal:      General: Bowel sounds  are normal.      Palpations: Abdomen is soft.   Musculoskeletal:         General: Normal range of motion.      Cervical back: Normal range of motion and neck supple.   Skin:     General: Skin is warm and dry.      Capillary Refill: Capillary refill takes less than 2 seconds.   Neurological:      General: No focal deficit present.      Mental Status: She is alert and oriented to person, place, and time.   Psychiatric:         Mood and Affect: Mood normal.         Behavior: Behavior normal.         Thought Content: Thought content normal.         Judgment: Judgment normal.         Laboratory Reviewed ({Yes)  Lab Results   Component Value Date    WBC 4.91 06/28/2023    HGB 13.0 06/28/2023    HCT 41.9 06/28/2023     06/28/2023    CHOL 232 (H) 11/28/2018    TRIG 83 11/28/2018    HDL 64 11/28/2018    ALT 21 06/28/2023    AST 21 06/28/2023     06/28/2023    K 4.1 06/28/2023     06/28/2023    CREATININE 0.7 06/28/2023    BUN 10 06/28/2023    CO2 24 06/28/2023    TSH 0.75 01/31/2023    INR 0.97 08/10/2021    HGBA1C 5.2 01/31/2023       Marco Antonio was seen today for headache.    Diagnoses and all orders for this visit:    Nonintractable headache, unspecified chronicity pattern, unspecified headache type        PLAN   Start fiorcet         Care Plan/Goals: Reviewed    Goals    None     Marco Antonio was seen today for headache.    Diagnoses and all orders for this visit:    Nonintractable headache, unspecified chronicity pattern, unspecified headache type         Follow up: Follow up if symptoms worsen or fail to improve.    After visit summary was printed and given to patient upon discharge today.  Patient goals and care plan are included in After Visit Summary.

## 2024-06-19 DIAGNOSIS — F32.A DEPRESSION, UNSPECIFIED DEPRESSION TYPE: ICD-10-CM

## 2024-06-19 RX ORDER — SERTRALINE HYDROCHLORIDE 25 MG/1
25 TABLET, FILM COATED ORAL
Qty: 90 TABLET | Refills: 3 | Status: SHIPPED | OUTPATIENT
Start: 2024-06-19

## 2024-07-03 ENCOUNTER — OFFICE VISIT (OUTPATIENT)
Dept: FAMILY MEDICINE | Facility: CLINIC | Age: 39
End: 2024-07-03
Payer: MEDICAID

## 2024-07-03 VITALS
SYSTOLIC BLOOD PRESSURE: 110 MMHG | HEIGHT: 66 IN | HEART RATE: 83 BPM | WEIGHT: 195.56 LBS | DIASTOLIC BLOOD PRESSURE: 70 MMHG | RESPIRATION RATE: 18 BRPM | BODY MASS INDEX: 31.43 KG/M2 | OXYGEN SATURATION: 98 % | TEMPERATURE: 98 F

## 2024-07-03 DIAGNOSIS — T78.40XA ALLERGIC REACTION, INITIAL ENCOUNTER: Primary | ICD-10-CM

## 2024-07-03 PROCEDURE — 1160F RVW MEDS BY RX/DR IN RCRD: CPT | Mod: CPTII,,, | Performed by: NURSE PRACTITIONER

## 2024-07-03 PROCEDURE — 99214 OFFICE O/P EST MOD 30 MIN: CPT | Mod: S$PBB,,, | Performed by: NURSE PRACTITIONER

## 2024-07-03 PROCEDURE — 3008F BODY MASS INDEX DOCD: CPT | Mod: CPTII,,, | Performed by: NURSE PRACTITIONER

## 2024-07-03 PROCEDURE — 99999 PR PBB SHADOW E&M-EST. PATIENT-LVL III: CPT | Mod: PBBFAC,,, | Performed by: NURSE PRACTITIONER

## 2024-07-03 PROCEDURE — 1159F MED LIST DOCD IN RCRD: CPT | Mod: CPTII,,, | Performed by: NURSE PRACTITIONER

## 2024-07-03 PROCEDURE — 99213 OFFICE O/P EST LOW 20 MIN: CPT | Mod: PBBFAC,PO | Performed by: NURSE PRACTITIONER

## 2024-07-03 PROCEDURE — 3074F SYST BP LT 130 MM HG: CPT | Mod: CPTII,,, | Performed by: NURSE PRACTITIONER

## 2024-07-03 PROCEDURE — 3078F DIAST BP <80 MM HG: CPT | Mod: CPTII,,, | Performed by: NURSE PRACTITIONER

## 2024-07-03 NOTE — PROGRESS NOTES
Marco Antonio Cisse  07/03/2024  [id number]    Benito Alvarez MD  Patient Care Team:  Benito Alvarez MD as PCP - General (Internal Medicine)  Severo Gonzalez MD as Obstetrician (Obstetrics)  Destini Lawson RD as Dietitian (Endocrinology)  Randal Jain, NP as Nurse Practitioner (Family Medicine)          Visit Type:an urgent visit for a new problem    Chief Complaint:  Chief Complaint   Patient presents with    Allergic Reaction       History of Present Illness:    39 yo female Allergic reaction to sugar free oatmeal cookies Monday and reports facial, eye and lip swelling.   Reports taking xyzal for symptom  relief.  Denies swelling or difficulty swallowing or breathing today      History:  Past Medical History:   Diagnosis Date    Breast pain     Family history of malignant neoplasm of breast 02/14/2024    Fibroadenoma of right breast     Hand swelling     History of swelling of feet     IBS (irritable bowel syndrome)     Insulin resistance syndrome     Migraines     Vitamin D deficiency disease      Past Surgical History:   Procedure Laterality Date    COLONOSCOPY N/A 10/07/2015    Procedure: COLONOSCOPY;  Surgeon: Kris Barker MD;  Location: [facility];  Service: Endoscopy;  Laterality: N/A;    ENDOMETRIAL ABLATION  03/05/2014    Novasure    KELOID EXCISION      LAPAROSCOPIC SLEEVE GASTRECTOMY  08/25/2021    Right breast sonocore biopsy 2/17/16      SHOULDER ARTHROSCOPY      TUBAL LIGATION Bilateral 03/05/2014     Family History   Problem Relation Name Age of Onset    Hypertension Mother      Arthritis Mother      Diabetes Father      Hypertension Father      Stroke Father      Colon polyps Father      Arthritis Maternal Aunt      Breast cancer Paternal Aunt  60 - 69    Lupus Cousin      Cancer Neg Hx      Ovarian cancer Neg Hx       Social History     Socioeconomic History    Marital status:     Number of children: 2   Occupational History    Occupation:       Employer: [employer]   Tobacco Use    Smoking status: Never    Smokeless tobacco: Never   Substance and Sexual Activity    Alcohol use: No     Alcohol/week: 0.0 standard drinks of alcohol    Drug use: No    Sexual activity: Yes     Birth control/protection: Surgical     Social Determinants of Health     Stress: No Stress Concern Present (9/10/2019)    [employer] - Occupational Stress Questionnaire     Feeling of Stress : Not at all     Patient Active Problem List   Diagnosis    Migraine headache    Hypermenorrhea    Vitamin D deficiency disease    Insulin resistance    Irritable bowel syndrome (IBS)    Fluid retention    Panic anxiety syndrome    Fibroadenoma of right breast    Family history of malignant neoplasm of breast    Breast lump on right side at 12 o'clock position    Decreased strength of upper extremity    Decreased range of motion of left shoulder    Decreased functional mobility and endurance     Review of patient's allergies indicates:   Allergen Reactions    Bactrim ds [sulfamethoxazole-trimethoprim] Nausea And Vomiting       The following were reviewed at this visit: active problem list, medication list, allergies, family history, social history, and health maintenance.    Medications:  Current Outpatient Medications on File Prior to Visit   Medication Sig Dispense Refill    albuterol (PROVENTIL/VENTOLIN HFA) 90 mcg/actuation inhaler Inhale 2 puffs into the lungs every 4 to 6 hours as needed for Wheezing or Shortness of Breath. 8.5 g 2    butalbital-acetaminophen-caffeine -40 mg (FIORICET, ESGIC) -40 mg per tablet Take 1 tablet by mouth every 4 (four) hours as needed for Pain. 60 tablet 2    cyclobenzaprine (FLEXERIL) 10 MG tablet Take 1 tablet (10 mg total) by mouth every evening. 20 tablet 0    EPINEPHrine (EPIPEN) 0.3 mg/0.3 mL AtIn Inject 0.3 mLs (0.3 mg total) into the muscle once. for 1 dose 2 each PRN    sertraline (ZOLOFT)  25 MG tablet TAKE 1 TABLET(25 MG) BY MOUTH EVERY DAY 90 tablet 3     No current facility-administered medications on file prior to visit.       Medications have been reviewed and reconciled with patient at this visit.  Barriers to medications reviewed with patient.    Adverse reactions to current medications reviewed with patient..    Over the counter medications reviewed and reconciled with patient.    Exam:  Wt Readings from Last 3 Encounters:   07/03/24 88.7 kg (195 lb 8.8 oz)   06/10/24 87.9 kg (193 lb 12.6 oz)   04/16/24 90 kg (198 lb 6.6 oz)     Temp Readings from Last 3 Encounters:   07/03/24 98.4 °F (36.9 °C) (Tympanic)   06/10/24 98.7 °F (37.1 °C) (Tympanic)   04/16/24 97.5 °F (36.4 °C) (Tympanic)     BP Readings from Last 3 Encounters:   07/03/24 110/70   06/10/24 116/82   04/16/24 118/72     Pulse Readings from Last 3 Encounters:   07/03/24 83   06/10/24 68   04/16/24 66     Body mass index is 31.56 kg/m².      Review of Systems   Constitutional:  Negative for fever.   Respiratory:  Negative for cough, shortness of breath and wheezing.    Cardiovascular:  Negative for chest pain and palpitations.   Gastrointestinal:  Negative for nausea.   Neurological:  Negative for speech change, weakness and headaches.   All other systems reviewed and are negative.    Physical Exam  Vitals and nursing note reviewed.   Constitutional:       Appearance: Normal appearance. She is obese.   HENT:      Head: Normocephalic and atraumatic.      Right Ear: Tympanic membrane, ear canal and external ear normal.      Left Ear: Tympanic membrane, ear canal and external ear normal.      Nose: Nose normal.      Mouth/Throat:      Mouth: Mucous membranes are moist.      Pharynx: Oropharynx is clear.   Eyes:      Extraocular Movements: Extraocular movements intact.      Conjunctiva/sclera: Conjunctivae normal.      Pupils: Pupils are equal, round, and reactive to light.   Cardiovascular:      Rate and Rhythm: Normal rate and regular  rhythm.      Pulses: Normal pulses.      Heart sounds: Normal heart sounds.   Pulmonary:      Effort: Pulmonary effort is normal.      Breath sounds: Normal breath sounds.   Abdominal:      General: Bowel sounds are normal.      Palpations: Abdomen is soft.   Musculoskeletal:         General: Normal range of motion.      Cervical back: Normal range of motion and neck supple.   Skin:     General: Skin is warm and dry.      Capillary Refill: Capillary refill takes less than 2 seconds.   Neurological:      General: No focal deficit present.      Mental Status: She is alert and oriented to person, place, and time.   Psychiatric:         Mood and Affect: Mood normal.         Behavior: Behavior normal.         Thought Content: Thought content normal.         Judgment: Judgment normal.         Laboratory Reviewed ({Yes)  Lab Results   Component Value Date    WBC 4.91 06/28/2023    HGB 13.0 06/28/2023    HCT 41.9 06/28/2023     06/28/2023    CHOL 232 (H) 11/28/2018    TRIG 83 11/28/2018    HDL 64 11/28/2018    ALT 21 06/28/2023    AST 21 06/28/2023     06/28/2023    K 4.1 06/28/2023     06/28/2023    CREATININE 0.7 06/28/2023    BUN 10 06/28/2023    CO2 24 06/28/2023    TSH 0.75 01/31/2023    INR 0.97 08/10/2021    HGBA1C 5.2 01/31/2023       Marco Antonio was seen today for allergic reaction.    Diagnoses and all orders for this visit:    Allergic reaction, initial encounter        Plan   The current medical regimen is effective;  continue present plan and medications.         Care Plan/Goals: Reviewed    Goals    None     Marco Antonio was seen today for allergic reaction.    Diagnoses and all orders for this visit:    Allergic reaction, initial encounter         Follow up: Follow up in about 6 months (around 1/3/2025) for follow up 6 months with Dr. Alvarez.    After visit summary was printed and given to patient upon discharge today.  Patient goals and care plan are included in After Visit Summary.

## 2024-07-03 NOTE — LETTER
July 3, 2024      [facility]  [address] [place] [address]  Phone: [phone]  Fax: [phone]       Patient: Marco Antonio Cisse   YOB: 1985  Date of Visit: 07/03/2024    To Whom It May Concern:    Jose Alfredo Cisse  was at [facility] on 07/03/2024. The patient may return to work/school on 07/3/2024 with no restrictions. If you have any questions or concerns, or if I can be of further assistance, please do not hesitate to contact me.    Sincerely,      ELLE Spring

## 2024-07-15 ENCOUNTER — DOCUMENTATION ONLY (OUTPATIENT)
Dept: REHABILITATION | Facility: HOSPITAL | Age: 39
End: 2024-07-15
Payer: MEDICAID

## 2024-07-15 PROBLEM — M25.612 DECREASED RANGE OF MOTION OF LEFT SHOULDER: Status: RESOLVED | Noted: 2024-04-22 | Resolved: 2024-07-15

## 2024-07-15 PROBLEM — Z74.09 DECREASED FUNCTIONAL MOBILITY AND ENDURANCE: Status: RESOLVED | Noted: 2024-04-22 | Resolved: 2024-07-15

## 2024-07-15 PROBLEM — R29.898 DECREASED STRENGTH OF UPPER EXTREMITY: Status: RESOLVED | Noted: 2024-04-22 | Resolved: 2024-07-15

## 2024-07-15 NOTE — PROGRESS NOTES
[facility]  Physical Therapy Discharge Note    Name: Marco Antonio Cisse  Clinic Number: [id number]    Therapy Diagnosis: No diagnosis found.  Physician: No ref. provider found    Physician Orders: Physical Therapy Evaluation and Treat  Medical Diagnosis from Referral: upper back pain  Evaluation Date: 4/22/2024      Date of Last visit: 05/13/2024  Total Visits Received: 5    ASSESSMENT        Discharge reason: Patient has not attended therapy since 05/13/2024    Discharge FOTO Score: see FOTO    Goals: see last treatment note    PLAN   This patient is discharged from PT.    Claire Wynne, PT, DPT

## 2024-09-18 ENCOUNTER — TELEPHONE (OUTPATIENT)
Dept: FAMILY MEDICINE | Facility: CLINIC | Age: 39
End: 2024-09-18
Payer: MEDICAID

## 2024-09-18 DIAGNOSIS — Z12.31 ENCOUNTER FOR SCREENING MAMMOGRAM FOR BREAST CANCER: Primary | ICD-10-CM

## 2024-09-18 NOTE — TELEPHONE ENCOUNTER
----- Message from Yanira Kong sent at 9/18/2024  9:34 AM CDT -----  Regarding: mammo  Contact: Marco Antonio  .Type:  Mammogram    Caller is requesting to schedule their annual mammogram appointment.  Order is not listed in EPIC.  Please enter order and contact patient to schedule.  Name of Caller: Marco Antonio   Where would they like the mammogram performed[place] [facility]   Would the patient rather a call back or a response via [facility]? call  Best Call Back Number: [phone] (home)    Additional Information: Please inform when order is ready to be scheduled.

## 2024-09-18 NOTE — TELEPHONE ENCOUNTER
Spoke with pt, notified her she has a MMG ordered externally and scheduled for 01/2025. She stated she does see GYN externally and I notified her that was fine and just have them send us the results. Alex REGAN.

## 2024-10-10 ENCOUNTER — TELEPHONE (OUTPATIENT)
Dept: FAMILY MEDICINE | Facility: CLINIC | Age: 39
End: 2024-10-10
Payer: MEDICAID

## 2024-10-10 NOTE — TELEPHONE ENCOUNTER
----- Message from Theresa sent at 10/10/2024  7:59 AM CDT -----  Type:  Sooner Apoointment Request    Caller is requesting a sooner appointment.  Caller declined first available appointment listed below.  Caller will not accept being placed on the waitlist and is requesting a message be sent to doctor.  Name of Caller:pt  When is the first available appointment?schedule not open  Symptoms:headache, body ache, congestion, no fever  Would the patient rather a call back or a response via [facility]? call  Best Call Back Number:[phone]    Additional Information: requesting appt asap

## 2024-10-10 NOTE — TELEPHONE ENCOUNTER
Spoke with pt, offered appt today with Dr. Alvarez today at 10:30am but she said she could not make it at 10:30. Offered Monday appt but she declined that as well, she states she will go to urgent care instead.

## 2024-11-11 DIAGNOSIS — Z91.018 FOOD ALLERGY: ICD-10-CM

## 2024-11-11 DIAGNOSIS — Z76.0 MEDICATION REFILL: ICD-10-CM

## 2024-11-12 RX ORDER — ALBUTEROL SULFATE 90 UG/1
2 INHALANT RESPIRATORY (INHALATION)
Qty: 8.5 G | Refills: 0 | Status: SHIPPED | OUTPATIENT
Start: 2024-11-12

## 2024-11-12 RX ORDER — EPINEPHRINE 0.3 MG/.3ML
1 INJECTION SUBCUTANEOUS ONCE
Qty: 1 EACH | Refills: 0 | Status: SHIPPED | OUTPATIENT
Start: 2024-11-12 | End: 2024-11-12

## 2024-11-12 NOTE — TELEPHONE ENCOUNTER
Care Due:                  Date            Visit Type   Department     Provider  --------------------------------------------------------------------------------    Last Visit: None Found      None         None Found  Next Visit: None Scheduled  None         None Found                                                            Last  Test          Frequency    Reason                     Performed    Due Date  --------------------------------------------------------------------------------    Office Visit  15 months..  albuterol................  Not Found    Overdue    [employer] Embedded Care Due Messages. Reference number: [id number].   11/11/2024 9:19:57 PM CST

## 2024-12-05 ENCOUNTER — TELEPHONE (OUTPATIENT)
Dept: FAMILY MEDICINE | Facility: CLINIC | Age: 39
End: 2024-12-05

## 2024-12-05 RX ORDER — SCOLOPAMINE TRANSDERMAL SYSTEM 1 MG/1
1 PATCH, EXTENDED RELEASE TRANSDERMAL
Qty: 4 PATCH | Refills: 0 | Status: SHIPPED | OUTPATIENT
Start: 2024-12-05

## 2024-12-05 NOTE — TELEPHONE ENCOUNTER
Pt states she is going on a cruise and is requesting the motion sickness patch that goes behind her ear, please advise.

## 2024-12-05 NOTE — TELEPHONE ENCOUNTER
----- Message from Myah sent at 12/5/2024  8:18 AM CST -----  Type:  Needs Medical Advice    Who Called: DERREK CASTRO [[id number]]  Symptoms (please be specific):    How long has patient had these symptoms:    Pharmacy name and phone #:    Would the patient rather a call back or a response via [facility]?   Best Call Back Number:  [phone]  Additional Information: Patient called and would like a prescription for Motion Sickness

## 2024-12-16 ENCOUNTER — LAB VISIT (OUTPATIENT)
Dept: LAB | Facility: HOSPITAL | Age: 39
End: 2024-12-16
Attending: INTERNAL MEDICINE
Payer: MEDICAID

## 2024-12-16 ENCOUNTER — OFFICE VISIT (OUTPATIENT)
Dept: FAMILY MEDICINE | Facility: CLINIC | Age: 39
End: 2024-12-16
Payer: MEDICAID

## 2024-12-16 VITALS
TEMPERATURE: 97 F | SYSTOLIC BLOOD PRESSURE: 124 MMHG | WEIGHT: 178.13 LBS | HEIGHT: 66 IN | DIASTOLIC BLOOD PRESSURE: 80 MMHG | HEART RATE: 64 BPM | OXYGEN SATURATION: 98 % | BODY MASS INDEX: 28.63 KG/M2

## 2024-12-16 DIAGNOSIS — E88.819 INSULIN RESISTANCE: ICD-10-CM

## 2024-12-16 DIAGNOSIS — E78.5 DYSLIPIDEMIA: ICD-10-CM

## 2024-12-16 DIAGNOSIS — R60.9 FLUID RETENTION: ICD-10-CM

## 2024-12-16 DIAGNOSIS — E78.5 DYSLIPIDEMIA: Primary | ICD-10-CM

## 2024-12-16 LAB
ALBUMIN SERPL BCP-MCNC: 3.9 G/DL (ref 3.5–5.2)
ALP SERPL-CCNC: 41 U/L (ref 40–150)
ALT SERPL W/O P-5'-P-CCNC: 17 U/L (ref 10–44)
ANION GAP SERPL CALC-SCNC: 10 MMOL/L (ref 8–16)
AST SERPL-CCNC: 18 U/L (ref 10–40)
BASOPHILS # BLD AUTO: 0.04 K/UL (ref 0–0.2)
BASOPHILS NFR BLD: 0.8 % (ref 0–1.9)
BILIRUB SERPL-MCNC: 0.6 MG/DL (ref 0.1–1)
BUN SERPL-MCNC: 11 MG/DL (ref 6–20)
CALCIUM SERPL-MCNC: 9.5 MG/DL (ref 8.7–10.5)
CHLORIDE SERPL-SCNC: 105 MMOL/L (ref 95–110)
CHOLEST SERPL-MCNC: 205 MG/DL (ref 120–199)
CHOLEST/HDLC SERPL: 2.5 {RATIO} (ref 2–5)
CO2 SERPL-SCNC: 24 MMOL/L (ref 23–29)
CREAT SERPL-MCNC: 0.7 MG/DL (ref 0.5–1.4)
DIFFERENTIAL METHOD BLD: ABNORMAL
EOSINOPHIL # BLD AUTO: 0.1 K/UL (ref 0–0.5)
EOSINOPHIL NFR BLD: 1.3 % (ref 0–8)
ERYTHROCYTE [DISTWIDTH] IN BLOOD BY AUTOMATED COUNT: 15.5 % (ref 11.5–14.5)
EST. GFR  (NO RACE VARIABLE): >60 ML/MIN/1.73 M^2
ESTIMATED AVG GLUCOSE: 105 MG/DL (ref 68–131)
GLUCOSE SERPL-MCNC: 61 MG/DL (ref 70–110)
HBA1C MFR BLD: 5.3 % (ref 4–5.6)
HCT VFR BLD AUTO: 39.4 % (ref 37–48.5)
HDLC SERPL-MCNC: 81 MG/DL (ref 40–75)
HDLC SERPL: 39.5 % (ref 20–50)
HGB BLD-MCNC: 12.6 G/DL (ref 12–16)
IMM GRANULOCYTES # BLD AUTO: 0.01 K/UL (ref 0–0.04)
IMM GRANULOCYTES NFR BLD AUTO: 0.2 % (ref 0–0.5)
LDLC SERPL CALC-MCNC: 116.2 MG/DL (ref 63–159)
LYMPHOCYTES # BLD AUTO: 1.9 K/UL (ref 1–4.8)
LYMPHOCYTES NFR BLD: 35.3 % (ref 18–48)
MCH RBC QN AUTO: 26.8 PG (ref 27–31)
MCHC RBC AUTO-ENTMCNC: 32 G/DL (ref 32–36)
MCV RBC AUTO: 84 FL (ref 82–98)
MONOCYTES # BLD AUTO: 0.5 K/UL (ref 0.3–1)
MONOCYTES NFR BLD: 8.6 % (ref 4–15)
NEUTROPHILS # BLD AUTO: 2.9 K/UL (ref 1.8–7.7)
NEUTROPHILS NFR BLD: 53.8 % (ref 38–73)
NONHDLC SERPL-MCNC: 124 MG/DL
NRBC BLD-RTO: 0 /100 WBC
PLATELET # BLD AUTO: 220 K/UL (ref 150–450)
PMV BLD AUTO: 11.8 FL (ref 9.2–12.9)
POTASSIUM SERPL-SCNC: 4.1 MMOL/L (ref 3.5–5.1)
PROT SERPL-MCNC: 7.7 G/DL (ref 6–8.4)
RBC # BLD AUTO: 4.7 M/UL (ref 4–5.4)
SODIUM SERPL-SCNC: 139 MMOL/L (ref 136–145)
TRIGL SERPL-MCNC: 39 MG/DL (ref 30–150)
WBC # BLD AUTO: 5.32 K/UL (ref 3.9–12.7)

## 2024-12-16 PROCEDURE — 3079F DIAST BP 80-89 MM HG: CPT | Mod: CPTII,,, | Performed by: INTERNAL MEDICINE

## 2024-12-16 PROCEDURE — 83036 HEMOGLOBIN GLYCOSYLATED A1C: CPT | Performed by: INTERNAL MEDICINE

## 2024-12-16 PROCEDURE — 99213 OFFICE O/P EST LOW 20 MIN: CPT | Mod: PBBFAC,PO | Performed by: INTERNAL MEDICINE

## 2024-12-16 PROCEDURE — 80053 COMPREHEN METABOLIC PANEL: CPT | Performed by: INTERNAL MEDICINE

## 2024-12-16 PROCEDURE — 3074F SYST BP LT 130 MM HG: CPT | Mod: CPTII,,, | Performed by: INTERNAL MEDICINE

## 2024-12-16 PROCEDURE — 84443 ASSAY THYROID STIM HORMONE: CPT | Performed by: INTERNAL MEDICINE

## 2024-12-16 PROCEDURE — 3008F BODY MASS INDEX DOCD: CPT | Mod: CPTII,,, | Performed by: INTERNAL MEDICINE

## 2024-12-16 PROCEDURE — 84439 ASSAY OF FREE THYROXINE: CPT | Performed by: INTERNAL MEDICINE

## 2024-12-16 PROCEDURE — 36415 COLL VENOUS BLD VENIPUNCTURE: CPT | Mod: PO | Performed by: INTERNAL MEDICINE

## 2024-12-16 PROCEDURE — 99214 OFFICE O/P EST MOD 30 MIN: CPT | Mod: S$PBB,,, | Performed by: INTERNAL MEDICINE

## 2024-12-16 PROCEDURE — 1159F MED LIST DOCD IN RCRD: CPT | Mod: CPTII,,, | Performed by: INTERNAL MEDICINE

## 2024-12-16 PROCEDURE — 99999 PR PBB SHADOW E&M-EST. PATIENT-LVL III: CPT | Mod: PBBFAC,,, | Performed by: INTERNAL MEDICINE

## 2024-12-16 PROCEDURE — 85025 COMPLETE CBC W/AUTO DIFF WBC: CPT | Performed by: INTERNAL MEDICINE

## 2024-12-16 PROCEDURE — 80061 LIPID PANEL: CPT | Performed by: INTERNAL MEDICINE

## 2024-12-16 NOTE — PROGRESS NOTES
Subjective:       Patient ID: Marco Antonio Cisse is a 39 y.o. female.    Chief Complaint: Follow-up, Hyperlipidemia, and Insulin Resistance    Follow-up  Pertinent negatives include no abdominal pain, arthralgias, chest pain, chills, congestion, coughing, diaphoresis, fatigue, fever, headaches, joint swelling, myalgias, nausea, neck pain, numbness, rash, sore throat, vomiting or weakness.   Hyperlipidemia  Pertinent negatives include no chest pain, myalgias or shortness of breath.     Past Medical History:   Diagnosis Date    Breast pain     Family history of malignant neoplasm of breast 02/14/2024    Fibroadenoma of right breast     Hand swelling     History of swelling of feet     IBS (irritable bowel syndrome)     Insulin resistance syndrome     Migraines     Vitamin D deficiency disease      Past Surgical History:   Procedure Laterality Date    COLONOSCOPY N/A 10/07/2015    Procedure: COLONOSCOPY;  Surgeon: Kris Barker MD;  Location: [facility];  Service: Endoscopy;  Laterality: N/A;    ENDOMETRIAL ABLATION  03/05/2014    [facility]    KELOID EXCISION      LAPAROSCOPIC SLEEVE GASTRECTOMY  08/25/2021    Right breast sonocore biopsy 2/17/16      SHOULDER ARTHROSCOPY      TUBAL LIGATION Bilateral 03/05/2014     Family History   Problem Relation Name Age of Onset    Hypertension Mother      Arthritis Mother      Diabetes Father      Hypertension Father      Stroke Father      Colon polyps Father      Arthritis Maternal Aunt      Breast cancer Paternal Aunt  60 - 69    Lupus Cousin      Cancer Neg Hx      Ovarian cancer Neg Hx       Social History     Socioeconomic History    Marital status:     Number of children: 2   Occupational History    Occupation:      Employer: [employer]   Tobacco Use    Smoking status: Never    Smokeless tobacco: Never   Substance and Sexual Activity    Alcohol use: No     Alcohol/week: 0.0 standard drinks of alcohol    Drug use: No    Sexual  activity: Yes     Birth control/protection: Surgical     Social Drivers of Health     Stress: No Stress Concern Present (9/10/2019)    [employer] - Occupational Stress Questionnaire     Feeling of Stress : Not at all     Review of Systems   Constitutional:  Negative for activity change, appetite change, chills, diaphoresis, fatigue, fever and unexpected weight change.   HENT:  Negative for congestion, drooling, ear discharge, ear pain, facial swelling, hearing loss, mouth sores, nosebleeds, postnasal drip, rhinorrhea, sinus pressure, sneezing, sore throat, tinnitus, trouble swallowing and voice change.    Eyes:  Negative for photophobia, redness and visual disturbance.   Respiratory:  Negative for apnea, cough, choking, chest tightness, shortness of breath and wheezing.    Cardiovascular:  Negative for chest pain, palpitations and leg swelling.   Gastrointestinal:  Negative for abdominal distention, abdominal pain, blood in stool, constipation, diarrhea, nausea and vomiting.   Endocrine: Negative for cold intolerance, heat intolerance, polydipsia, polyphagia and polyuria.   Genitourinary:  Negative for decreased urine volume, difficulty urinating, dysuria, flank pain, frequency, genital sores, hematuria, pelvic pain and urgency.   Musculoskeletal:  Negative for arthralgias, back pain, gait problem, joint swelling, myalgias, neck pain and neck stiffness.   Skin:  Negative for color change, pallor, rash and wound.   Allergic/Immunologic: Negative for food allergies and immunocompromised state.   Neurological:  Negative for dizziness, tremors, seizures, syncope, speech difficulty, weakness, light-headedness, numbness and headaches.   Hematological:  Negative for adenopathy. Does not bruise/bleed easily.   Psychiatric/Behavioral:  Negative for agitation, behavioral problems, confusion, decreased concentration, dysphoric mood, hallucinations, self-injury, sleep disturbance and suicidal  ideas. The patient is not nervous/anxious and is not hyperactive.    All other systems reviewed and are negative.      Objective:      Physical Exam  Vitals and nursing note reviewed.   Constitutional:       General: She is not in acute distress.     Appearance: Normal appearance. She is well-developed. She is not diaphoretic.   HENT:      Head: Normocephalic and atraumatic.      Mouth/Throat:      Pharynx: No oropharyngeal exudate.   Eyes:      General: No scleral icterus.  Neck:      Thyroid: No thyromegaly.      Vascular: No carotid bruit or JVD.      Trachea: No tracheal deviation.   Cardiovascular:      Rate and Rhythm: Normal rate and regular rhythm.      Heart sounds: Normal heart sounds.   Pulmonary:      Effort: Pulmonary effort is normal. No respiratory distress.      Breath sounds: Normal breath sounds. No wheezing or rales.   Chest:      Chest wall: No tenderness.   Abdominal:      General: Bowel sounds are normal. There is no distension.      Palpations: Abdomen is soft.      Tenderness: There is no abdominal tenderness. There is no guarding or rebound.   Musculoskeletal:         General: No tenderness. Normal range of motion.      Cervical back: Normal range of motion and neck supple.   Lymphadenopathy:      Cervical: No cervical adenopathy.   Skin:     General: Skin is warm and dry.      Coloration: Skin is not pale.      Findings: No erythema or rash.   Neurological:      Mental Status: She is alert and oriented to person, place, and time.      Cranial Nerves: No cranial nerve deficit.      Coordination: Coordination normal.   Psychiatric:         Behavior: Behavior normal.         Thought Content: Thought content normal.         Judgment: Judgment normal.         CMP  Sodium   Date Value Ref Range Status   06/28/2023 140 136 - 145 mmol/L Final     Potassium   Date Value Ref Range Status   06/28/2023 4.1 3.5 - 5.1 mmol/L Final     Chloride   Date Value Ref Range Status   06/28/2023 105 95 - 110  mmol/L Final     CO2   Date Value Ref Range Status   06/28/2023 24 23 - 29 mmol/L Final     Glucose   Date Value Ref Range Status   06/28/2023 75 70 - 110 mg/dL Final     BUN   Date Value Ref Range Status   06/28/2023 10 6 - 20 mg/dL Final     Creatinine   Date Value Ref Range Status   06/28/2023 0.7 0.5 - 1.4 mg/dL Final     Calcium   Date Value Ref Range Status   06/28/2023 9.6 8.7 - 10.5 mg/dL Final     Total Protein   Date Value Ref Range Status   06/28/2023 8.1 6.0 - 8.4 g/dL Final     Albumin   Date Value Ref Range Status   06/28/2023 4.2 3.5 - 5.2 g/dL Final     Total Bilirubin   Date Value Ref Range Status   06/28/2023 0.6 0.1 - 1.0 mg/dL Final     Comment:     For infants and newborns, interpretation of results should be based  on gestational age, weight and in agreement with clinical  observations.    Premature Infant recommended reference ranges:  Up to 24 hours.............<8.0 mg/dL  Up to 48 hours............<12.0 mg/dL  3-5 days..................<15.0 mg/dL  6-29 days.................<15.0 mg/dL       Alkaline Phosphatase   Date Value Ref Range Status   06/28/2023 49 (L) 55 - 135 U/L Final     AST   Date Value Ref Range Status   06/28/2023 21 10 - 40 U/L Final     ALT   Date Value Ref Range Status   06/28/2023 21 10 - 44 U/L Final     Anion Gap   Date Value Ref Range Status   06/28/2023 11 8 - 16 mmol/L Final     eGFR if    Date Value Ref Range Status   11/28/2018 >60.0 >60 mL/min/1.73 m^2 Final     eGFR if non    Date Value Ref Range Status   11/28/2018 >60.0 >60 mL/min/1.73 m^2 Final     Comment:     Calculation used to obtain the estimated glomerular filtration  rate (eGFR) is the CKD-EPI equation.        Lab Results   Component Value Date    WBC 4.91 06/28/2023    HGB 13.0 06/28/2023    HCT 41.9 06/28/2023    MCV 86 06/28/2023     06/28/2023     Lab Results   Component Value Date    CHOL 232 (H) 11/28/2018     Lab Results   Component Value Date    HDL 64  11/28/2018     Lab Results   Component Value Date    LDLCALC 151.4 11/28/2018     Lab Results   Component Value Date    TRIG 83 11/28/2018     Lab Results   Component Value Date    CHOLHDL 27.6 11/28/2018     Lab Results   Component Value Date    TSH 0.75 01/31/2023     Lab Results   Component Value Date    HGBA1C 5.2 01/31/2023     Assessment:       1. Dyslipidemia    2. Fluid retention    3. Insulin resistance        Plan:   Dyslipidemia  -     Comprehensive Metabolic Panel; Future; Expected date: 12/16/2024  -     CBC Auto Differential; Future; Expected date: 12/16/2024  -     Lipid Panel; Future; Expected date: 12/16/2024  -     TSH; Future; Expected date: 12/16/2024    Fluid retention    Insulin resistance  -     Hemoglobin A1C; Future; Expected date: 12/16/2024    Stable-------------------watch diet,exercise------------------sees ob/gyn for pap,mmg.  Colon at gi [facility]----------------------f/u prn or 1 year---------------

## 2024-12-17 ENCOUNTER — PATIENT MESSAGE (OUTPATIENT)
Dept: FAMILY MEDICINE | Facility: CLINIC | Age: 39
End: 2024-12-17
Payer: MEDICAID

## 2024-12-17 LAB
T4 FREE SERPL-MCNC: 1.06 NG/DL (ref 0.71–1.51)
TSH SERPL DL<=0.005 MIU/L-ACNC: 0.43 UIU/ML (ref 0.4–4)

## 2025-01-14 ENCOUNTER — PATIENT MESSAGE (OUTPATIENT)
Dept: SURGERY | Facility: CLINIC | Age: 40
End: 2025-01-14
Payer: MEDICAID

## 2025-02-04 ENCOUNTER — OFFICE VISIT (OUTPATIENT)
Dept: FAMILY MEDICINE | Facility: CLINIC | Age: 40
End: 2025-02-04
Payer: MEDICAID

## 2025-02-04 VITALS
DIASTOLIC BLOOD PRESSURE: 84 MMHG | SYSTOLIC BLOOD PRESSURE: 120 MMHG | TEMPERATURE: 96 F | OXYGEN SATURATION: 99 % | BODY MASS INDEX: 28.83 KG/M2 | HEIGHT: 66 IN | HEART RATE: 74 BPM | WEIGHT: 179.38 LBS

## 2025-02-04 DIAGNOSIS — M89.8X1 PAIN OF LEFT SCAPULA: Primary | ICD-10-CM

## 2025-02-04 DIAGNOSIS — Z12.31 OTHER SCREENING MAMMOGRAM: Primary | ICD-10-CM

## 2025-02-04 PROCEDURE — 99213 OFFICE O/P EST LOW 20 MIN: CPT | Mod: PBBFAC,PO | Performed by: INTERNAL MEDICINE

## 2025-02-04 PROCEDURE — 3074F SYST BP LT 130 MM HG: CPT | Mod: CPTII,,, | Performed by: INTERNAL MEDICINE

## 2025-02-04 PROCEDURE — 3079F DIAST BP 80-89 MM HG: CPT | Mod: CPTII,,, | Performed by: INTERNAL MEDICINE

## 2025-02-04 PROCEDURE — 99999 PR PBB SHADOW E&M-EST. PATIENT-LVL III: CPT | Mod: PBBFAC,,, | Performed by: INTERNAL MEDICINE

## 2025-02-04 PROCEDURE — 3008F BODY MASS INDEX DOCD: CPT | Mod: CPTII,,, | Performed by: INTERNAL MEDICINE

## 2025-02-04 PROCEDURE — 99213 OFFICE O/P EST LOW 20 MIN: CPT | Mod: S$PBB,,, | Performed by: INTERNAL MEDICINE

## 2025-02-04 RX ORDER — MELOXICAM 7.5 MG/1
7.5 TABLET ORAL DAILY PRN
Qty: 20 TABLET | Refills: 0 | Status: SHIPPED | OUTPATIENT
Start: 2025-02-04

## 2025-02-04 RX ORDER — BACLOFEN 10 MG/1
10 TABLET ORAL NIGHTLY PRN
Qty: 20 TABLET | Refills: 0 | Status: SHIPPED | OUTPATIENT
Start: 2025-02-04 | End: 2026-02-04

## 2025-02-11 ENCOUNTER — OFFICE VISIT (OUTPATIENT)
Dept: SURGERY | Facility: CLINIC | Age: 40
End: 2025-02-11
Payer: MEDICAID

## 2025-02-11 DIAGNOSIS — D24.1 FIBROADENOMA OF RIGHT BREAST: Primary | ICD-10-CM

## 2025-02-11 DIAGNOSIS — N63.15 BREAST LUMP ON RIGHT SIDE AT 12 O'CLOCK POSITION: ICD-10-CM

## 2025-02-11 DIAGNOSIS — Z80.3 FAMILY HISTORY OF MALIGNANT NEOPLASM OF BREAST: ICD-10-CM

## 2025-02-11 PROCEDURE — 99213 OFFICE O/P EST LOW 20 MIN: CPT | Mod: S$PBB,,, | Performed by: NURSE PRACTITIONER

## 2025-02-11 PROCEDURE — 1159F MED LIST DOCD IN RCRD: CPT | Mod: CPTII,,, | Performed by: NURSE PRACTITIONER

## 2025-02-11 PROCEDURE — 99212 OFFICE O/P EST SF 10 MIN: CPT | Mod: PBBFAC | Performed by: NURSE PRACTITIONER

## 2025-02-11 PROCEDURE — 99999 PR PBB SHADOW E&M-EST. PATIENT-LVL II: CPT | Mod: PBBFAC,,, | Performed by: NURSE PRACTITIONER

## 2025-02-11 PROCEDURE — 1160F RVW MEDS BY RX/DR IN RCRD: CPT | Mod: CPTII,,, | Performed by: NURSE PRACTITIONER

## 2025-02-11 NOTE — PROGRESS NOTES
"  [facility]    VANDANA Pendleton      Date of Service: 2/11/2025      Chief Complaint:   Marco Antonio Cisse is a 39 y.o. female presenting today for her annual evaluation.  She is due for a mammogram. She reports no interval changes.     History of Present Illness:   Mrs. Melendez first presented February 15, 2016 with "lumpiness" and tenderness. Imaging showed a right breast nodule that was found to be a benign fibroadenoma at sonocore biopsy. MD:::Severo Brewer MD.     Past Medical History:   Diagnosis Date    Breast pain     Family history of malignant neoplasm of breast 02/14/2024    Fibroadenoma of right breast     Hand swelling     History of swelling of feet     IBS (irritable bowel syndrome)     Insulin resistance syndrome     Migraines     Vitamin D deficiency disease       Past Surgical History:   Procedure Laterality Date    COLONOSCOPY N/A 10/07/2015    Procedure: COLONOSCOPY;  Surgeon: Kris Barker MD;  Location: [facility];  Service: Endoscopy;  Laterality: N/A;    ENDOMETRIAL ABLATION  03/05/2014    [facility]    KELOID EXCISION      LAPAROSCOPIC SLEEVE GASTRECTOMY  08/25/2021    Right breast sonocore biopsy 2/17/16      SHOULDER ARTHROSCOPY      TUBAL LIGATION Bilateral 03/05/2014        Current Outpatient Medications:     albuterol (PROVENTIL/VENTOLIN HFA) 90 mcg/actuation inhaler, Inhale 2 puffs into the lungs every 4 to 6 hours as needed for Wheezing or Shortness of Breath., Disp: 8.5 g, Rfl: 0    baclofen (LIORESAL) 10 MG tablet, Take 1 tablet (10 mg total) by mouth nightly as needed., Disp: 20 tablet, Rfl: 0    cyclobenzaprine (FLEXERIL) 10 MG tablet, Take 1 tablet (10 mg total) by mouth every evening., Disp: 20 tablet, Rfl: 0    EPINEPHrine (EPIPEN) 0.3 mg/0.3 mL AtIn, Inject 0.3 mLs (0.3 mg total) into the muscle once. for 1 dose, Disp: 1 each, Rfl: 0    estradioL (ESTRACE) 2 MG tablet, Take 1 tablet (2 mg total) by mouth once daily., " Disp: 30 tablet, Rfl: 2    medroxyPROGESTERone (PROVERA) 5 MG tablet, Take 1 tablet (5 mg total) by mouth once daily., Disp: 30 tablet, Rfl: 2    meloxicam (MOBIC) 7.5 MG tablet, Take 1 tablet (7.5 mg total) by mouth daily as needed for Pain., Disp: 20 tablet, Rfl: 0    scopolamine (TRANSDERM-SCOP) 1.3-1.5 mg (1 mg over 3 days), Place 1 patch onto the skin every 72 hours., Disp: 4 patch, Rfl: 0    sertraline (ZOLOFT) 25 MG tablet, TAKE 1 TABLET(25 MG) BY MOUTH EVERY DAY, Disp: 90 tablet, Rfl: 3   Review of patient's allergies indicates:   Allergen Reactions    Shellfish containing products Shortness Of Breath    Bactrim ds [sulfamethoxazole-trimethoprim] Nausea And Vomiting      Social History     Tobacco Use    Smoking status: Never    Smokeless tobacco: Never   Substance Use Topics    Alcohol use: No     Alcohol/week: 0.0 standard drinks of alcohol      Family History   Problem Relation Name Age of Onset    Hypertension Mother      Arthritis Mother      Diabetes Father      Hypertension Father      Stroke Father      Colon polyps Father      Arthritis Maternal Aunt      Breast cancer Paternal Aunt  60 - 69    Lupus Cousin      Cancer Neg Hx      Ovarian cancer Neg Hx          Review of Systems   Integumentary:  Negative for color change, rash, mole/lesion, breast mass, breast discharge and breast tenderness.   Breast: Negative for mass and tenderness       Physical Exam   HENT:   Head: Normocephalic.   Pulmonary/Chest: Right breast exhibits no inverted nipple, no mass, no nipple discharge, no skin change and no tenderness. Left breast exhibits no inverted nipple, no mass, no nipple discharge, no skin change and no tenderness. No breast swelling.   Genitourinary: No breast swelling.   Musculoskeletal: Lymphadenopathy:      Upper Body:      Right upper body: No supraclavicular or axillary adenopathy.      Left upper body: No supraclavicular or axillary adenopathy.     Neurological: She is alert.        MAMMOGRAM  REPORT: 1/13/2025-  There are scattered areas of fibroglandular density. There is no evidence of suspicious masses, calcifications, or other abnormal findings. There is no mammographic evidence of malignancy.       ASSESSMENT and PLAN OF CARE     1. Fibroadenoma of right breast  Assessment & Plan:  We reviewed our findings today and her questions were answered.  She understands that her imaging and exams have remained stable (and show nothing concerning).  She is comfortable being followed in a conservative fashion.      She understands the importance of monthly self-breast examination and knows to report any and all changes as they occur.    NOTE:::We viewed her films together at today's visit.  We discussed the multiple views obtained and the important findings.  Even benign changes were mentioned and her questions were answered.  She is to contact us if she has questions.          2. Breast lump on right side at 12 o'clock position  Assessment & Plan:  Same as above      3. Family history of malignant neoplasm of breast  Assessment & Plan:  We discussed her family history and how it could impact her own future risks.  We discussed family vs. genetic history and the importance and implications of each. All questions answered to her satisfaction.  She knows that as additional family members are diagnosed - she will need to let us know as this may change follow up and imaging recommendations.    We had a discussion concerning Breast Cancer Risk Reduction and current NCCN Guidelines. She knows that her risk can be lowered slightly with a healthy lifestyle and minimal ETOH use. Being physically active will also help. She should reduce or stay away from OCPs and HRT as possible.         Medical Decision Making: It is my impression that this patient suffers all conditions contained in this medical document.  Each of these conditions did affect our plan of care and my medical decision making today.  It is my opinion that  the medical decision making concerning this patient was of minimal  difficulty based on the aforementioned conditions.  Any further recommendations will be communicated to the patient by me.  I have reviewed and verified her allergies, list of medications, medical and surgical histories, social history, and a pertinent review of symptoms.     Follow up: 1 year and PRN    For: Physical Examination and MGM (S) at [facility]

## 2025-02-11 NOTE — ASSESSMENT & PLAN NOTE
We reviewed our findings today and her questions were answered.  She understands that her imaging and exams have remained stable (and show nothing concerning).  She is comfortable being followed in a conservative fashion.      She understands the importance of monthly self-breast examination and knows to report any and all changes as they occur.    NOTE:::We viewed her films together at today's visit.  We discussed the multiple views obtained and the important findings.  Even benign changes were mentioned and her questions were answered.  She is to contact us if she has questions.

## 2025-02-12 ENCOUNTER — PATIENT MESSAGE (OUTPATIENT)
Dept: FAMILY MEDICINE | Facility: CLINIC | Age: 40
End: 2025-02-12
Payer: MEDICAID

## 2025-03-25 ENCOUNTER — PATIENT MESSAGE (OUTPATIENT)
Dept: FAMILY MEDICINE | Facility: CLINIC | Age: 40
End: 2025-03-25
Payer: MEDICAID

## 2025-03-26 RX ORDER — TRAZODONE HYDROCHLORIDE 50 MG/1
25 TABLET ORAL NIGHTLY PRN
Qty: 15 TABLET | Refills: 3 | Status: SHIPPED | OUTPATIENT
Start: 2025-03-26 | End: 2025-07-24

## 2025-06-03 ENCOUNTER — TELEPHONE (OUTPATIENT)
Dept: FAMILY MEDICINE | Facility: CLINIC | Age: 40
End: 2025-06-03
Payer: MEDICAID

## 2025-06-04 ENCOUNTER — OFFICE VISIT (OUTPATIENT)
Dept: FAMILY MEDICINE | Facility: CLINIC | Age: 40
End: 2025-06-04
Payer: MEDICAID

## 2025-06-04 VITALS
BODY MASS INDEX: 27.62 KG/M2 | SYSTOLIC BLOOD PRESSURE: 124 MMHG | TEMPERATURE: 97 F | WEIGHT: 171.88 LBS | HEIGHT: 66 IN | HEART RATE: 74 BPM | OXYGEN SATURATION: 95 % | DIASTOLIC BLOOD PRESSURE: 74 MMHG

## 2025-06-04 DIAGNOSIS — R22.1 NECK NODULE: Primary | ICD-10-CM

## 2025-06-04 PROCEDURE — 99214 OFFICE O/P EST MOD 30 MIN: CPT | Mod: PBBFAC,PO | Performed by: INTERNAL MEDICINE

## 2025-06-04 PROCEDURE — 3078F DIAST BP <80 MM HG: CPT | Mod: CPTII,,, | Performed by: INTERNAL MEDICINE

## 2025-06-04 PROCEDURE — 1159F MED LIST DOCD IN RCRD: CPT | Mod: CPTII,,, | Performed by: INTERNAL MEDICINE

## 2025-06-04 PROCEDURE — 99214 OFFICE O/P EST MOD 30 MIN: CPT | Mod: S$PBB,,, | Performed by: INTERNAL MEDICINE

## 2025-06-04 PROCEDURE — 3074F SYST BP LT 130 MM HG: CPT | Mod: CPTII,,, | Performed by: INTERNAL MEDICINE

## 2025-06-04 PROCEDURE — 99999 PR PBB SHADOW E&M-EST. PATIENT-LVL IV: CPT | Mod: PBBFAC,,, | Performed by: INTERNAL MEDICINE

## 2025-06-04 PROCEDURE — 3008F BODY MASS INDEX DOCD: CPT | Mod: CPTII,,, | Performed by: INTERNAL MEDICINE

## 2025-06-04 RX ORDER — AMOXICILLIN AND CLAVULANATE POTASSIUM 875; 125 MG/1; MG/1
1 TABLET, FILM COATED ORAL 2 TIMES DAILY
Qty: 20 TABLET | Refills: 0 | Status: SHIPPED | OUTPATIENT
Start: 2025-06-04 | End: 2025-06-14

## 2025-06-04 RX ORDER — MELOXICAM 7.5 MG/1
7.5 TABLET ORAL DAILY PRN
Qty: 20 TABLET | Refills: 0 | Status: SHIPPED | OUTPATIENT
Start: 2025-06-04

## 2025-06-16 ENCOUNTER — LAB VISIT (OUTPATIENT)
Dept: LAB | Facility: HOSPITAL | Age: 40
End: 2025-06-16
Attending: INTERNAL MEDICINE
Payer: MEDICAID

## 2025-06-16 ENCOUNTER — OFFICE VISIT (OUTPATIENT)
Dept: FAMILY MEDICINE | Facility: CLINIC | Age: 40
End: 2025-06-16
Payer: MEDICAID

## 2025-06-16 VITALS
HEIGHT: 66 IN | SYSTOLIC BLOOD PRESSURE: 118 MMHG | BODY MASS INDEX: 28.54 KG/M2 | TEMPERATURE: 97 F | DIASTOLIC BLOOD PRESSURE: 80 MMHG | HEART RATE: 88 BPM | OXYGEN SATURATION: 97 % | WEIGHT: 177.56 LBS

## 2025-06-16 DIAGNOSIS — R22.1 NECK NODULE: ICD-10-CM

## 2025-06-16 DIAGNOSIS — R22.1 NECK NODULE: Primary | ICD-10-CM

## 2025-06-16 LAB
ABSOLUTE EOSINOPHIL (OHS): 0.07 K/UL
ABSOLUTE MONOCYTE (OHS): 0.74 K/UL (ref 0.3–1)
ABSOLUTE NEUTROPHIL COUNT (OHS): 4.46 K/UL (ref 1.8–7.7)
ALBUMIN SERPL BCP-MCNC: 4 G/DL (ref 3.5–5.2)
ALP SERPL-CCNC: 48 UNIT/L (ref 40–150)
ALT SERPL W/O P-5'-P-CCNC: 16 UNIT/L (ref 10–44)
ANION GAP (OHS): 8 MMOL/L (ref 8–16)
AST SERPL-CCNC: 17 UNIT/L (ref 11–45)
BASOPHILS # BLD AUTO: 0.04 K/UL
BASOPHILS NFR BLD AUTO: 0.6 %
BILIRUB SERPL-MCNC: 0.5 MG/DL (ref 0.1–1)
BUN SERPL-MCNC: 9 MG/DL (ref 6–20)
CALCIUM SERPL-MCNC: 9 MG/DL (ref 8.7–10.5)
CHLORIDE SERPL-SCNC: 107 MMOL/L (ref 95–110)
CO2 SERPL-SCNC: 25 MMOL/L (ref 23–29)
CREAT SERPL-MCNC: 0.6 MG/DL (ref 0.5–1.4)
ERYTHROCYTE [DISTWIDTH] IN BLOOD BY AUTOMATED COUNT: 16.3 % (ref 11.5–14.5)
GFR SERPLBLD CREATININE-BSD FMLA CKD-EPI: >60 ML/MIN/1.73/M2
GLUCOSE SERPL-MCNC: 81 MG/DL (ref 70–110)
HCT VFR BLD AUTO: 40.3 % (ref 37–48.5)
HGB BLD-MCNC: 12.7 GM/DL (ref 12–16)
IMM GRANULOCYTES # BLD AUTO: 0.01 K/UL (ref 0–0.04)
IMM GRANULOCYTES NFR BLD AUTO: 0.1 % (ref 0–0.5)
LYMPHOCYTES # BLD AUTO: 1.77 K/UL (ref 1–4.8)
MCH RBC QN AUTO: 26.7 PG (ref 27–31)
MCHC RBC AUTO-ENTMCNC: 31.5 G/DL (ref 32–36)
MCV RBC AUTO: 85 FL (ref 82–98)
NUCLEATED RBC (/100WBC) (OHS): 0 /100 WBC
PLATELET # BLD AUTO: 237 K/UL (ref 150–450)
PMV BLD AUTO: 12 FL (ref 9.2–12.9)
POTASSIUM SERPL-SCNC: 3.8 MMOL/L (ref 3.5–5.1)
PROT SERPL-MCNC: 7.5 GM/DL (ref 6–8.4)
RBC # BLD AUTO: 4.76 M/UL (ref 4–5.4)
RELATIVE EOSINOPHIL (OHS): 1 %
RELATIVE LYMPHOCYTE (OHS): 25 % (ref 18–48)
RELATIVE MONOCYTE (OHS): 10.4 % (ref 4–15)
RELATIVE NEUTROPHIL (OHS): 62.9 % (ref 38–73)
SODIUM SERPL-SCNC: 140 MMOL/L (ref 136–145)
TSH SERPL-ACNC: 0.48 UIU/ML (ref 0.4–4)
WBC # BLD AUTO: 7.09 K/UL (ref 3.9–12.7)

## 2025-06-16 PROCEDURE — 3079F DIAST BP 80-89 MM HG: CPT | Mod: CPTII,,, | Performed by: INTERNAL MEDICINE

## 2025-06-16 PROCEDURE — 85025 COMPLETE CBC W/AUTO DIFF WBC: CPT

## 2025-06-16 PROCEDURE — 3008F BODY MASS INDEX DOCD: CPT | Mod: CPTII,,, | Performed by: INTERNAL MEDICINE

## 2025-06-16 PROCEDURE — 82040 ASSAY OF SERUM ALBUMIN: CPT

## 2025-06-16 PROCEDURE — 99214 OFFICE O/P EST MOD 30 MIN: CPT | Mod: PBBFAC,PO | Performed by: INTERNAL MEDICINE

## 2025-06-16 PROCEDURE — 1159F MED LIST DOCD IN RCRD: CPT | Mod: CPTII,,, | Performed by: INTERNAL MEDICINE

## 2025-06-16 PROCEDURE — 99999 PR PBB SHADOW E&M-EST. PATIENT-LVL IV: CPT | Mod: PBBFAC,,, | Performed by: INTERNAL MEDICINE

## 2025-06-16 PROCEDURE — 84443 ASSAY THYROID STIM HORMONE: CPT

## 2025-06-16 PROCEDURE — 3074F SYST BP LT 130 MM HG: CPT | Mod: CPTII,,, | Performed by: INTERNAL MEDICINE

## 2025-06-16 PROCEDURE — 36415 COLL VENOUS BLD VENIPUNCTURE: CPT | Mod: PO

## 2025-06-16 PROCEDURE — 99213 OFFICE O/P EST LOW 20 MIN: CPT | Mod: S$PBB,,, | Performed by: INTERNAL MEDICINE

## 2025-06-16 NOTE — PROGRESS NOTES
Subjective:       Patient ID: Marco Antonio Cisse is a 39 y.o. female.    Chief Complaint: Follow-up    F/u neck nodule---much better------------less painful and smaller but still present--------no other new symptoms today    Follow-up  Pertinent negatives include no abdominal pain, arthralgias, chest pain, chills, congestion, coughing, diaphoresis, fatigue, fever, headaches, joint swelling, myalgias, nausea, neck pain, numbness, rash, sore throat, vomiting or weakness.     Past Medical History:   Diagnosis Date    Breast pain     Family history of malignant neoplasm of breast 02/14/2024    Fibroadenoma of right breast     Hand swelling     History of swelling of feet     IBS (irritable bowel syndrome)     Insulin resistance syndrome     Migraines     Vitamin D deficiency disease      Past Surgical History:   Procedure Laterality Date    COLONOSCOPY N/A 10/07/2015    Procedure: COLONOSCOPY;  Surgeon: Kris Barker MD;  Location: [facility];  Service: Endoscopy;  Laterality: N/A;    ENDOMETRIAL ABLATION  03/05/2014    [facility]    KELOID EXCISION      LAPAROSCOPIC SLEEVE GASTRECTOMY  08/25/2021    Right breast sonocore biopsy 2/17/16      SHOULDER ARTHROSCOPY      TUBAL LIGATION Bilateral 03/05/2014     Family History   Problem Relation Name Age of Onset    Hypertension Mother      Arthritis Mother      Diabetes Father      Hypertension Father      Stroke Father      Colon polyps Father      Arthritis Maternal Aunt      Breast cancer Paternal Aunt  60 - 69    Lupus Cousin      Cancer Neg Hx      Ovarian cancer Neg Hx       Social History[1]  Review of Systems   Constitutional:  Negative for activity change, appetite change, chills, diaphoresis, fatigue, fever and unexpected weight change.   HENT:  Negative for congestion, drooling, ear discharge, ear pain, facial swelling, hearing loss, mouth sores, nosebleeds, postnasal drip, rhinorrhea, sinus pressure, sneezing, sore throat, tinnitus, trouble swallowing  and voice change.    Eyes:  Negative for photophobia, redness and visual disturbance.   Respiratory:  Negative for apnea, cough, choking, chest tightness, shortness of breath and wheezing.    Cardiovascular:  Negative for chest pain, palpitations and leg swelling.   Gastrointestinal:  Negative for abdominal distention, abdominal pain, blood in stool, constipation, diarrhea, nausea, rectal pain and vomiting.   Endocrine: Negative for cold intolerance, heat intolerance, polydipsia, polyphagia and polyuria.   Genitourinary:  Negative for decreased urine volume, difficulty urinating, dysuria, flank pain, frequency, genital sores, hematuria, pelvic pain, urgency and vaginal discharge.   Musculoskeletal:  Negative for arthralgias, back pain, gait problem, joint swelling, myalgias, neck pain and neck stiffness.   Skin:  Negative for color change, pallor, rash and wound.   Allergic/Immunologic: Negative for food allergies and immunocompromised state.   Neurological:  Negative for dizziness, tremors, seizures, syncope, speech difficulty, weakness, light-headedness, numbness and headaches.   Hematological:  Negative for adenopathy. Does not bruise/bleed easily.   Psychiatric/Behavioral:  Negative for agitation, behavioral problems, confusion, decreased concentration, dysphoric mood, hallucinations, self-injury, sleep disturbance and suicidal ideas. The patient is not nervous/anxious and is not hyperactive.    All other systems reviewed and are negative.      Objective:      Physical Exam  Vitals and nursing note reviewed.   Constitutional:       General: She is not in acute distress.     Appearance: Normal appearance. She is well-developed. She is not diaphoretic.   HENT:      Head: Normocephalic and atraumatic.      Mouth/Throat:      Pharynx: No oropharyngeal exudate.   Eyes:      General: No scleral icterus.  Neck:      Thyroid: No thyromegaly.      Vascular: No carotid bruit or JVD.      Trachea: No tracheal deviation.       Comments: Nodule left upper posterior neck-----  Cardiovascular:      Rate and Rhythm: Normal rate and regular rhythm.      Heart sounds: Normal heart sounds.   Pulmonary:      Effort: Pulmonary effort is normal. No respiratory distress.      Breath sounds: Normal breath sounds. No wheezing or rales.   Chest:      Chest wall: No tenderness.   Abdominal:      General: Bowel sounds are normal. There is no distension.      Palpations: Abdomen is soft.      Tenderness: There is no abdominal tenderness. There is no guarding or rebound.   Musculoskeletal:         General: No tenderness. Normal range of motion.      Cervical back: Normal range of motion and neck supple.   Lymphadenopathy:      Cervical: No cervical adenopathy.   Skin:     General: Skin is warm and dry.      Coloration: Skin is not pale.      Findings: No erythema or rash.   Neurological:      Mental Status: She is alert and oriented to person, place, and time.      Cranial Nerves: No cranial nerve deficit.      Coordination: Coordination normal.   Psychiatric:         Behavior: Behavior normal.         Thought Content: Thought content normal.         Judgment: Judgment normal.         CMP  Sodium   Date Value Ref Range Status   12/16/2024 139 136 - 145 mmol/L Final     Potassium   Date Value Ref Range Status   12/16/2024 4.1 3.5 - 5.1 mmol/L Final     Chloride   Date Value Ref Range Status   12/16/2024 105 95 - 110 mmol/L Final     CO2   Date Value Ref Range Status   12/16/2024 24 23 - 29 mmol/L Final     Glucose   Date Value Ref Range Status   12/16/2024 61 (L) 70 - 110 mg/dL Final     BUN   Date Value Ref Range Status   12/16/2024 11 6 - 20 mg/dL Final     Creatinine   Date Value Ref Range Status   12/16/2024 0.7 0.5 - 1.4 mg/dL Final     Calcium   Date Value Ref Range Status   12/16/2024 9.5 8.7 - 10.5 mg/dL Final     Total Protein   Date Value Ref Range Status   12/16/2024 7.7 6.0 - 8.4 g/dL Final     Albumin   Date Value Ref Range Status    12/16/2024 3.9 3.5 - 5.2 g/dL Final     Total Bilirubin   Date Value Ref Range Status   12/16/2024 0.6 0.1 - 1.0 mg/dL Final     Comment:     For infants and newborns, interpretation of results should be based  on gestational age, weight and in agreement with clinical  observations.    Premature Infant recommended reference ranges:  Up to 24 hours.............<8.0 mg/dL  Up to 48 hours............<12.0 mg/dL  3-5 days..................<15.0 mg/dL  6-29 days.................<15.0 mg/dL       Alkaline Phosphatase   Date Value Ref Range Status   12/16/2024 41 40 - 150 U/L Final     AST   Date Value Ref Range Status   12/16/2024 18 10 - 40 U/L Final     ALT   Date Value Ref Range Status   12/16/2024 17 10 - 44 U/L Final     Anion Gap   Date Value Ref Range Status   12/16/2024 10 8 - 16 mmol/L Final     eGFR if    Date Value Ref Range Status   11/28/2018 >60.0 >60 mL/min/1.73 m^2 Final     eGFR if non    Date Value Ref Range Status   11/28/2018 >60.0 >60 mL/min/1.73 m^2 Final     Comment:     Calculation used to obtain the estimated glomerular filtration  rate (eGFR) is the CKD-EPI equation.        Lab Results   Component Value Date    WBC 5.32 12/16/2024    HGB 12.6 12/16/2024    HCT 39.4 12/16/2024    MCV 84 12/16/2024     12/16/2024     Lab Results   Component Value Date    CHOL 205 (H) 12/16/2024     Lab Results   Component Value Date    HDL 81 (H) 12/16/2024     Lab Results   Component Value Date    LDLCALC 116.2 12/16/2024     Lab Results   Component Value Date    TRIG 39 12/16/2024     Lab Results   Component Value Date    CHOLHDL 39.5 12/16/2024     Lab Results   Component Value Date    TSH 0.427 12/16/2024     Lab Results   Component Value Date    HGBA1C 5.3 12/16/2024     Assessment:       1. Neck nodule        Plan:   Neck nodule------------improved--------------------do f/u u/s and ent consult----------------  -     Comprehensive Metabolic Panel; Future; Expected date:  06/16/2025  -     CBC Auto Differential; Future; Expected date: 06/16/2025  -     TSH; Future; Expected date: 06/16/2025      As above-----f/u 1 month-----------------       [1]   Social History  Socioeconomic History    Marital status:     Number of children: 2   Occupational History    Occupation:      Employer: [employer]   Tobacco Use    Smoking status: Never    Smokeless tobacco: Never   Substance and Sexual Activity    Alcohol use: No     Alcohol/week: 0.0 standard drinks of alcohol    Drug use: No    Sexual activity: Yes     Birth control/protection: Surgical     Social Drivers of Health     Stress: No Stress Concern Present (9/10/2019)    [place] [facility] [employer] - Occupational Stress Questionnaire     Feeling of Stress : Not at all

## 2025-06-17 ENCOUNTER — HOSPITAL ENCOUNTER (OUTPATIENT)
Dept: RADIOLOGY | Facility: HOSPITAL | Age: 40
Discharge: HOME | End: 2025-06-17
Attending: INTERNAL MEDICINE
Payer: MEDICAID

## 2025-06-17 ENCOUNTER — RESULTS FOLLOW-UP (OUTPATIENT)
Dept: FAMILY MEDICINE | Facility: CLINIC | Age: 40
End: 2025-06-17

## 2025-06-17 DIAGNOSIS — R22.1 NECK NODULE: ICD-10-CM

## 2025-06-17 PROCEDURE — 76536 US EXAM OF HEAD AND NECK: CPT | Mod: 26,,, | Performed by: RADIOLOGY

## 2025-06-17 PROCEDURE — 76536 US EXAM OF HEAD AND NECK: CPT | Mod: TC

## 2025-06-24 ENCOUNTER — OFFICE VISIT (OUTPATIENT)
Dept: OTOLARYNGOLOGY | Facility: CLINIC | Age: 40
End: 2025-06-24
Payer: MEDICAID

## 2025-06-24 VITALS — SYSTOLIC BLOOD PRESSURE: 124 MMHG | DIASTOLIC BLOOD PRESSURE: 83 MMHG | WEIGHT: 172.81 LBS | BODY MASS INDEX: 27.9 KG/M2

## 2025-06-24 DIAGNOSIS — R22.1 NECK NODULE: ICD-10-CM

## 2025-06-24 PROCEDURE — 3074F SYST BP LT 130 MM HG: CPT | Mod: CPTII,,, | Performed by: STUDENT IN AN ORGANIZED HEALTH CARE EDUCATION/TRAINING PROGRAM

## 2025-06-24 PROCEDURE — 1159F MED LIST DOCD IN RCRD: CPT | Mod: CPTII,,, | Performed by: STUDENT IN AN ORGANIZED HEALTH CARE EDUCATION/TRAINING PROGRAM

## 2025-06-24 PROCEDURE — 99999 PR PBB SHADOW E&M-EST. PATIENT-LVL II: CPT | Mod: PBBFAC,,, | Performed by: STUDENT IN AN ORGANIZED HEALTH CARE EDUCATION/TRAINING PROGRAM

## 2025-06-24 PROCEDURE — 1160F RVW MEDS BY RX/DR IN RCRD: CPT | Mod: CPTII,,, | Performed by: STUDENT IN AN ORGANIZED HEALTH CARE EDUCATION/TRAINING PROGRAM

## 2025-06-24 PROCEDURE — 3008F BODY MASS INDEX DOCD: CPT | Mod: CPTII,,, | Performed by: STUDENT IN AN ORGANIZED HEALTH CARE EDUCATION/TRAINING PROGRAM

## 2025-06-24 PROCEDURE — 99203 OFFICE O/P NEW LOW 30 MIN: CPT | Mod: S$PBB,,, | Performed by: STUDENT IN AN ORGANIZED HEALTH CARE EDUCATION/TRAINING PROGRAM

## 2025-06-24 PROCEDURE — 3079F DIAST BP 80-89 MM HG: CPT | Mod: CPTII,,, | Performed by: STUDENT IN AN ORGANIZED HEALTH CARE EDUCATION/TRAINING PROGRAM

## 2025-06-24 PROCEDURE — 99212 OFFICE O/P EST SF 10 MIN: CPT | Mod: PBBFAC | Performed by: STUDENT IN AN ORGANIZED HEALTH CARE EDUCATION/TRAINING PROGRAM

## 2025-06-24 NOTE — PROGRESS NOTES
Subjective:      History of Present Illness    CHIEF COMPLAINT:  Patient presents today for evaluation of neck mass.    HISTORY OF PRESENT ILLNESS:  She discovered neck masses approximately 3 weeks ago located on one side of her neck. Initially she experienced two masses causing significant discomfort that was severe enough to disrupt sleep. The masses were tender to touch but have since decreased in size and are less prominent, no longer causing significant distress. This is the first time she has experienced such a condition, which appeared spontaneously without preceding illness. Her primary care physician ordered an ultrasound which appeared normal. She was prescribed antibiotics and pain medication.    PAST MEDICAL HISTORY:  She denies any other medical problems currently being treated by a healthcare provider.    SOCIAL HISTORY:  She denies alcohol and tobacco use.         The following portions of the patient's history were reviewed and updated as appropriate: allergies, current medications, past family history, past medical history, past social history, past surgical history, and problem list.    Review of Systems  A comprehensive review of systems was negative.       Objective:      /83   Wt 78.4 kg (172 lb 13.5 oz)   BMI 27.90 kg/m²     General:   healthy, alert, appears stated age   Head and Face:   facial movement was normal and symmetrical, nontender   External Ears:   normal pinnae shape and position   Ext. Aud. Canal:  Right:patent    Left: patent    Tympanic Mem:  Right: normal landmarks and mobility   Left: normal landmarks and mobility   Nose:  Nares normal. Septum midline. Mucosa normal. No drainage or sinus tenderness.   Oropharynx:   lips, dentition and gingiva within normal for age   Tonsils:   normal size, normal appearance   Post. Pharynx:   normal mucosa   Neck:   no asymmetry, masses, or scars   Thyroid:   Normal      US neck    FINDINGS:  There is a 10 x 8 x 4 mm normal-appearing  lymph node in the left posterior neck in the area the patient's complaint.  There is an adjacent 11 x 3 x 7 mm lymph node.        Impression:  2 benign-appearing lymph nodes in the posterior aspect of the neck.    Assessment & Plan    R59.0 Localized enlarged lymph nodes  Z86.19 Personal history of other infectious and parasitic diseases    LOCALIZED ENLARGED LYMPH NODES:  - Assessed neck mass reported by patient, discovered 3 weeks ago.  - Reviewed US results, which appeared normal.  - Determined mass to be a reactive lymph node, likely due to a recent infection.  - Concluded no further intervention necessary given resolution of symptoms and normal exam.  - Explained the role of lymph nodes in fighting infections and that enlarged, tender lymph nodes are often a normal immune response.  - Reviewed that persistent, non-resolving lymph node enlargement would be more concerning.       Andrei Carrillo MD    This note was generated with the assistance of ambient listening technology. Verbal consent was obtained by the patient and accompanying visitor(s) for the recording of patient appointment to facilitate this note. I attest to having reviewed and edited the generated note for accuracy, though some syntax or spelling errors may persist. Please contact the author of this note for any clarification.

## 2025-07-15 ENCOUNTER — TELEPHONE (OUTPATIENT)
Dept: FAMILY MEDICINE | Facility: CLINIC | Age: 40
End: 2025-07-15
Payer: MEDICAID

## 2025-07-15 DIAGNOSIS — F32.A DEPRESSION, UNSPECIFIED DEPRESSION TYPE: ICD-10-CM

## 2025-07-15 RX ORDER — SERTRALINE HYDROCHLORIDE 25 MG/1
25 TABLET, FILM COATED ORAL DAILY
Qty: 90 TABLET | Refills: 3 | Status: SHIPPED | OUTPATIENT
Start: 2025-07-15

## 2025-07-15 NOTE — TELEPHONE ENCOUNTER
Called patient states she had to cancel her appointment because she had to work, do patient need a new appointment or is it ok for her to keep current appointment in December?

## 2025-07-15 NOTE — TELEPHONE ENCOUNTER
No care due was identified.  [facility] Embedded Care Due Messages. Reference number: [id number].   7/15/2025 8:18:30 AM CDT

## 2025-07-15 NOTE — TELEPHONE ENCOUNTER
Copied from CRM #[id number]. Topic: Appointments - Appointment Cancellation  >> Jul 15, 2025  1:20 PM Zaynab wrote:  Type:  Sooner Apoointment Request    Caller is requesting a sooner appointment.  Caller declined first available appointment listed below.  Caller will not accept being placed on the waitlist and is requesting a message be sent to doctor.  Name of Caller:Marco Antonio Cisse  When is the first available appointment?  Symptoms:4wk fu  Would the patient rather a call back or a response via [facility]? either  Best Call Back Number:[phone]  Additional Information: the patient could not leave work in time and she was calling to get it rescheduled.